# Patient Record
Sex: FEMALE | Race: WHITE
[De-identification: names, ages, dates, MRNs, and addresses within clinical notes are randomized per-mention and may not be internally consistent; named-entity substitution may affect disease eponyms.]

---

## 2018-12-29 ENCOUNTER — HOSPITAL ENCOUNTER (EMERGENCY)
Dept: HOSPITAL 10 - E/R | Age: 58
Discharge: HOME | End: 2018-12-29
Payer: COMMERCIAL

## 2018-12-29 ENCOUNTER — HOSPITAL ENCOUNTER (EMERGENCY)
Dept: HOSPITAL 91 - E/R | Age: 58
Discharge: HOME | End: 2018-12-29
Payer: COMMERCIAL

## 2018-12-29 VITALS — HEIGHT: 55 IN | BODY MASS INDEX: 32.35 KG/M2 | WEIGHT: 139.77 LBS

## 2018-12-29 VITALS — HEART RATE: 66 BPM | DIASTOLIC BLOOD PRESSURE: 90 MMHG | RESPIRATION RATE: 18 BRPM | SYSTOLIC BLOOD PRESSURE: 165 MMHG

## 2018-12-29 DIAGNOSIS — R19.03: Primary | ICD-10-CM

## 2018-12-29 DIAGNOSIS — I10: ICD-10-CM

## 2018-12-29 DIAGNOSIS — Z79.82: ICD-10-CM

## 2018-12-29 LAB
ADD MAN DIFF?: NO
ADD UMIC: NO
ALANINE AMINOTRANSFERASE: 28 IU/L (ref 13–69)
ALBUMIN/GLOBULIN RATIO: 1.23
ALBUMIN: 4.2 G/DL (ref 3.3–4.9)
ALKALINE PHOSPHATASE: 141 IU/L (ref 42–121)
ANION GAP: 12 (ref 5–13)
ASPARTATE AMINO TRANSFERASE: 25 IU/L (ref 15–46)
BASOPHIL #: 0.1 10^3/UL (ref 0–0.1)
BASOPHILS %: 1 % (ref 0–2)
BILIRUBIN,DIRECT: 0 MG/DL (ref 0–0.2)
BILIRUBIN,TOTAL: 0.2 MG/DL (ref 0.2–1.3)
BLOOD UREA NITROGEN: 16 MG/DL (ref 7–20)
CALCIUM: 9 MG/DL (ref 8.4–10.2)
CARBON DIOXIDE: 32 MMOL/L (ref 21–31)
CHLORIDE: 103 MMOL/L (ref 97–110)
CREATININE: 0.81 MG/DL (ref 0.44–1)
EOSINOPHILS #: 0.2 10^3/UL (ref 0–0.5)
EOSINOPHILS %: 1.9 % (ref 0–7)
GLOBULIN: 3.4 G/DL (ref 1.3–3.2)
GLUCOSE: 101 MG/DL (ref 70–220)
HEMATOCRIT: 44.9 % (ref 37–47)
HEMOGLOBIN: 13.8 G/DL (ref 12–16)
IMMATURE GRANS #M: 0.04 10^3/UL (ref 0–0.03)
IMMATURE GRANS % (M): 0.5 % (ref 0–0.43)
LYMPHOCYTES #: 1.2 10^3/UL (ref 0.8–2.9)
LYMPHOCYTES %: 14.6 % (ref 15–51)
MEAN CORPUSCULAR HEMOGLOBIN: 25.7 PG (ref 29–33)
MEAN CORPUSCULAR HGB CONC: 30.7 G/DL (ref 32–37)
MEAN CORPUSCULAR VOLUME: 83.5 FL (ref 82–101)
MEAN PLATELET VOLUME: 12.2 FL (ref 7.4–10.4)
MONOCYTE #: 0.8 10^3/UL (ref 0.3–0.9)
MONOCYTES %: 9.6 % (ref 0–11)
NEUTROPHIL #: 6.1 10^3/UL (ref 1.6–7.5)
NEUTROPHILS %: 72.4 % (ref 39–77)
NUCLEATED RED BLOOD CELLS #: 0 10^3/UL (ref 0–0)
NUCLEATED RED BLOOD CELLS%: 0 /100WBC (ref 0–0)
PLATELET COUNT: 232 10^3/UL (ref 140–415)
POTASSIUM: 3.9 MMOL/L (ref 3.5–5.1)
RED BLOOD COUNT: 5.38 10^6/UL (ref 4.2–5.4)
RED CELL DISTRIBUTION WIDTH: 14.1 % (ref 11.5–14.5)
SODIUM: 147 MMOL/L (ref 135–144)
TOTAL PROTEIN: 7.6 G/DL (ref 6.1–8.1)
UR ASCORBIC ACID: NEGATIVE MG/DL
UR BILIRUBIN (DIP): NEGATIVE MG/DL
UR BLOOD (DIP): NEGATIVE MG/DL
UR CLARITY: CLEAR
UR COLOR: (no result)
UR GLUCOSE (DIP): (no result) MG/DL
UR KETONES (DIP): NEGATIVE MG/DL
UR LEUKOCYTE ESTERASE (DIP): NEGATIVE LEU/UL
UR NITRITE (DIP): NEGATIVE MG/DL
UR PH (DIP): 6 (ref 5–9)
UR SPECIFIC GRAVITY (DIP): 1.01 (ref 1–1.03)
UR TOTAL PROTEIN (DIP): NEGATIVE MG/DL
UR UROBILINOGEN (DIP): NEGATIVE MG/DL
WHITE BLOOD COUNT: 8.4 10^3/UL (ref 4.8–10.8)

## 2018-12-29 PROCEDURE — 36415 COLL VENOUS BLD VENIPUNCTURE: CPT

## 2018-12-29 PROCEDURE — 74177 CT ABD & PELVIS W/CONTRAST: CPT

## 2018-12-29 PROCEDURE — 80053 COMPREHEN METABOLIC PANEL: CPT

## 2018-12-29 PROCEDURE — 96374 THER/PROPH/DIAG INJ IV PUSH: CPT

## 2018-12-29 PROCEDURE — 81003 URINALYSIS AUTO W/O SCOPE: CPT

## 2018-12-29 PROCEDURE — 96375 TX/PRO/DX INJ NEW DRUG ADDON: CPT

## 2018-12-29 PROCEDURE — 99285 EMERGENCY DEPT VISIT HI MDM: CPT

## 2018-12-29 PROCEDURE — 85025 COMPLETE CBC W/AUTO DIFF WBC: CPT

## 2018-12-29 RX ADMIN — THIAMINE HYDROCHLORIDE 1 MLS/HR: 100 INJECTION, SOLUTION INTRAMUSCULAR; INTRAVENOUS at 18:39

## 2018-12-29 RX ADMIN — HYDROMORPHONE HYDROCHLORIDE 1 MG: 1 INJECTION, SOLUTION INTRAMUSCULAR; INTRAVENOUS; SUBCUTANEOUS at 18:39

## 2018-12-29 RX ADMIN — IOHEXOL 1 ML: 300 INJECTION, SOLUTION INTRAVENOUS at 19:48

## 2018-12-29 RX ADMIN — VASOPRESSIN 1 ML/2.5 SEC: 20 INJECTION, SOLUTION INTRAMUSCULAR; SUBCUTANEOUS at 19:48

## 2018-12-29 RX ADMIN — ONDANSETRON HYDROCHLORIDE 1 MG: 2 INJECTION, SOLUTION INTRAMUSCULAR; INTRAVENOUS at 18:39

## 2018-12-29 NOTE — ERD
ER Documentation


Chief Complaint


Chief Complaint





LOWER RIGHT SIDED ABD X 4 DAYS HX OF OVARIAN





HPI


This is a 58-year-old female complains of 1 day of right lower quadrant pain 


described as constant without radiation.  The pain is sharp in nature.  No 


nausea vomiting diarrhea.  No fever.  No hematuria or dysuria.  Pain is somewhat


worse with walking.  The patient was seen in urgent care clinic just prior to 


arrival and was sent here for evaluation to rule out appendicitis





ROS


All systems reviewed and are negative except as per history of present illness.





Medications


Home Meds


Active Scripts


Hydrocodone/Acetaminophen (Norco 5-325 Tablet) 1 Each Tablet, 1 TAB PO Q6H PRN 


for PAIN, #20 TAB


   Prov:LEKKOS,APOSTOLOS A. DO         12/29/18


Dicyclomine HCl (Dicyclomine HCl) 10 Mg Capsule, 20 MG PO TID PRN for ABDOMINAL 


CRAMPING, #20 CAP


   Prov:LEKKOS,APOSTOLOS A. DO         12/29/18


Reported Medications


Empagliflozin (Jardiance) 10 Mg Tablet, 10 MG PO DAILY, TAB


   12/29/18


Omeprazole* (Omeprazole*) 40 Mg Capsule.dr, 40 MG PO DAILY, #30 CAP


   12/29/18


Carvedilol* (Carvedilol*) 3.125 Mg Tablet, 3.125 MG PO BID, #60 TAB


   12/29/18


Aspirin* (Aspirin* EC) 81 Mg Tablet.dr, 81 MG PO DAILY, TAB


   12/29/18


Benazepril Hcl* (Benazepril Hcl*) 40 Mg Tablet, 40 MG PO DAILY, #30 TAB


   12/29/18


Discontinued Reported Medications


Metoprolol Tartrate* (Lopressor*) 25 Mg Tab, 12.5 MG PO DAILY, #60 TAB


   8/27/16


Omeprazole* (Omeprazole*) 40 Mg Capsule.dr, 40 MG PO DAILY, #30 CAP


   8/27/16


Benazepril Hcl* (Benazepril Hcl*) 20 Mg Tablet, 20 MG PO DAILY, #30 TAB


   8/27/16





Allergies


Allergies:  


Coded Allergies:  


     No Known Allergy (Unverified , 12/29/18)





PMhx/Soc


History of Surgery:  No


Anesthesia Reaction:  No


Hx Neurological Disorder:  No


Hx Respiratory Disorders:  No


Hx Cardiac Disorders:  Yes (HTN )


Hx Psychiatric Problems:  No


Hx Miscellaneous Medical Probl:  No


Hx Alcohol Use:  No


Hx Substance Use:  No


Hx Tobacco Use:  No


Smoking Status:  Never smoker





FmHx


Family History:  No coronary disease





Physical Exam


Vitals





Vital Signs


  Date      Temp  Pulse  Resp  B/P (MAP)   Pulse Ox  O2          O2 Flow    FiO2


Time                                                 Delivery    Rate


  12/29/18  98.1     65    18      199/85        99


     17:18                          (123)





Physical Exam


 Const:      Well-developed, well-nourished


 Head:        Atraumatic, normocephalic


 Eyes:       Normal Conjunctiva, PERRLA, EOMI, normal sclera, no nystagmus


 ENT:         Normal External Ears, Nose and Mouth, moist mucus membranes.


 Neck:        Full range of motion.  No meningismus, no lymphadenopathy.


 Resp:         Clear to auscultation bilaterally, no wheezing, rhonchi, rales


 Cardio:       Regular rate and rhythm, no murmurs, S1 S2 present


 Abd:         Soft, mild to moderate right lower quadrant tenderness, non 


distended. Normal bowel sounds, no guarding or rebound, no pulsitile abdominal 


masses or bruits


 Skin:         No petechiae or rashes, no ecchymosis , no maculopapular rash


 Back:        No midline or flank tenderness


 Ext:          No cyanosis, or edema, FROM x 4, normal inspection, 


neurovascularly intact x 4


 Neur:        Awake and alert, STR 5/5 x 4, sensation intact x 4, no focal 


findings, cerebellum intact


 Psych:        Normal Mood and Affect


Result Diagram:  


12/29/18 1830 12/29/18 1830





Results 24 hrs





Laboratory Tests


              Test
                                 12/29/18
18:30


              White Blood Count                       8.4 10^3/ul


              Red Blood Count                        5.38 10^6/ul


              Hemoglobin                                13.8 g/dl


              Hematocrit                                   44.9 %


              Mean Corpuscular Volume                     83.5 fl


              Mean Corpuscular Hemoglobin                 25.7 pg


              Mean Corpuscular Hemoglobin
Concent      30.7 g/dl 



              Red Cell Distribution Width                  14.1 %


              Platelet Count                          232 10^3/UL


              Mean Platelet Volume                        12.2 fl


              Immature Granulocytes %                     0.500 %


              Neutrophils %                                72.4 %


              Lymphocytes %                                14.6 %


              Monocytes %                                   9.6 %


              Eosinophils %                                 1.9 %


              Basophils %                                   1.0 %


              Nucleated Red Blood Cells %             0.0 /100WBC


              Immature Granulocytes #               0.040 10^3/ul


              Neutrophils #                           6.1 10^3/ul


              Lymphocytes #                           1.2 10^3/ul


              Monocytes #                             0.8 10^3/ul


              Eosinophils #                           0.2 10^3/ul


              Basophils #                             0.1 10^3/ul


              Nucleated Red Blood Cells #             0.0 10^3/ul


              Urine Color                          STRAW


              Urine Clarity                        CLEAR


              Urine pH                                        6.0


              Urine Specific Gravity                        1.006


              Urine Ketones                        NEGATIVE mg/dL


              Urine Nitrite                        NEGATIVE mg/dL


              Urine Bilirubin                      NEGATIVE mg/dL


              Urine Urobilinogen                   NEGATIVE mg/dL


              Urine Leukocyte Esterase
            NEGATIVE
Cherie/ul


              Urine Hemoglobin                     NEGATIVE mg/dL


              Urine Glucose                              3+ mg/dL


              Urine Total Protein                  NEGATIVE mg/dl


              Sodium Level                             147 mmol/L


              Potassium Level                          3.9 mmol/L


              Chloride Level                           103 mmol/L


              Carbon Dioxide Level                      32 mmol/L


              Anion Gap                                        12


              Blood Urea Nitrogen                        16 mg/dl


              Creatinine                               0.81 mg/dl


              Est Glomerular Filtrat Rate
mL/min   > 60 mL/min 



              Glucose Level                             101 mg/dl


              Calcium Level                             9.0 mg/dl


              Total Bilirubin                           0.2 mg/dl


              Direct Bilirubin                         0.00 mg/dl


              Indirect Bilirubin                        0.2 mg/dl


              Aspartate Amino Transf
(AST/SGOT)          25 IU/L 



              Alanine Aminotransferase
(ALT/SGPT)        28 IU/L 



              Alkaline Phosphatase                       141 IU/L


              Total Protein                              7.6 g/dl


              Albumin                                    4.2 g/dl


              Globulin                                  3.40 g/dl


              Albumin/Globulin Ratio                         1.23





Current Medications


 Medications
   Dose
          Sig/Gracia
       Start Time
   Status  Last


 (Trade)       Ordered        Route
 PRN     Stop Time              Admin
Dose


                              Reason                                Admin


 Sodium         1,000 ml @ 
   Q1H STAT
      12/29/18      DC          12/29/18


Chloride       1,000 mls/hr   IV
            18:25
                       18:39



                                             12/29/18


                                             19:24


                1 mg           ONCE  STAT
    12/29/18      DC          12/29/18


Hydromorphone                 IV
            18:25
                       18:39



HCl
                                         12/29/18


(Dilaudid)                                   18:26


 Ondansetron    4 mg           ONCE  STAT
    12/29/18      DC          12/29/18


HCl
  (Zofran                 IV
            18:25
                       18:39



Inj)                                         12/29/18


                                             18:26


 Iohexol
       150 ml         STK-MED        12/29/18      DC          12/29/18


(Omnipaque                    ONCE
 .ROUTE
  19:28
                       19:48



300mg/
 ml)                                  12/29/18


                                             19:29


 Sodium         100 ml @ ud    STK-MED        12/29/18      DC          12/29/18


Chloride                      ONCE
 .ROUTE
  19:28
                       19:48



                                             12/29/18


                                             19:29








Procedures/MDM


                                        


PROCEDURE:   CT Abdomen and Pelvis with contrast. 


 


CLINICAL INDICATION:   Right lower quadrant pain


 


TECHNIQUE:   CT scan of the abdomen and pelvis with contrast was performed on a 


multi-detector high-resolution CT scanner.  The patient was scanned following th


e uncomplicated intravenous administration of 100 cc of Omnipaque 300 IV 


contrast.  Coronal and sagittal reformatted images were obtained from the axial 


source images. DICOM images are available. CTDI equals 12.25  mGy, and DLP 


equals 670.88 mGy-cm.


 


One or more of the following dose reduction techniques were used:


- Automated exposure control.


- Adjustment of the mA and/or kV according to patient size.


- Use of iterative reconstruction technique.


 


COMPARISON:   None. 


 


FINDINGS:


 


Images of the abdominal organs demonstrate the liver is unremarkable in 


appearance. No gallstones are seen within the gallbladder. There is no intra or 


extrahepatic ductal dilatation.


 


The pancreas and spleen and left adrenal gland are normal. There is a diffusely 


enhancing right adrenal mass which measures 2.3 x 2.2 cm.


 


Slightly delayed excretion of the right kidney is seen with moderate right-sided


hydronephrosis and hydroureter. There are least to right-sided renal stones, 


larger measuring 3 mm. The left kidney is normal in appearance. 


 


Images of the bowel loops demonstrate there are unremarkable without evidence of


wall thickening or fatty stranding.


 


The vessels are normal in appearance. Clips are seen in the region of the aortic


bifurcations. No lymphadenopathy is visualized.


 


Images the pelvis demonstrate mild diverticulosis of the sigmoid colon.  The 


appendix is visualized and is unremarkable. The bladder is unremarkable as 


compressed secondary to a cystic lesion with a mural nodule in the pelvis which 


measures 9.8 x 8.2 cm. The uterus is not visualized. No lymphadenopathy is seen.


 


The osseous structures are unremarkable. The lung bases are clear.


 


IMPRESSION:


 


CT of the abdomen pelvis demonstrates a cystic mass within the pelvis with a 


solid right-sided neural nodule. The patient is status post hysterectomy. Would 


recommend correlation for oophorectomy as this could represent an ovarian 


neoplasm. There is likely compression on the distal ureter resulting in moderate


right-sided hydronephrosis and hydroureter.


 


To right-sided renal stones measuring 3 mm.


 


There is a heterogeneously enhancing right adrenal nodule which measures 2.3 cm 


and a metastatic lesion is not excluded would recommend further evaluation.


 


Mild diverticulosis of the sigmoid colon.


 


RPTAT:HAGL


_____________________________________________ 


Physician Darlene           Date    Time 


Electronically viewed and signed by Anu Pittman Physician on 12/29/2018 20:01 


 


D:  12/29/2018 20:01  T:  12/29/2018 20:01


RL/





CC: ENA JORDAN DO





[phone]




















The patient has a history of ovarian cancer with complete hysterectomy.  Some 


type of cystic mass which may be some type of cancer.  There is also a possible 


mass on the adrenal gland.  The ureter is getting slightly pinch causing some 


mild hydro-on the right.  I discussed this with the patient and her family given


a copy of the CAT scan report and that they need to follow-up with her doctor 


Monday.  The needed MRI of the pelvis to evaluate with this is a could be a 


return of some cancer.





She is making urine still has no flank pain I do not think she needs to come in 


for the hospital for a stent yet.  I do given strong warning signs to return.








Patient feels much better at this time, and vital signs are normal, symptoms 


have improved. I did give strict instructions to return to the ED if symptoms 


continue or worsen, patient will otherwise follow-up with primary care 


physician. Patient understood instructions and agreed to plan.





Disclaimer: Inadvertent spelling and grammatical errors are likely due to 


EHR/dictation software use and do not reflect on the overall quality of patient 


care. Also, please note that the electronic time recorded on this note does not 


necessarily reflect the actual time of the patient encounter.





Departure


Diagnosis:  


   Primary Impression:  


   Pelvic mass in female


   Additional Impression:  


   Abdominal pain


   Abdominal location:  right lower quadrant  Qualified Codes:  R10.31 - Right 


   lower quadrant pain


Condition:  Stable











ENA JORDAN DO         Dec 29, 2018 18:46

## 2019-02-21 ENCOUNTER — HOSPITAL ENCOUNTER (EMERGENCY)
Dept: HOSPITAL 91 - E/R | Age: 59
Discharge: HOME | End: 2019-02-21
Payer: COMMERCIAL

## 2019-02-21 ENCOUNTER — HOSPITAL ENCOUNTER (EMERGENCY)
Dept: HOSPITAL 10 - E/R | Age: 59
Discharge: HOME | End: 2019-02-21
Payer: COMMERCIAL

## 2019-02-21 VITALS — HEART RATE: 63 BPM | SYSTOLIC BLOOD PRESSURE: 127 MMHG | RESPIRATION RATE: 16 BRPM | DIASTOLIC BLOOD PRESSURE: 63 MMHG

## 2019-02-21 VITALS
WEIGHT: 141.1 LBS | BODY MASS INDEX: 25.96 KG/M2 | BODY MASS INDEX: 25.96 KG/M2 | HEIGHT: 62 IN | WEIGHT: 141.1 LBS | HEIGHT: 62 IN

## 2019-02-21 DIAGNOSIS — Z85.43: ICD-10-CM

## 2019-02-21 DIAGNOSIS — I10: ICD-10-CM

## 2019-02-21 DIAGNOSIS — Z85.42: ICD-10-CM

## 2019-02-21 DIAGNOSIS — Z79.82: ICD-10-CM

## 2019-02-21 DIAGNOSIS — R33.9: ICD-10-CM

## 2019-02-21 DIAGNOSIS — E11.9: ICD-10-CM

## 2019-02-21 DIAGNOSIS — R19.00: Primary | ICD-10-CM

## 2019-02-21 LAB
ADD MAN DIFF?: NO
ADD UMIC: YES
ALANINE AMINOTRANSFERASE: 50 IU/L (ref 13–69)
ALBUMIN/GLOBULIN RATIO: 1.05
ALBUMIN: 3.9 G/DL (ref 3.3–4.9)
ALKALINE PHOSPHATASE: 135 IU/L (ref 42–121)
ANION GAP: 9 (ref 5–13)
ASPARTATE AMINO TRANSFERASE: 34 IU/L (ref 15–46)
BASOPHIL #: 0.1 10^3/UL (ref 0–0.1)
BASOPHILS %: 0.6 % (ref 0–2)
BILIRUBIN,DIRECT: 0 MG/DL (ref 0–0.2)
BILIRUBIN,TOTAL: 0.3 MG/DL (ref 0.2–1.3)
BLOOD UREA NITROGEN: 24 MG/DL (ref 7–20)
CALCIUM: 8.8 MG/DL (ref 8.4–10.2)
CARBON DIOXIDE: 26 MMOL/L (ref 21–31)
CHLORIDE: 107 MMOL/L (ref 97–110)
CREATININE: 1.19 MG/DL (ref 0.44–1)
EOSINOPHILS #: 0.1 10^3/UL (ref 0–0.5)
EOSINOPHILS %: 1.1 % (ref 0–7)
GLOBULIN: 3.7 G/DL (ref 1.3–3.2)
GLUCOSE: 94 MG/DL (ref 70–220)
HEMATOCRIT: 39.6 % (ref 37–47)
HEMOGLOBIN: 12.4 G/DL (ref 12–16)
IMMATURE GRANS #M: 0.05 10^3/UL (ref 0–0.03)
IMMATURE GRANS % (M): 0.6 % (ref 0–0.43)
LIPASE: 121 U/L (ref 23–300)
LYMPHOCYTES #: 1 10^3/UL (ref 0.8–2.9)
LYMPHOCYTES %: 11 % (ref 15–51)
MEAN CORPUSCULAR HEMOGLOBIN: 25.6 PG (ref 29–33)
MEAN CORPUSCULAR HGB CONC: 31.3 G/DL (ref 32–37)
MEAN CORPUSCULAR VOLUME: 81.6 FL (ref 82–101)
MEAN PLATELET VOLUME: 11.8 FL (ref 7.4–10.4)
MONOCYTE #: 0.9 10^3/UL (ref 0.3–0.9)
MONOCYTES %: 10.6 % (ref 0–11)
NEUTROPHIL #: 6.7 10^3/UL (ref 1.6–7.5)
NEUTROPHILS %: 76.1 % (ref 39–77)
NUCLEATED RED BLOOD CELLS #: 0 10^3/UL (ref 0–0)
NUCLEATED RED BLOOD CELLS%: 0 /100WBC (ref 0–0)
PLATELET COUNT: 231 10^3/UL (ref 140–415)
POTASSIUM: 3.5 MMOL/L (ref 3.5–5.1)
RED BLOOD COUNT: 4.85 10^6/UL (ref 4.2–5.4)
RED CELL DISTRIBUTION WIDTH: 15.1 % (ref 11.5–14.5)
SODIUM: 142 MMOL/L (ref 135–144)
TOTAL PROTEIN: 7.6 G/DL (ref 6.1–8.1)
UR ASCORBIC ACID: NEGATIVE MG/DL
UR BACTERIA: (no result) /HPF
UR BILIRUBIN (DIP): NEGATIVE MG/DL
UR BLOOD (DIP): (no result) MG/DL
UR CLARITY: CLEAR
UR COLOR: (no result)
UR GLUCOSE (DIP): (no result) MG/DL
UR KETONES (DIP): NEGATIVE MG/DL
UR LEUKOCYTE ESTERASE (DIP): NEGATIVE LEU/UL
UR NITRITE (DIP): NEGATIVE MG/DL
UR PH (DIP): 6 (ref 5–9)
UR RBC: 6 /HPF (ref 0–5)
UR SPECIFIC GRAVITY (DIP): 1.01 (ref 1–1.03)
UR SQUAMOUS EPITHELIAL CELL: (no result) /HPF
UR TOTAL PROTEIN (DIP): NEGATIVE MG/DL
UR UROBILINOGEN (DIP): NEGATIVE MG/DL
UR WBC: 2 /HPF (ref 0–5)
WHITE BLOOD COUNT: 8.8 10^3/UL (ref 4.8–10.8)

## 2019-02-21 PROCEDURE — 80053 COMPREHEN METABOLIC PANEL: CPT

## 2019-02-21 PROCEDURE — 96376 TX/PRO/DX INJ SAME DRUG ADON: CPT

## 2019-02-21 PROCEDURE — 81001 URINALYSIS AUTO W/SCOPE: CPT

## 2019-02-21 PROCEDURE — 85025 COMPLETE CBC W/AUTO DIFF WBC: CPT

## 2019-02-21 PROCEDURE — 99285 EMERGENCY DEPT VISIT HI MDM: CPT

## 2019-02-21 PROCEDURE — 96375 TX/PRO/DX INJ NEW DRUG ADDON: CPT

## 2019-02-21 PROCEDURE — 96374 THER/PROPH/DIAG INJ IV PUSH: CPT

## 2019-02-21 PROCEDURE — 74177 CT ABD & PELVIS W/CONTRAST: CPT

## 2019-02-21 PROCEDURE — 36415 COLL VENOUS BLD VENIPUNCTURE: CPT

## 2019-02-21 PROCEDURE — 83690 ASSAY OF LIPASE: CPT

## 2019-02-21 PROCEDURE — 51702 INSERT TEMP BLADDER CATH: CPT

## 2019-02-21 RX ADMIN — ONDANSETRON HYDROCHLORIDE 1 MG: 2 INJECTION, SOLUTION INTRAMUSCULAR; INTRAVENOUS at 12:41

## 2019-02-21 RX ADMIN — SODIUM CHLORIDE 1 ML: 9 INJECTION, SOLUTION INTRAMUSCULAR; INTRAVENOUS; SUBCUTANEOUS at 13:20

## 2019-02-21 RX ADMIN — IODIXANOL 1 ML: 320 INJECTION, SOLUTION INTRAVASCULAR at 13:21

## 2019-02-21 RX ADMIN — FAMOTIDINE 1 MG: 10 INJECTION, SOLUTION INTRAVENOUS at 12:41

## 2019-02-21 RX ADMIN — THIAMINE HYDROCHLORIDE 1 MLS/HR: 100 INJECTION, SOLUTION INTRAMUSCULAR; INTRAVENOUS at 12:41

## 2019-02-21 RX ADMIN — VASOPRESSIN 1 ML/10 SEC: 20 INJECTION, SOLUTION INTRAMUSCULAR; SUBCUTANEOUS at 13:21

## 2019-02-21 NOTE — ERD
ER Documentation


Chief Complaint


Chief Complaint





C/O ABD. PAIN FOR 2 DAYS, PAIN WITH URINATION.





HPI


This is a 58-year-old female with a history of ovarian cancer and cervical 


cancer with status post hysterectomy done in 2011 who presents to the emergency 


room for evaluation of pelvic pain.  The patient states that she has had pain 


for the past 2 days and according to the daughter this patient was diagnosed 


with "a pelvic mass" in late 2018 and was discharged.  She has not had any 


follow-up with surgeon and came to the ER today for worsening symptoms of 


abdominal pain and difficulty with urination.  The patient denies any fevers 


chills nausea or vomiting associated with this and came to the ER for evaluation





ROS


All systems reviewed and are negative except as per history of present illness.





Medications


Home Meds


Reported Medications


Empagliflozin (Jardiance) 10 Mg Tablet, 10 MG PO DAILY, TAB


   12/29/18


Omeprazole* (Omeprazole*) 40 Mg Capsule.dr, 40 MG PO DAILY, #30 CAP


   12/29/18


Carvedilol* (Carvedilol*) 3.125 Mg Tablet, 3.125 MG PO BID, #60 TAB


   12/29/18


Benazepril Hcl* (Benazepril Hcl*) 40 Mg Tablet, 40 MG PO DAILY, #30 TAB


   12/29/18


Discontinued Reported Medications


Aspirin* (Aspirin* EC) 81 Mg Tablet.dr, 81 MG PO DAILY, TAB


   12/29/18


Discontinued Scripts


Dicyclomine HCl (Dicyclomine HCl) 10 Mg Capsule, 10 MG PO TID PRN for ABDOMINAL 


CRAMPING, #20 CAP


   Prov:BRY MELENDREZ MD         12/29/18


Hydrocodone/Acetaminophen (Norco 5-325 Tablet) 1 Each Tablet, 1 TAB PO Q6H PRN 


for PAIN, #20 TAB


   Prov:ENA JORDAN A. DO         12/29/18


Dicyclomine HCl (Dicyclomine HCl) 10 Mg Capsule, 20 MG PO TID PRN for ABDOMINAL 


CRAMPING, #20 CAP


   Prov:RAZIA JORDANSTKIRTS A. DO         12/29/18





Allergies


Allergies:  


Coded Allergies:  


     No Known Allergy (Unverified , 12/29/18)





PMhx/Soc


History of Surgery:  No


Anesthesia Reaction:  No


Hx Neurological Disorder:  No


Hx Respiratory Disorders:  No


Hx Cardiac Disorders:  Yes (HTN )


Hx Psychiatric Problems:  No


Hx Miscellaneous Medical Probl:  Yes (DM)


Hx Alcohol Use:  No


Hx Substance Use:  No


Hx Tobacco Use:  No


Smoking Status:  Never smoker





Physical Exam


Vitals





Vital Signs


  Date      Temp  Pulse  Resp  B/P (MAP)   Pulse Ox  O2          O2 Flow    FiO2


Time                                                 Delivery    Rate


   2/21/19           68    20      159/62        98  Room Air


     13:43                           (94)


   2/21/19  97.8     83    18      185/95       100


     10:11                          (125)





Physical Exam


INITIAL VITAL SIGNS: Reviewed by me


GENERAL:  The patient is well developed and appropriate for usual state of 


health in no apparent distress


HEENT: Pupils equal, round, and reactive to light.  EOMI. There is no scleral 


icterus.


NECK:  C-spine is soft and supple, there is no meningismus.  There is no 


cervical lymphadenopathy.


LUNGS:  Clear to auscultation bilaterally. There are no rales, wheezes or 


rhonchi.


HEART:  Regular rate and rhythm, no murmurs, clicks, rubs or gallops.


ABDOMEN: Prepubic tenderness to palpation, tenderness to palpation over the 


right adnexal region, otherwise soft, non-tender, non-distended.  There are 


bowel sounds in all four quadrants. No rebound or guarding.


EXTREMITIES:  There is no peripheral cyanosis or edema.  No focal swelling or 


erythema.


NEUROLOGICAL:  The patient moves all four extremities with 5/5 strength.  


Cranial nerves II - XII are intact. Normal gait. Alert and oriented


SKIN:  There is no apparent rash or petechiae.


HEME/LYMPHATIC:  There is no evidence of excessive bruising or lymphedema.


PSYCHIATRIC:  The patient does not appear anxious or depressed.


Result Diagram:  


2/21/19 1230                                                                    


           2/21/19 1230





Results 24 hrs





Laboratory Tests


              Test
                                  2/21/19
12:30


              White Blood Count                       8.8 10^3/ul


              Red Blood Count                        4.85 10^6/ul


              Hemoglobin                                12.4 g/dl


              Hematocrit                                   39.6 %


              Mean Corpuscular Volume                     81.6 fl


              Mean Corpuscular Hemoglobin                 25.6 pg


              Mean Corpuscular Hemoglobin
Concent      31.3 g/dl 



              Red Cell Distribution Width                  15.1 %


              Platelet Count                          231 10^3/UL


              Mean Platelet Volume                        11.8 fl


              Immature Granulocytes %                     0.600 %


              Neutrophils %                                76.1 %


              Lymphocytes %                                11.0 %


              Monocytes %                                  10.6 %


              Eosinophils %                                 1.1 %


              Basophils %                                   0.6 %


              Nucleated Red Blood Cells %             0.0 /100WBC


              Immature Granulocytes #               0.050 10^3/ul


              Neutrophils #                           6.7 10^3/ul


              Lymphocytes #                           1.0 10^3/ul


              Monocytes #                             0.9 10^3/ul


              Eosinophils #                           0.1 10^3/ul


              Basophils #                             0.1 10^3/ul


              Nucleated Red Blood Cells #             0.0 10^3/ul


              Urine Color                          STRAW


              Urine Clarity                        CLEAR


              Urine pH                                        6.0


              Urine Specific Gravity                        1.008


              Urine Ketones                        NEGATIVE mg/dL


              Urine Nitrite                        NEGATIVE mg/dL


              Urine Bilirubin                      NEGATIVE mg/dL


              Urine Urobilinogen                   NEGATIVE mg/dL


              Urine Leukocyte Esterase
            NEGATIVE
Cherie/ul


              Urine Microscopic RBC                        6 /HPF


              Urine Microscopic WBC                        2 /HPF


              Urine Squamous Epithelial
Cells      FEW /HPF 



              Urine Bacteria                       FEW /HPF


              Urine Hemoglobin                           2+ mg/dL


              Urine Glucose                              3+ mg/dL


              Urine Total Protein                  NEGATIVE mg/dl


              Sodium Level                             142 mmol/L


              Potassium Level                          3.5 mmol/L


              Chloride Level                           107 mmol/L


              Carbon Dioxide Level                      26 mmol/L


              Anion Gap                                         9


              Blood Urea Nitrogen                        24 mg/dl


              Creatinine                               1.19 mg/dl


              Est Glomerular Filtrat Rate
mL/min       47 mL/min 



              Glucose Level                              94 mg/dl


              Calcium Level                             8.8 mg/dl


              Total Bilirubin                           0.3 mg/dl


              Direct Bilirubin                         0.00 mg/dl


              Indirect Bilirubin                        0.3 mg/dl


              Aspartate Amino Transf
(AST/SGOT)          34 IU/L 



              Alanine Aminotransferase
(ALT/SGPT)        50 IU/L 



              Alkaline Phosphatase                       135 IU/L


              Total Protein                              7.6 g/dl


              Albumin                                    3.9 g/dl


              Globulin                                  3.70 g/dl


              Albumin/Globulin Ratio                         1.05


              Lipase                                      121 U/L





Current Medications


 Medications
   Dose
          Sig/Gracia
       Start Time
   Status  Last


 (Trade)       Ordered        Route
 PRN     Stop Time              Admin
Dose


                              Reason                                Admin


 Sodium         1,000 ml @ 
   Q1H STAT
      2/21/19       DC           2/21/19


Chloride       1,000 mls/hr   IV
            11:58
                       12:41



                                             2/21/19 12:57


 Morphine       4 mg           ONCE  STAT
    2/21/19       DC           2/21/19


Sulfate
                      IV
            11:58
                       12:42



(morphine)                                   2/21/19 11:59


 Ondansetron    4 mg           ONCE  STAT
    2/21/19       DC           2/21/19


HCl
  (Zofran                 IV
            11:58
                       12:41



Inj)                                         2/21/19 11:59


 Famotidine
    20 mg          ONCE  STAT
    2/21/19       DC           2/21/19


(Pepcid Iv)                   IV
            11:58
                       12:41



                                             2/21/19 11:59


 IV Flush
      10 ml          STK-MED        2/21/19       DC           2/21/19


(NS 10 ml)                    ONCE
 .ROUTE
  13:14
                       13:20



                                             2/21/19 13:15


 Sodium         100 ml @ ud    STK-MED        2/21/19       DC           2/21/19


Chloride                      ONCE
 .ROUTE
  13:14
                       13:21



                                             2/21/19 13:15


 Iodixanol
     100 ml         STK-MED        2/21/19       DC           2/21/19


(Visipaque                    ONCE
 .ROUTE
  13:14
                       13:21



Locm)                                        2/21/19 13:15


 Morphine       4 mg           ONCE  STAT
    2/21/19       DC           2/21/19


Sulfate
                      IV
            13:29
                       13:43



(morphine)                                   2/21/19 13:31








Procedures/MDM


CT abdomen pelvis without: 1. STATUS POST HYSTERECTOMY AGAIN IDENTIFIED IS A 


LARGE CYSTIC MASS MEASURING 11.5 X 9.7 CM WITH RIGHT SIDED MURAL NODULE. SUSPECT


OVARIAN ORIGIN, WORRISOME FOR OVARIAN NEOPLASM. THIS HAS INCREASED IN SIZE SINCE


PRIOR EXAMINATION.


 


2.  THERE IS MASS EFFECT ON THE BILATERAL DISTAL URETERS, WITH ASSOCIATED 


BILATERAL HYDROURETERONEPHROSIS, WHICH HAS WORSENED SINCE PRIOR EXAMINATION. 


THERE IS ALSO MASS EFFECT ON THE POSTERIOR WALL THE BLADDER WHICH IS DISTENDED. 


RECOMMEND DECOMPRESSION.


 


3.  2.7 cm right adrenal gland nodule, cannot exclude metastatic disease.


 


4. No evidence of bowel obstruction. Stool filled loops of large bowel 


suggestive of constipation.


 





This is a 58-year-old female who presents to the emergency room for evaluation 


of abdominal pain and pelvic pain.  The patient does have a history of ovarian 


cancer and hysterectomy.  She was diagnosed with a pelvic mass in late 2018 and 


on my exam she appeared to be slightly uncomfortable.  Lab work was obtained and


a CT of the abdomen pelvis was obtained which does show a large cystic mass with


possible ovarian origin concerning for possible ovarian neoplasm.  This mass is 


increased in size over the past few months and is now compressing the bladder 


and causing hydronephrosis which is also worsened.  The patient had a Tidwell 


catheter placed and given her worsening mass with mass-effect on the bladder 


this patient will benefit from inpatient hospitalization for OB/GYN consult.  I 


have paged gynecologist on call Dr. Mayes and have not heard back from her.  I


have spoken to Dr. Azul with [facility] and he states that he can arrange for 


outpatient gynecological/onc follow-up.  The family is agreeable to this.  She 


will be discharged at this time with a leg bag Tidwell and prescription for Tyleno


l with codeine.  Patient was advised to return to the emergency room at any time


for reevaluation she verbalized understanding.





Departure


Diagnosis:  


   Primary Impression:  


   Pelvic mass


   Additional Impression:  


   Urinary retention


Condition:  Stable











KATI TORRES DO              Feb 21, 2019 14:13

## 2019-02-26 ENCOUNTER — HOSPITAL ENCOUNTER (INPATIENT)
Dept: HOSPITAL 91 - E/R | Age: 59
LOS: 13 days | Discharge: HOME | DRG: 749 | End: 2019-03-11
Payer: COMMERCIAL

## 2019-02-26 ENCOUNTER — HOSPITAL ENCOUNTER (INPATIENT)
Dept: HOSPITAL 10 - E/R | Age: 59
LOS: 13 days | Discharge: HOME | DRG: 749 | End: 2019-03-11
Attending: INTERNAL MEDICINE | Admitting: INTERNAL MEDICINE
Payer: COMMERCIAL

## 2019-02-26 VITALS — DIASTOLIC BLOOD PRESSURE: 71 MMHG | HEART RATE: 67 BPM | SYSTOLIC BLOOD PRESSURE: 180 MMHG | RESPIRATION RATE: 18 BRPM

## 2019-02-26 VITALS — HEART RATE: 67 BPM | SYSTOLIC BLOOD PRESSURE: 175 MMHG | RESPIRATION RATE: 20 BRPM | DIASTOLIC BLOOD PRESSURE: 71 MMHG

## 2019-02-26 VITALS
WEIGHT: 137.17 LBS | BODY MASS INDEX: 27.65 KG/M2 | WEIGHT: 137.17 LBS | HEIGHT: 59 IN | BODY MASS INDEX: 27.65 KG/M2 | HEIGHT: 59 IN

## 2019-02-26 VITALS — DIASTOLIC BLOOD PRESSURE: 69 MMHG | SYSTOLIC BLOOD PRESSURE: 129 MMHG | RESPIRATION RATE: 17 BRPM

## 2019-02-26 DIAGNOSIS — I10: ICD-10-CM

## 2019-02-26 DIAGNOSIS — K66.8: ICD-10-CM

## 2019-02-26 DIAGNOSIS — E11.9: ICD-10-CM

## 2019-02-26 DIAGNOSIS — C56.9: Primary | ICD-10-CM

## 2019-02-26 DIAGNOSIS — C78.6: ICD-10-CM

## 2019-02-26 DIAGNOSIS — K66.0: ICD-10-CM

## 2019-02-26 DIAGNOSIS — N17.9: ICD-10-CM

## 2019-02-26 DIAGNOSIS — R33.8: ICD-10-CM

## 2019-02-26 DIAGNOSIS — Z85.43: ICD-10-CM

## 2019-02-26 DIAGNOSIS — Z85.42: ICD-10-CM

## 2019-02-26 DIAGNOSIS — K56.7: ICD-10-CM

## 2019-02-26 DIAGNOSIS — N13.1: ICD-10-CM

## 2019-02-26 LAB
ADD MAN DIFF?: NO
ADD UMIC: YES
ALANINE AMINOTRANSFERASE: 48 IU/L (ref 13–69)
ALBUMIN/GLOBULIN RATIO: 1
ALBUMIN: 3.9 G/DL (ref 3.3–4.9)
ALKALINE PHOSPHATASE: 121 IU/L (ref 42–121)
ANION GAP: 9 (ref 5–13)
ASPARTATE AMINO TRANSFERASE: 30 IU/L (ref 15–46)
BASOPHIL #: 0 10^3/UL (ref 0–0.1)
BASOPHILS %: 0.3 % (ref 0–2)
BILIRUBIN,DIRECT: 0 MG/DL (ref 0–0.2)
BILIRUBIN,TOTAL: 0.3 MG/DL (ref 0.2–1.3)
BLOOD UREA NITROGEN: 22 MG/DL (ref 7–20)
CALCIUM: 8.8 MG/DL (ref 8.4–10.2)
CANCER ANTIGEN 125: 28.7 U/ML (ref 0–35)
CARBON DIOXIDE: 27 MMOL/L (ref 21–31)
CHLORIDE: 108 MMOL/L (ref 97–110)
CREATININE,URINE RANDOM: 35.09 MG/DL (ref 20–320)
CREATININE: 2.06 MG/DL (ref 0.44–1)
EOSINOPHILS #: 0.2 10^3/UL (ref 0–0.5)
EOSINOPHILS %: 3.4 % (ref 0–7)
GLOBULIN: 3.9 G/DL (ref 1.3–3.2)
GLUCOSE: 108 MG/DL (ref 70–220)
HEMATOCRIT: 39.4 % (ref 37–47)
HEMOGLOBIN: 12 G/DL (ref 12–16)
IMMATURE GRANS #M: 0.03 10^3/UL (ref 0–0.03)
IMMATURE GRANS % (M): 0.4 % (ref 0–0.43)
LIPASE: 61 U/L (ref 23–300)
LYMPHOCYTES #: 0.6 10^3/UL (ref 0.8–2.9)
LYMPHOCYTES %: 8.5 % (ref 15–51)
MEAN CORPUSCULAR HEMOGLOBIN: 25.4 PG (ref 29–33)
MEAN CORPUSCULAR HGB CONC: 30.5 G/DL (ref 32–37)
MEAN CORPUSCULAR VOLUME: 83.5 FL (ref 82–101)
MEAN PLATELET VOLUME: 11.9 FL (ref 7.4–10.4)
MONOCYTE #: 0.9 10^3/UL (ref 0.3–0.9)
MONOCYTES %: 12.1 % (ref 0–11)
NEUTROPHIL #: 5.3 10^3/UL (ref 1.6–7.5)
NEUTROPHILS %: 75.3 % (ref 39–77)
NUCLEATED RED BLOOD CELLS #: 0 10^3/UL (ref 0–0)
NUCLEATED RED BLOOD CELLS%: 0 /100WBC (ref 0–0)
PLATELET COUNT: 218 10^3/UL (ref 140–415)
POTASSIUM: 4.3 MMOL/L (ref 3.5–5.1)
PROTEIN URINE: 40 MG/DL (ref 0–11.9)
PROTEIN/CREAT RATIO: 1.13 RATIO
RED BLOOD COUNT: 4.72 10^6/UL (ref 4.2–5.4)
RED CELL DISTRIBUTION WIDTH: 15.2 % (ref 11.5–14.5)
SODIUM,URINE RANDOM: 94 MMOL/L (ref 30–90)
SODIUM: 144 MMOL/L (ref 135–144)
TOTAL PROTEIN: 7.8 G/DL (ref 6.1–8.1)
UR ASCORBIC ACID: NEGATIVE MG/DL
UR BACTERIA: (no result) /HPF
UR BILIRUBIN (DIP): NEGATIVE MG/DL
UR BLOOD (DIP): (no result) MG/DL
UR CLARITY: CLEAR
UR COLOR: (no result)
UR GLUCOSE (DIP): (no result) MG/DL
UR KETONES (DIP): NEGATIVE MG/DL
UR LEUKOCYTE ESTERASE (DIP): (no result) LEU/UL
UR NITRITE (DIP): NEGATIVE MG/DL
UR PH (DIP): 6 (ref 5–9)
UR RBC: 83 /HPF (ref 0–5)
UR SPECIFIC GRAVITY (DIP): 1 (ref 1–1.03)
UR TOTAL PROTEIN (DIP): NEGATIVE MG/DL
UR UROBILINOGEN (DIP): NEGATIVE MG/DL
UR WBC: 14 /HPF (ref 0–5)
URINE EOSINOPHILS: 9 % (ref 0–1.9)
WHITE BLOOD COUNT: 7 10^3/UL (ref 4.8–10.8)

## 2019-02-26 PROCEDURE — 88304 TISSUE EXAM BY PATHOLOGIST: CPT

## 2019-02-26 PROCEDURE — 85025 COMPLETE CBC W/AUTO DIFF WBC: CPT

## 2019-02-26 PROCEDURE — 96374 THER/PROPH/DIAG INJ IV PUSH: CPT

## 2019-02-26 PROCEDURE — 83036 HEMOGLOBIN GLYCOSYLATED A1C: CPT

## 2019-02-26 PROCEDURE — 81001 URINALYSIS AUTO W/SCOPE: CPT

## 2019-02-26 PROCEDURE — 81003 URINALYSIS AUTO W/O SCOPE: CPT

## 2019-02-26 PROCEDURE — 88341 IMHCHEM/IMCYTCHM EA ADD ANTB: CPT

## 2019-02-26 PROCEDURE — 36415 COLL VENOUS BLD VENIPUNCTURE: CPT

## 2019-02-26 PROCEDURE — 89190 NASAL SMEAR FOR EOSINOPHILS: CPT

## 2019-02-26 PROCEDURE — 86850 RBC ANTIBODY SCREEN: CPT

## 2019-02-26 PROCEDURE — 82570 ASSAY OF URINE CREATININE: CPT

## 2019-02-26 PROCEDURE — 97162 PT EVAL MOD COMPLEX 30 MIN: CPT

## 2019-02-26 PROCEDURE — 83690 ASSAY OF LIPASE: CPT

## 2019-02-26 PROCEDURE — 88307 TISSUE EXAM BY PATHOLOGIST: CPT

## 2019-02-26 PROCEDURE — 97110 THERAPEUTIC EXERCISES: CPT

## 2019-02-26 PROCEDURE — 88342 IMHCHEM/IMCYTCHM 1ST ANTB: CPT

## 2019-02-26 PROCEDURE — 82962 GLUCOSE BLOOD TEST: CPT

## 2019-02-26 PROCEDURE — 74430 CONTRAST X-RAY BLADDER: CPT

## 2019-02-26 PROCEDURE — 96375 TX/PRO/DX INJ NEW DRUG ADDON: CPT

## 2019-02-26 PROCEDURE — 80053 COMPREHEN METABOLIC PANEL: CPT

## 2019-02-26 PROCEDURE — 84300 ASSAY OF URINE SODIUM: CPT

## 2019-02-26 PROCEDURE — 88331 PATH CONSLTJ SURG 1 BLK 1SPC: CPT

## 2019-02-26 PROCEDURE — 85610 PROTHROMBIN TIME: CPT

## 2019-02-26 PROCEDURE — 76775 US EXAM ABDO BACK WALL LIM: CPT

## 2019-02-26 PROCEDURE — 83735 ASSAY OF MAGNESIUM: CPT

## 2019-02-26 PROCEDURE — 82550 ASSAY OF CK (CPK): CPT

## 2019-02-26 PROCEDURE — 86901 BLOOD TYPING SEROLOGIC RH(D): CPT

## 2019-02-26 PROCEDURE — 86304 IMMUNOASSAY TUMOR CA 125: CPT

## 2019-02-26 PROCEDURE — 85730 THROMBOPLASTIN TIME PARTIAL: CPT

## 2019-02-26 PROCEDURE — 80048 BASIC METABOLIC PNL TOTAL CA: CPT

## 2019-02-26 PROCEDURE — 86900 BLOOD TYPING SEROLOGIC ABO: CPT

## 2019-02-26 PROCEDURE — 97116 GAIT TRAINING THERAPY: CPT

## 2019-02-26 PROCEDURE — C2617 STENT, NON-COR, TEM W/O DEL: HCPCS

## 2019-02-26 PROCEDURE — 84560 ASSAY OF URINE/URIC ACID: CPT

## 2019-02-26 PROCEDURE — 99285 EMERGENCY DEPT VISIT HI MDM: CPT

## 2019-02-26 PROCEDURE — 87086 URINE CULTURE/COLONY COUNT: CPT

## 2019-02-26 RX ADMIN — INSULIN ASPART 1 UNIT: 100 INJECTION, SOLUTION INTRAVENOUS; SUBCUTANEOUS at 17:23

## 2019-02-26 RX ADMIN — MORPHINE SULFATE PRN MG: 2 INJECTION, SOLUTION INTRAMUSCULAR; INTRAVENOUS at 15:56

## 2019-02-26 RX ADMIN — ONDANSETRON HYDROCHLORIDE 1 MG: 2 INJECTION, SOLUTION INTRAMUSCULAR; INTRAVENOUS at 02:46

## 2019-02-26 RX ADMIN — FOLIC ACID SCH MLS/HR: 5 INJECTION, SOLUTION INTRAMUSCULAR; INTRAVENOUS; SUBCUTANEOUS at 12:14

## 2019-02-26 RX ADMIN — LEVOFLOXACIN 1 MLS/HR: 500 INJECTION, SOLUTION INTRAVENOUS at 04:36

## 2019-02-26 RX ADMIN — LIDOCAINE HYDROCHLORIDE 1 MLS/HR: 10 INJECTION, SOLUTION EPIDURAL; INFILTRATION; INTRACAUDAL; PERINEURAL at 02:46

## 2019-02-26 RX ADMIN — FAMOTIDINE SCH MG: 20 TABLET ORAL at 12:13

## 2019-02-26 RX ADMIN — MORPHINE SULFATE 1 MG: 2 INJECTION, SOLUTION INTRAMUSCULAR; INTRAVENOUS at 15:56

## 2019-02-26 RX ADMIN — INSULIN ASPART 1 UNIT: 100 INJECTION, SOLUTION INTRAVENOUS; SUBCUTANEOUS at 12:00

## 2019-02-26 RX ADMIN — FAMOTIDINE 1 MG: 20 TABLET ORAL at 12:13

## 2019-02-26 RX ADMIN — INSULIN ASPART 1 UNIT: 100 INJECTION, SOLUTION INTRAVENOUS; SUBCUTANEOUS at 20:34

## 2019-02-26 RX ADMIN — THIAMINE HYDROCHLORIDE 1 MLS/HR: 100 INJECTION, SOLUTION INTRAMUSCULAR; INTRAVENOUS at 12:14

## 2019-02-26 NOTE — HP
DATE OF ADMISSION: 02/26/2019

 

CHIEF COMPLAINT:  Abdominal pain.

 

HISTORY OF PRESENT ILLNESS:  A 58-year-old female with a history of uterine cancer status post total 
abdominal hysterectomy in 10/2011, returns to emergency room with complaints of lower abdominal pain 
radiating to her back.  This has been associated with mild nausea, but no vomiting.  The patient was 
seen in the emergency room on 02/21/2019.  At that time, CAT scan of the abdomen and pelvis showed th
at patient is status post hysterectomy.  A large cystic mass measuring 11.5 x 9.7 cm was identified. 
 There was right-sided mural nodule.  Ovarian origin was suspected.  There was also evidence of bilat
eral distal mass effect in the ureters.  There was associated hydronephrosis.  Creatinine at that reuben
e was 1.19.  Repeat creatinine now is 2.

 

REVIEW OF SYSTEMS:  The patient denies any weight loss.

 

PAST MEDICAL HISTORY:

1.  History of uterine cancer.

2.  Hypertension.

3.  Type 2 diabetes mellitus.

 

MEDICATIONS PRIOR TO ADMISSION:

1.  Keflex.

2.  Benazepril.

3.  Coreg.

4.  Tramadol.

5.  Omeprazole.

6.  Jardiance.

 

SOCIAL HISTORY:  The patient lives at home.  Her daughter was at the bedside and acted as a translato
r.  The patient denies tobacco or alcohol use.

 

PHYSICAL EXAMINATION:

GENERAL:  Well-developed, well-nourished female who is in no apparent distress.

VITAL SIGNS:  Stable.  She is afebrile.

HEENT:  Extraocular muscles are intact.  Pupils are equal and reactive to light bilaterally.  Sclerae
 are anicteric.  Oropharynx is clear and moist.

NECK:  Supple.  No JVD, no carotid bruits.

LUNGS:  Clear to auscultation bilaterally.

CARDIAC:  Regular rate and rhythm.  No murmurs, rubs or gallops.

ABDOMEN:  Soft, lower abdominal tenderness to palpation.  No rebound or guarding.  Normoactive bowel 
sounds.

EXTREMITIES:  No clubbing, cyanosis or edema.

NEUROLOGICAL:  Grossly nonfocal.

 

LABORATORY DATA:  Hemoglobin of 12, white blood cell count of 7, platelet count of 218,000.  Sodium 1
44, potassium 4.3, chloride 108, bicarbonate 27, BUN 22, creatinine 2.06.

 

DIAGNOSTIC DATA:  Repeat ultrasound shows normal sized kidneys with normal renal parenchymal echogeni
city.  Mild bilateral hydronephrosis was noted without any renal calculi.  Multiple small left renal 
cysts and largest measuring 8.9 cm were noted.

 

ASSESSMENT:

1.  A 58-year-old female presenting with persistent lower abdominal pain.  The patient has been found
 to have a pelvic mass.  This is most likely recurrence of her underlying cancer.

2.  History of uterine cancer.

3.  Status post total abdominal hysterectomy on 10/27/2011.

4.  Acute kidney injury, most likely due to the mass pressing on the ureters.

5.  Mild bilateral hydronephrosis.

6.  Hypertension.

7.  Type 2 diabetes mellitus.

 

PLAN:

1.  Admit to med/surg.  IV fluid hydration.  Check CA-125 marker.

2.  Resume selective home medications.

3.  Case was discussed with Dr. Brown and nephrology consultation will be requested.

 

 

Dictated By: JOY PARISI/CHONG

DD:    02/26/2019 11:07:59

DT:    02/26/2019 12:34:26

Conf#: [id number]

DID#:  [id number]

## 2019-02-26 NOTE — QN
Documentation


Comment


Observation Note:


   Time:      4 hours


   Family Hx:   Negative for diabetes


   Evaluation:   Multiple exams showed improving symptoms and no evidence of 


clinical decompensation.  Patient has regal insurance and will be admitted to 


Dr. Azul to a medical surgical inpatient bed for acute renal failure.











MIHAI MARRERO MD                Feb 26, 2019 10:49

## 2019-02-26 NOTE — ERD
ER Documentation


Chief Complaint


Chief Complaint





ABD PAIN TODAY, +NAUSEA, HAS LEG BAG. HX OF ABD MASS.





HPI


This is a very pleasant 58-year female, with abdominal pain and nausea today.  


Patient was diagnosed with urinary tract infection had a Tidwell catheter placed 


secondary to pelvic mass.  Today she says she has had more pain.  No fevers no 


chills.  No other current complaints.





ROS


All systems reviewed and are negative except as per history of present illness.





Medications


Home Meds


Reported Medications


Tramadol Hcl* (Ultram*) 50 Mg Tablet, 50 MG PO Q6H PRN for PAIN, #15 TAB


   19


Cephalexin* (Cephalexin*) 500 Mg Capsule, 500 MG PO Q6 for 7 Days, #28 CAP


   19


Empagliflozin (Jardiance) 10 Mg Tablet, 10 MG PO DAILY, TAB


   18


Omeprazole* (Omeprazole*) 40 Mg Capsule.dr, 40 MG PO DAILY, #30 CAP


   18


Carvedilol* (Carvedilol*) 3.125 Mg Tablet, 3.125 MG PO BID, #60 TAB


   18


Benazepril Hcl* (Benazepril Hcl*) 40 Mg Tablet, 40 MG PO DAILY, #30 TAB


   18


Discontinued Reported Medications


Aspirin* (Aspirin* EC) 81 Mg Tablet.dr, 81 MG PO DAILY, TAB


   18


Discontinued Scripts


Acetaminophen with Codeine (Acetaminophen-Cod #3 Tablet) 1 Each Tablet, 1 TAB PO


Q6H PRN for PAIN, #10 TAB


   Prov:KATI TORRES DO         19


Dicyclomine HCl (Dicyclomine HCl) 10 Mg Capsule, 10 MG PO TID PRN for ABDOMINAL 


CRAMPING, #20 CAP


   Prov:BRY MELENDREZ MD         18


Hydrocodone/Acetaminophen (Norco 5-325 Tablet) 1 Each Tablet, 1 TAB PO Q6H PRN 


for PAIN, #20 TAB


   Prov:ENA JORDAN DO         18


Dicyclomine HCl (Dicyclomine HCl) 10 Mg Capsule, 20 MG PO TID PRN for ABDOMINAL 


CRAMPING, #20 CAP


   Prov:ENA JORDAN DO         18





Allergies


Allergies:  


Coded Allergies:  


     No Known Allergy (Unverified , 19)





PMhx/Soc


History of Surgery:  Yes (hysterectomy)


Anesthesia Reaction:  No


Hx Neurological Disorder:  No


Hx Respiratory Disorders:  No


Hx Cardiac Disorders:  Yes (HTN )


Hx Psychiatric Problems:  No


Hx Miscellaneous Medical Probl:  Yes (DM, ovarian cancer)


Hx Alcohol Use:  No


Hx Substance Use:  No


Hx Tobacco Use:  No


Smoking Status:  Never smoker





Physical Exam


Vitals





Vital Signs


  Date      Temp  Pulse  Resp  B/P (MAP)   Pulse Ox  O2          O2 Flow    FiO2


Time                                                 Delivery    Rate


   19           61    20      146/78        96  Room Air


     04:55                          (100)


   19  97.2     69    16      191/86        99


     01:53                          (121)





Physical Exam


Const:   No acute distress


Head:   Atraumatic 


Eyes:    Normal Conjunctiva


ENT:    Normal External Ears, Nose and Mouth.


Neck:               Full range of motion. No meningismus.


Resp:   Clear to auscultation bilaterally


Cardio:   Regular rate and rhythm, no murmurs


Abd:    Soft, non tender, non distended. Normal bowel sounds


Skin:   No petechiae or rashes


Back:   No midline or flank tenderness


Ext:    No cyanosis, or edema


Neur:   Awake and alert


Psych:    Normal Mood and Affect


Result Diagram:  


19 0212                                                                    


           19 0212





Results 24 hrs





Laboratory Tests


              Test
                                  19
02:12


              White Blood Count                       7.0 10^3/ul


              Red Blood Count                        4.72 10^6/ul


              Hemoglobin                                12.0 g/dl


              Hematocrit                                   39.4 %


              Mean Corpuscular Volume                     83.5 fl


              Mean Corpuscular Hemoglobin                 25.4 pg


              Mean Corpuscular Hemoglobin
Concent      30.5 g/dl 



              Red Cell Distribution Width                  15.2 %


              Platelet Count                          218 10^3/UL


              Mean Platelet Volume                        11.9 fl


              Immature Granulocytes %                     0.400 %


              Neutrophils %                                75.3 %


              Lymphocytes %                                 8.5 %


              Monocytes %                                  12.1 %


              Eosinophils %                                 3.4 %


              Basophils %                                   0.3 %


              Nucleated Red Blood Cells %             0.0 /100WBC


              Immature Granulocytes #               0.030 10^3/ul


              Neutrophils #                           5.3 10^3/ul


              Lymphocytes #                           0.6 10^3/ul


              Monocytes #                             0.9 10^3/ul


              Eosinophils #                           0.2 10^3/ul


              Basophils #                             0.0 10^3/ul


              Nucleated Red Blood Cells #             0.0 10^3/ul


              Urine Color                          STRAW


              Urine Clarity                        CLEAR


              Urine pH                                        6.0


              Urine Specific Gravity                        1.005


              Urine Ketones                        NEGATIVE mg/dL


              Urine Nitrite                        NEGATIVE mg/dL


              Urine Bilirubin                      NEGATIVE mg/dL


              Urine Urobilinogen                   NEGATIVE mg/dL


              Urine Leukocyte Esterase                  2+ Cherie/ul


              Urine Microscopic RBC                       83 /HPF


              Urine Microscopic WBC                       14 /HPF


              Urine Bacteria                       FEW /HPF


              Urine Hemoglobin                           3+ mg/dL


              Urine Glucose                              3+ mg/dL


              Urine Total Protein                  NEGATIVE mg/dl


              Sodium Level                             144 mmol/L


              Potassium Level                          4.3 mmol/L


              Chloride Level                           108 mmol/L


              Carbon Dioxide Level                      27 mmol/L


              Anion Gap                                         9


              Blood Urea Nitrogen                        22 mg/dl


              Creatinine                               2.06 mg/dl


              Est Glomerular Filtrat Rate
mL/min       25 mL/min 



              Glucose Level                             108 mg/dl


              Calcium Level                             8.8 mg/dl


              Total Bilirubin                           0.3 mg/dl


              Direct Bilirubin                         0.00 mg/dl


              Indirect Bilirubin                        0.3 mg/dl


              Aspartate Amino Transf
(AST/SGOT)          30 IU/L 



              Alanine Aminotransferase
(ALT/SGPT)        48 IU/L 



              Alkaline Phosphatase                       121 IU/L


              Total Protein                              7.8 g/dl


              Albumin                                    3.9 g/dl


              Globulin                                  3.90 g/dl


              Albumin/Globulin Ratio                         1.00


              Lipase                                       61 U/L





Current Medications


 Medications
   Dose
          Sig/Gracia
       Start Time
   Status  Last


 (Trade)       Ordered        Route
 PRN     Stop Time              Admin
Dose


                              Reason                                Admin


 Sodium         500 ml @ 
     Q1H STAT
      19       DC           19


Chloride       500 mls/hr     IV
            02:07
                       02:46



                                             19 03:06


 Morphine       4 mg           ONCE  STAT
    19       DC           19


Sulfate
                      IV
            02:07
                       02:46



(morphine)                                   19 02:09


 Ondansetron    4 mg           ONCE  STAT
    19       DC           19


HCl
  (Zofran                 IV
            02:07
                       02:46



Inj)                                         19 02:09


                100 ml @ 
     ONCE  ONCE
    19       DC           19


Levofloxacin/  100 mls/hr     IVPB
          03:00
                       04:36




 Dextrose                                   19 03:59








Procedures/MDM


Medical decision m[age]-year-old female comes in with abdominal pain nausea.


 She has history of pelvic mass, patient has indwelling Tidwell catheter however 


her creatinine is raised from 0.8-2.1.  I feel she needs to be admitted at this 


point.  She will be transferred to [facility] she 


has insurance capitated to [facility].





Departure


Diagnosis:  


   Primary Impression:  


   Abdominal pain


   Abdominal location:  unspecified location  Qualified Codes:  R10.9 - 


   Unspecified abdominal pain


Condition:  SUNITHA [place]             2019 05:47

## 2019-02-26 NOTE — CONS
Assessment/Plan


Assessment/Plan


Assessment/Plan (Daily)


1. Acute kidney injury due to possible prerenal azotemia + possibely obstruction


from mass


2.  H/o Uterine CA s/p hysterectomy, now concerned about recurrence of tumour 


3. pelvis mass, pssible recurrence 


4. Hydronephrosis on previous CT scan possibly due to obstruction 


5. H/O HTN


6. H/o DM II





Plan:


seen in ED, will follow up on Floor


renal US to assess for Kidney size, to assess for hydronephrosis


IVF NS at 100 cc/hr


Urine Na, urine protein/cr ration, urine Eosinophils, CK total, Uric acid


Thanks for consultation, I will continue to follow up





Consultation Date/Type/Reason


Admit Date/Time


Feb 26, 2019 at 10:49


Date of Consultation:  Feb 26, 2019


Type of Consult


NEPHROLOGY


Reason for Consultation


acute renal failure


Requesting Provider:  JOY RAMIREZ MD


Date/Time of Note


DATE: 2/26/19 


TIME: 11:52





Hx of Present Illness


 58-year-old female with a history of uterine cancer status post total abdominal


hysterectomy in 10/2011, returns to emergency room with complaints of lower 


abdominal pain radiating to her back associated with nausea, no vomiting.    The


patient was seen in the emergency room on 02/21/2019.  At that time, CAT scan of


the abdomen and pelvis showed that patient is status post hysterectomy.  A large


cystic mass measuring 11.5 x 9.7 cm was identified.  There was right-sided mural


nodule.  Ovarian origin was suspected. Cr on last visit was 1.19- today Cr is 


2.0 Renal US showed no hydronephrosis, Renal has been consulted for possibel ZOEY


vs ZOEY on CKD


Constitutional:  no complaints


Eyes:  no complaints


ENT:  no complaints


Respiratory:  no complaints


Cardiovascular:  no complaints


Gastrointestinal:  pain, nausea


Genitourinary:  no complaints


Musculoskeletal:  no complaints


Skin:  no complaints


Neurologic:  no complaints


Endocrine:  no complaints


Lymphatic:  no complaints


Psychological:  no complaints


Immunologic:  no complaints





Past Medical History


Medical History:  other (H/o Uterine Cancer s/p Hysterectomy )


Home Meds


Reported Medications


Tramadol Hcl* (Ultram*) 50 Mg Tablet, 50 MG PO Q6H PRN for PAIN, #15 TAB


   2/26/19


Cephalexin* (Cephalexin*) 500 Mg Capsule, 500 MG PO Q6 for 7 Days, #28 CAP


   2/26/19


Empagliflozin (Jardiance) 10 Mg Tablet, 10 MG PO DAILY, TAB


   12/29/18


Omeprazole* (Omeprazole*) 40 Mg Capsule.dr, 40 MG PO DAILY, #30 CAP


   12/29/18


Carvedilol* (Carvedilol*) 3.125 Mg Tablet, 3.125 MG PO BID, #60 TAB


   12/29/18


Benazepril Hcl* (Benazepril Hcl*) 40 Mg Tablet, 40 MG PO DAILY, #30 TAB


   12/29/18


Discontinued Reported Medications


Aspirin* (Aspirin* EC) 81 Mg Tablet.dr, 81 MG PO DAILY, TAB


   12/29/18


Discontinued Scripts


Acetaminophen with Codeine (Acetaminophen-Cod #3 Tablet) 1 Each Tablet, 1 TAB PO


Q6H PRN for PAIN, #10 TAB


   Prov:KATI TORRES DO         2/21/19


Dicyclomine HCl (Dicyclomine HCl) 10 Mg Capsule, 10 MG PO TID PRN for ABDOMINAL 


CRAMPING, #20 CAP


   Prov:BRY MELENDREZ MD         12/29/18


Hydrocodone/Acetaminophen (Norco 5-325 Tablet) 1 Each Tablet, 1 TAB PO Q6H PRN 


for PAIN, #20 TAB


   Prov:ENA JORDAN DO         12/29/18


Dicyclomine HCl (Dicyclomine HCl) 10 Mg Capsule, 20 MG PO TID PRN for ABDOMINAL 


CRAMPING, #20 CAP


   Prov:ENA JORDAN DO         12/29/18


Medications





Current Medications


Ondansetron HCl (Zofran Inj) 4 mg BRIDGE ORDER PRN IV NAUSEA/VOMITING;  Start 


2/26/19 at 11:00;  Stop 2/27/19 at 10:59


Acetaminophen (Tylenol Tab) 650 mg ER BRIDGE PRN PO .MILD PAIN 1-3 OR TEMP;  


Start 2/26/19 at 11:00;  Stop 2/27/19 at 10:59


Sodium Chloride 1,000 ml @  100 mls/hr Q10H IV ;  Start 2/26/19 at 10:55


IV Flush (NS 3 ml) 3 ml PER PROTOCOL IV ;  Start 2/26/19 at 11:00


IV Flush (NS 3 ml) 3 ml PER PROTOCOL IV ;  Start 2/26/19 at 11:00


Acetaminophen (Tylenol Tab) 650 mg Q6H  PRN PO .PAIN 1-3 OR TEMP;  Start 2/26/19


at 11:00


Morphine Sulfate (morphine) 2 mg Q4H  PRN IV .SEVERE PAIN 7-10;  Start 2/26/19 


at 11:00


Docusate Sodium (Colace) 100 mg Q12H  PRN PO .CONSTIPATION;  Start 2/26/19 at 


11:00


Zolpidem Tartrate (Ambien) 5 mg QHS  PRN PO .INSOMNIA;  Start 2/26/19 at 11:00


Famotidine (Pepcid) 20 mg DAILY PO ;  Start 2/26/19 at 11:30


Carvedilol (Coreg) 3.125 mg BID PO ;  Start 2/26/19 at 11:30


Diagnostic Test (Pha) (Accu-Chek) 1 ea 02 XX ;  Start 2/27/19 at 02:00


Insulin Aspart (Novolog Insulin Pen) NOVOLOG *MILD* ALGORITHM WITH MEALS  


BEDTIME SC ;  Start 2/26/19 at 12:00


Allergies:  


Coded Allergies:  


     No Known Allergy (Unverified , 2/26/19)





Past Surgical History


Past Surgical Hx:  other (H/o Hysterectomy )





Family History


Significant Family History:  no pertinent family hx





Social History


Alcohol Use:  none


Smoking Status:  Never smoker


Drug Use:  none





Exam/Review of Systems


Exam


Vitals





Vital Signs


  Date      Temp  Pulse  Resp  B/P (MAP)   Pulse Ox  O2          O2 Flow    FiO2


Time                                                 Delivery    Rate


   2/26/19  98.1     78    19      159/97        99  Room Air


     10:35                          (117)





Constitutional:  alert


Psych:  no complaints


Head:  normocephalic


Eyes:  nl conjunctiva, EOMI


ENMT:  nl external ears & nose


Neck:  supple, non-tender


Respiratory:  clear to auscultation, normal air movement


Cardiovascular:  regular rate and rhythm, nl pulses


Gastrointestinal:  soft, non-tender


Musculoskeletal:  nl extremities to inspection


Extremities:  normal pulses


Neurological:  CNS II-XII intact, nl mental status, nl speech, nl strength


Skin:  nl turgor


Lymph:  nl lymph nodes





Results


Result Diagram:  


2/26/19 0212 2/26/19 0212





Results 24hrs





Laboratory Tests


               Test
                                2/26/19
02:12


               White Blood Count                           7.0  #


               Red Blood Count                             4.72


               Hemoglobin                                  12.0


               Hematocrit                                  39.4


               Mean Corpuscular Volume                     83.5


               Mean Corpuscular Hemoglobin                25.4  L


               Mean Corpuscular Hemoglobin
Concent       30.5  L



               Red Cell Distribution Width                15.2  H


               Platelet Count                               218


               Mean Platelet Volume                       11.9  H


               Immature Granulocytes %                    0.400


               Neutrophils %                               75.3


               Lymphocytes %                               8.5  L


               Monocytes %                                12.1  H


               Eosinophils %                                3.4


               Basophils %                                  0.3


               Nucleated Red Blood Cells %                  0.0


               Immature Granulocytes #                    0.030


               Neutrophils #                                5.3


               Lymphocytes #                               0.6  L


               Monocytes #                                  0.9


               Eosinophils #                                0.2


               Basophils #                                  0.0


               Nucleated Red Blood Cells #                  0.0


               Urine Color                          STRAW


               Urine Clarity                        CLEAR


               Urine pH                                     6.0


               Urine Specific Gravity                     1.005


               Urine Ketones                        NEGATIVE


               Urine Nitrite                        NEGATIVE


               Urine Bilirubin                      NEGATIVE


               Urine Urobilinogen                   NEGATIVE


               Urine Leukocyte Esterase                     2+  H


               Urine Microscopic RBC                        83  H


               Urine Microscopic WBC                        14  H


               Urine Bacteria                       FEW  A


               Urine Hemoglobin                             3+  H


               Urine Glucose                                3+  H


               Urine Total Protein                  NEGATIVE


               Sodium Level                                 144


               Potassium Level                              4.3


               Chloride Level                               108


               Carbon Dioxide Level                          27


               Anion Gap                                      9


               Blood Urea Nitrogen                          22  H


               Creatinine                                 2.06  H


               Est Glomerular Filtrat Rate
mL/min          25  L



               Glucose Level                                108


               Calcium Level                                8.8


               Total Bilirubin                              0.3


               Direct Bilirubin                            0.00


               Indirect Bilirubin                           0.3


               Aspartate Amino Transf
(AST/SGOT)            30  



               Alanine Aminotransferase
(ALT/SGPT)          48  



               Alkaline Phosphatase                         121


               Total Protein                                7.8


               Albumin                                      3.9


               Globulin                                   3.90  H


               Albumin/Globulin Ratio                      1.00


               Lipase                                        61








Medications


Medication





Current Medications


Ondansetron HCl (Zofran Inj) 4 mg BRIDGE ORDER PRN IV NAUSEA/VOMITING;  Start 


2/26/19 at 11:00;  Stop 2/27/19 at 10:59


Acetaminophen (Tylenol Tab) 650 mg ER BRIDGE PRN PO .MILD PAIN 1-3 OR TEMP;  


Start 2/26/19 at 11:00;  Stop 2/27/19 at 10:59


Sodium Chloride 1,000 ml @  100 mls/hr Q10H IV ;  Start 2/26/19 at 10:55


IV Flush (NS 3 ml) 3 ml PER PROTOCOL IV ;  Start 2/26/19 at 11:00


IV Flush (NS 3 ml) 3 ml PER PROTOCOL IV ;  Start 2/26/19 at 11:00


Acetaminophen (Tylenol Tab) 650 mg Q6H  PRN PO .PAIN 1-3 OR TEMP;  Start 2/26/19


at 11:00


Morphine Sulfate (morphine) 2 mg Q4H  PRN IV .SEVERE PAIN 7-10;  Start 2/26/19 


at 11:00


Docusate Sodium (Colace) 100 mg Q12H  PRN PO .CONSTIPATION;  Start 2/26/19 at 


11:00


Zolpidem Tartrate (Ambien) 5 mg QHS  PRN PO .INSOMNIA;  Start 2/26/19 at 11:00


Famotidine (Pepcid) 20 mg DAILY PO ;  Start 2/26/19 at 11:30


Carvedilol (Coreg) 3.125 mg BID PO ;  Start 2/26/19 at 11:30


Diagnostic Test (Pha) (Accu-Chek) 1 ea 02 XX ;  Start 2/27/19 at 02:00


Insulin Aspart (Novolog Insulin Pen) NOVOLOG *MILD* ALGORITHM WITH MEALS  


BEDTIME SC ;  Start 2/26/19 at 12:00











MARGIE GODINEZ MD           Feb 26, 2019 11:52

## 2019-02-27 VITALS — SYSTOLIC BLOOD PRESSURE: 168 MMHG | HEART RATE: 66 BPM | RESPIRATION RATE: 18 BRPM | DIASTOLIC BLOOD PRESSURE: 74 MMHG

## 2019-02-27 VITALS — HEART RATE: 66 BPM | RESPIRATION RATE: 20 BRPM | SYSTOLIC BLOOD PRESSURE: 127 MMHG | DIASTOLIC BLOOD PRESSURE: 61 MMHG

## 2019-02-27 VITALS — SYSTOLIC BLOOD PRESSURE: 184 MMHG | RESPIRATION RATE: 17 BRPM | HEART RATE: 82 BPM | DIASTOLIC BLOOD PRESSURE: 84 MMHG

## 2019-02-27 VITALS — DIASTOLIC BLOOD PRESSURE: 73 MMHG | HEART RATE: 60 BPM | SYSTOLIC BLOOD PRESSURE: 161 MMHG | RESPIRATION RATE: 17 BRPM

## 2019-02-27 LAB
ANION GAP: 7 (ref 5–13)
BLOOD UREA NITROGEN: 18 MG/DL (ref 7–20)
CALCIUM: 8.5 MG/DL (ref 8.4–10.2)
CARBON DIOXIDE: 25 MMOL/L (ref 21–31)
CHLORIDE: 113 MMOL/L (ref 97–110)
CREATINE KINASE: 23 IU/L (ref 23–200)
CREATININE: 1.82 MG/DL (ref 0.44–1)
GLUCOSE: 92 MG/DL (ref 70–220)
HEMOGLOBIN A1C: 5.8 % (ref 0–5.9)
POTASSIUM: 4.6 MMOL/L (ref 3.5–5.1)
SODIUM: 145 MMOL/L (ref 135–144)
URIC ACID: 6.3 MG/DL (ref 3.1–7.9)

## 2019-02-27 RX ADMIN — INSULIN ASPART 1 UNIT: 100 INJECTION, SOLUTION INTRAVENOUS; SUBCUTANEOUS at 08:00

## 2019-02-27 RX ADMIN — THIAMINE HYDROCHLORIDE 1 MLS/HR: 100 INJECTION, SOLUTION INTRAMUSCULAR; INTRAVENOUS at 13:42

## 2019-02-27 RX ADMIN — MORPHINE SULFATE 1 MG: 2 INJECTION, SOLUTION INTRAMUSCULAR; INTRAVENOUS at 23:07

## 2019-02-27 RX ADMIN — POLYETHYLENE GLYCOL 3350 1 GM: 17 POWDER, FOR SOLUTION ORAL at 09:08

## 2019-02-27 RX ADMIN — POLYETHYLENE GLYCOL 3350 1 GM: 17 POWDER, FOR SOLUTION ORAL at 20:48

## 2019-02-27 RX ADMIN — FAMOTIDINE 1 MG: 20 TABLET ORAL at 08:48

## 2019-02-27 RX ADMIN — MORPHINE SULFATE PRN MG: 2 INJECTION, SOLUTION INTRAMUSCULAR; INTRAVENOUS at 23:07

## 2019-02-27 RX ADMIN — MORPHINE SULFATE 1 MG: 2 INJECTION, SOLUTION INTRAMUSCULAR; INTRAVENOUS at 11:56

## 2019-02-27 RX ADMIN — DOCUSATE SODIUM 1 MG: 100 CAPSULE, LIQUID FILLED ORAL at 18:16

## 2019-02-27 RX ADMIN — POLYETHYLENE GLYCOL 3350 SCH GM: 17 POWDER, FOR SOLUTION ORAL at 09:08

## 2019-02-27 RX ADMIN — FOLIC ACID SCH MLS/HR: 5 INJECTION, SOLUTION INTRAMUSCULAR; INTRAVENOUS; SUBCUTANEOUS at 00:11

## 2019-02-27 RX ADMIN — POLYETHYLENE GLYCOL 3350 SCH GM: 17 POWDER, FOR SOLUTION ORAL at 20:48

## 2019-02-27 RX ADMIN — DOCUSATE SODIUM PRN MG: 100 CAPSULE, LIQUID FILLED ORAL at 18:16

## 2019-02-27 RX ADMIN — INSULIN ASPART 1 UNIT: 100 INJECTION, SOLUTION INTRAVENOUS; SUBCUTANEOUS at 20:48

## 2019-02-27 RX ADMIN — INSULIN ASPART 1 UNIT: 100 INJECTION, SOLUTION INTRAVENOUS; SUBCUTANEOUS at 12:00

## 2019-02-27 RX ADMIN — FOLIC ACID SCH MLS/HR: 5 INJECTION, SOLUTION INTRAMUSCULAR; INTRAVENOUS; SUBCUTANEOUS at 13:42

## 2019-02-27 RX ADMIN — THIAMINE HYDROCHLORIDE 1 MLS/HR: 100 INJECTION, SOLUTION INTRAMUSCULAR; INTRAVENOUS at 00:11

## 2019-02-27 RX ADMIN — FOLIC ACID SCH MLS/HR: 5 INJECTION, SOLUTION INTRAMUSCULAR; INTRAVENOUS; SUBCUTANEOUS at 06:55

## 2019-02-27 RX ADMIN — MORPHINE SULFATE PRN MG: 2 INJECTION, SOLUTION INTRAMUSCULAR; INTRAVENOUS at 11:56

## 2019-02-27 RX ADMIN — INSULIN ASPART 1 UNIT: 100 INJECTION, SOLUTION INTRAVENOUS; SUBCUTANEOUS at 17:32

## 2019-02-27 RX ADMIN — FAMOTIDINE SCH MG: 20 TABLET ORAL at 08:48

## 2019-02-27 RX ADMIN — THIAMINE HYDROCHLORIDE 1 MLS/HR: 100 INJECTION, SOLUTION INTRAMUSCULAR; INTRAVENOUS at 06:55

## 2019-02-27 NOTE — CONS
DATE OF ADMISSION: 02/26/2019

DATE OF CONSULTATION:  02/27/2019

 

 

 

HISTORY OF PRESENT ILLNESS:  This is a 58-year-old  female para 2 who was admitted here at [facility] two days ago.  She apparently has history of synchronous endometrial and o
varian cancer back in 2011.  She underwent surgical treatment at [facility].  There apparent
 early stages, therefore no adjuvant chemotherapy or radiation was required.  The patient has been se
en at [facility] last in 2015 and she now has been seeing Dr. Bernabe Peng gynecologically and sh
e was referred to me for followup in the office, but she was never seen.  She was admitted here on mu
ltiple occasions, the first time was recently 12/29/2018 and subsequently 02/21/2019, and now readmit
kiesha.  She apparently was readmitted with inability to urinate and her creatinine had risen to 2.0.  C
T scan on 02/21/2019 showed a large complex cystic mass pushing on the bladder.  The mass is about 12
 x 10 cm with some mural nodules.  At that time, there is a moderate hydronephrosis.  Retroperitoneal
ly, there is no adenopathy, no ascites and no carcinomatosis.  The CA-125 was drawn and it was normal
 at 28.7.  Since admission, her Tidwell catheter was placed in and her creatinine is now down to 1.82.

 

PAST MEDICAL HISTORY:  Significant for hypertension and diabetes.

 

PAST SURGICAL HISTORY:  Significant for TAHBSO and surgical staging in 2011.

 

ALLERGIES:  THE PATIENT HAS NO KNOWN DRUG ALLERGIES.

 

MEDICATIONS:  Current medications consist of:

1.  Benazepril.

2.  Coreg.

3.  Tramadol.

4.  Jardiance.

5.  Omeprazole.

 

SOCIAL HISTORY:  The patient lives at home.  She has a son and a daughter.

 

PHYSICAL EXAMINATION:

VITAL SIGNS:  She is afebrile, pulse in the 60s and blood pressure within normal limits.

HEENT:  Within normal limit.

GENERAL APPEARANCE:  No apparent distress.

LUNGS:  Clear to auscultation.

CARDIAC:  Regular.

ABDOMEN:  Soft, the mass palpable in the umbilicus, nontender.

EXTREMITIES:  No edema in extremities.

 

LABORATORY STUDIES:  White count is 7.0, hemoglobin 12.0, platelet at 218.  Again, chemistry wise, he
r creatinine today is 1.82 and her glucose is normal at 92.

 

IMPRESSION:

1.  Large complex cystic mass in the pelvis.

2.  History of synchronous endometrial ovarian carcinoma.

3.  Urinary retention from the pelvic mass.

4.  Rising creatinine.

 

RECOMMENDATION:

1.  This patient clearly needs surgical removal of the pelvic mass which compressing her  system.

2.  The patient does not appear to be toxic.

3.  I will look for OR time, sooner rather than later.

4.  I will let the patient and Dr. Ramirez know the timing of the surgery whether during this hospital
ization or outpatient.

 

I explained my plan with the patient and the family.

 

 

Dictated By: LILY YING/NTS

DD:    02/27/2019 18:59:46

DT:    02/27/2019 22:53:53

Conf#: [id number]

DID#:  [id number]

CC: JOY RAMIREZ MD;*EndCC*

## 2019-02-27 NOTE — CONS
Assessment/Plan


Assessment/Plan


Assessment/Plan (Daily)


1. Acute kidney injury due to possible prerenal azotemia + possibely obstruction


from mass


2.  H/o Uterine CA s/p hysterectomy, now concerned about recurrence of tumour 


3. pelvis mass, pssible recurrence 


4. Hydronephrosis on previous CT scan possibly due to obstruction 


5. H/O HTN


6. H/o DM II





Plan:


BUN/Cr improved to 18/1.82, Bp stable, US renal showed mild bilateral hydr


onephrosis, GYN oncology has been consulted on the case 


IVF NS at 75 cc/hr


Uric acid 5.8


will follow up





Consultation Date/Type/Reason


Admit Date/Time


Feb 26, 2019 at 10:49


Initial Consult Date


2/26/19


Type of Consult


NEPHROLOGY


Requesting Provider:  JOY RAMIREZ MD


Date/Time of Note


DATE: 2/27/19 


TIME: 09:04





24 HR Interval Summary


Free Text/Dictation


BUN/Cr improved to 18/1.82, Bp stable





Exam/Review of Systems


Exam


Vitals





Vital Signs


  Date      Temp  Pulse  Resp  B/P (MAP)   Pulse Ox  O2          O2 Flow    FiO2


Time                                                 Delivery    Rate


   2/27/19  98.5     82    17      184/84        98  Room Air


     08:00                          (117)








Intake and Output





2/26/19 2/26/19 2/27/19





1515:00


23:00


07:00





IntakeIntake Total


380 ml


380 ml


1600 ml





OutputOutput Total


500 ml





BalanceBalance


-120 ml


380 ml


1600 ml











Exam


Constitutional:  alert


Respiratory:  clear to auscultation, normal air movement


Cardiovascular:  regular rate and rhythm, nl pulses


Gastrointestinal:  soft, non-tender


Musculoskeletal:  nl extremities to inspection


Extremities:  normal pulses


Neurological:  CNS II-XII intact, nl mental status, nl speech, nl strength





Results


Result Diagram:  


2/26/19 0212                                                                    


           2/27/19 0607





Results 24hrs





Laboratory Tests


Test
                 2/26/19
11:18  2/26/19
12:00  2/26/19
12:47  2/26/19
13:17


 Antigen               28.7


Urine Eosinophils %                         9.0  H


Urine Random                               35.09


Creatinine


Urine Random Sodium                          94  H


Urine                                       1.13


Protein/Creatinine


Ratio


Urine Total Protein                        40.0  H


Bedside Glucose                                             63  L          65  L


Test
                 2/26/19
14:01  2/26/19
14:37  2/26/19
17:12  2/26/19
20:32


Bedside Glucose               112            121             91             91


Test
                 2/27/19
06:07  2/27/19
08:47  
              



Sodium Level                 145  H


Potassium Level               4.6


Chloride Level               113  H


Carbon Dioxide Level           25


Anion Gap                       7


Blood Urea Nitrogen            18


Creatinine                  1.82  H


Est Glomerular               29  L
  
              
              



Filtrat Rate
mL/min


Glucose Level                  92


Hemoglobin A1c                5.8


Uric Acid                     6.3


Calcium Level                 8.5


Creatine Kinase                23


Bedside Glucose                               83








Medications


Medication





Current Medications


Sodium Chloride 1,000 ml @  100 mls/hr Q10H IV  Last administered on 2/27/19at 


00:11; Admin Dose 100 MLS/HR;  Start 2/26/19 at 10:55


IV Flush (NS 3 ml) 3 ml PER PROTOCOL IV ;  Start 2/26/19 at 11:00


IV Flush (NS 3 ml) 3 ml PER PROTOCOL IV ;  Start 2/26/19 at 11:00


Acetaminophen (Tylenol Tab) 650 mg Q6H  PRN PO .PAIN 1-3 OR TEMP;  Start 2/26/19


at 11:00


Morphine Sulfate (morphine) 2 mg Q4H  PRN IV .SEVERE PAIN 7-10 Last administered


on 2/26/19at 15:56; Admin Dose 2 MG;  Start 2/26/19 at 11:00


Docusate Sodium (Colace) 100 mg Q12H  PRN PO .CONSTIPATION;  Start 2/26/19 at 11


:00


Zolpidem Tartrate (Ambien) 5 mg QHS  PRN PO .INSOMNIA;  Start 2/26/19 at 11:00


Famotidine (Pepcid) 20 mg DAILY PO  Last administered on 2/27/19at 08:48; Admin 


Dose 20 MG;  Start 2/26/19 at 11:30


Carvedilol (Coreg) 3.125 mg BID PO  Last administered on 2/27/19at 08:48; Admin 


Dose 3.125 MG;  Start 2/26/19 at 11:30


Diagnostic Test (Pha) (Accu-Chek) 1 ea 02 XX ;  Start 2/27/19 at 02:00


Insulin Aspart (Novolog Insulin Pen) NOVOLOG *MILD* ALGORITHM WITH MEALS  


BEDTIME SC ;  Start 2/26/19 at 12:00


Miscellaneous Information 1 ea NOTE XX ;  Start 2/26/19 at 12:30


Glucose (Glutose) 15 gm Q15M  PRN PO DECREASED GLUCOSE;  Start 2/26/19 at 12:30


Glucose (Glutose) 22.5 gm Q15M  PRN PO DECREASED GLUCOSE;  Start 2/26/19 at 


12:30


Dextrose (D50w Syringe) 25 ml Q15M  PRN IV DECREASED GLUCOSE;  Start 2/26/19 at 


12:30


Dextrose (D50w Syringe) 50 ml Q15M  PRN IV DECREASED GLUCOSE;  Start 2/26/19 at 


12:30


Glucagon (Glucagen) 1 mg Q15M  PRN IM DECREASED GLUCOSE;  Start 2/26/19 at 12:30


Glucose (Glutose) 15 gm Q15M  PRN BUCCAL DECREASED GLUCOSE;  Start 2/26/19 at 


12:30


Polyethylene Glycol (Miralax) 17 gm BID PO ;  Start 2/27/19 at 09:00











MARGIE GODINEZ MD           Feb 27, 2019 09:04

## 2019-02-28 VITALS — SYSTOLIC BLOOD PRESSURE: 172 MMHG | RESPIRATION RATE: 20 BRPM | DIASTOLIC BLOOD PRESSURE: 85 MMHG

## 2019-02-28 VITALS — DIASTOLIC BLOOD PRESSURE: 60 MMHG | RESPIRATION RATE: 19 BRPM | SYSTOLIC BLOOD PRESSURE: 140 MMHG | HEART RATE: 62 BPM

## 2019-02-28 VITALS — HEART RATE: 65 BPM | SYSTOLIC BLOOD PRESSURE: 159 MMHG | DIASTOLIC BLOOD PRESSURE: 70 MMHG

## 2019-02-28 VITALS — SYSTOLIC BLOOD PRESSURE: 171 MMHG | RESPIRATION RATE: 20 BRPM | HEART RATE: 63 BPM | DIASTOLIC BLOOD PRESSURE: 79 MMHG

## 2019-02-28 LAB
ANION GAP: 9 (ref 5–13)
BLOOD UREA NITROGEN: 16 MG/DL (ref 7–20)
CALCIUM: 8.4 MG/DL (ref 8.4–10.2)
CARBON DIOXIDE: 24 MMOL/L (ref 21–31)
CHLORIDE: 112 MMOL/L (ref 97–110)
CREATININE: 1.94 MG/DL (ref 0.44–1)
GLUCOSE: 94 MG/DL (ref 70–220)
POTASSIUM: 4.3 MMOL/L (ref 3.5–5.1)
SODIUM: 145 MMOL/L (ref 135–144)

## 2019-02-28 RX ADMIN — POLYETHYLENE GLYCOL 3350 SCH GM: 17 POWDER, FOR SOLUTION ORAL at 20:40

## 2019-02-28 RX ADMIN — POLYETHYLENE GLYCOL 3350 SCH GM: 17 POWDER, FOR SOLUTION ORAL at 08:43

## 2019-02-28 RX ADMIN — INSULIN ASPART 1 UNIT: 100 INJECTION, SOLUTION INTRAVENOUS; SUBCUTANEOUS at 12:00

## 2019-02-28 RX ADMIN — MORPHINE SULFATE PRN MG: 2 INJECTION, SOLUTION INTRAMUSCULAR; INTRAVENOUS at 10:11

## 2019-02-28 RX ADMIN — INSULIN ASPART 1 UNIT: 100 INJECTION, SOLUTION INTRAVENOUS; SUBCUTANEOUS at 08:00

## 2019-02-28 RX ADMIN — FAMOTIDINE SCH MG: 20 TABLET ORAL at 08:43

## 2019-02-28 RX ADMIN — MORPHINE SULFATE 1 MG: 2 INJECTION, SOLUTION INTRAMUSCULAR; INTRAVENOUS at 21:13

## 2019-02-28 RX ADMIN — POLYETHYLENE GLYCOL 3350 1 GM: 17 POWDER, FOR SOLUTION ORAL at 08:43

## 2019-02-28 RX ADMIN — POLYETHYLENE GLYCOL 3350 1 GM: 17 POWDER, FOR SOLUTION ORAL at 20:40

## 2019-02-28 RX ADMIN — MORPHINE SULFATE PRN MG: 2 INJECTION, SOLUTION INTRAMUSCULAR; INTRAVENOUS at 14:29

## 2019-02-28 RX ADMIN — INSULIN ASPART 1 UNIT: 100 INJECTION, SOLUTION INTRAVENOUS; SUBCUTANEOUS at 20:41

## 2019-02-28 RX ADMIN — THIAMINE HYDROCHLORIDE 1 MLS/HR: 100 INJECTION, SOLUTION INTRAMUSCULAR; INTRAVENOUS at 03:27

## 2019-02-28 RX ADMIN — FOLIC ACID SCH MLS/HR: 5 INJECTION, SOLUTION INTRAMUSCULAR; INTRAVENOUS; SUBCUTANEOUS at 16:19

## 2019-02-28 RX ADMIN — MORPHINE SULFATE 1 MG: 2 INJECTION, SOLUTION INTRAMUSCULAR; INTRAVENOUS at 10:11

## 2019-02-28 RX ADMIN — INSULIN ASPART 1 UNIT: 100 INJECTION, SOLUTION INTRAVENOUS; SUBCUTANEOUS at 17:27

## 2019-02-28 RX ADMIN — THIAMINE HYDROCHLORIDE 1 MLS/HR: 100 INJECTION, SOLUTION INTRAMUSCULAR; INTRAVENOUS at 16:19

## 2019-02-28 RX ADMIN — FOLIC ACID SCH MLS/HR: 5 INJECTION, SOLUTION INTRAMUSCULAR; INTRAVENOUS; SUBCUTANEOUS at 03:27

## 2019-02-28 RX ADMIN — MORPHINE SULFATE PRN MG: 2 INJECTION, SOLUTION INTRAMUSCULAR; INTRAVENOUS at 21:13

## 2019-02-28 RX ADMIN — FAMOTIDINE 1 MG: 20 TABLET ORAL at 08:43

## 2019-02-28 RX ADMIN — MORPHINE SULFATE 1 MG: 2 INJECTION, SOLUTION INTRAMUSCULAR; INTRAVENOUS at 14:29

## 2019-02-28 NOTE — PN
Date/Time of Note


Date/Time of Note


DATE: 2/28/19 


TIME: 09:45





Subjective


Doing well.  Pain is well controlled.  No new complaints.





Objective


Vitals





Vital Signs


  Date      Temp  Pulse  Resp  B/P (MAP)   Pulse Ox  O2          O2 Flow    FiO2


Time                                                 Delivery    Rate


   2/28/19  98.6           20      172/85        96  Room Air


     08:00                          (114)


   2/28/19           62


     02:31








Intake and Output





2/27/19 2/27/19 2/28/19





1414:59


22:59


06:59





IntakeIntake Total


780 ml


880 ml


650 ml





OutputOutput Total


800 ml





BalanceBalance


-20 ml


880 ml


650 ml











Lungs are clear to auscultation bilaterally


Cardiac regular rate and rhythm


Abdomen soft, lower abdominal tenderness to palpation.  No rebound or guarding


Extremities no clubbing cyanosis or edema


Neurological nonfocal





Results


Result Diagram:  


2/26/19 0212                                                                    


           2/28/19 0803








Medications


Medications





Current Medications


Sodium Chloride 1,000 ml @  75 mls/hr [id number] IV  Last administered on 2/28/19at


03:27; Admin Dose 75 MLS/HR;  Start 2/26/19 at 10:55


IV Flush (NS 3 ml) 3 ml PER PROTOCOL IV ;  Start 2/26/19 at 11:00


IV Flush (NS 3 ml) 3 ml PER PROTOCOL IV ;  Start 2/26/19 at 11:00


Acetaminophen (Tylenol Tab) 650 mg Q6H  PRN PO .PAIN 1-3 OR TEMP;  Start 2/26/19


at 11:00


Morphine Sulfate (morphine) 2 mg Q4H  PRN IV .SEVERE PAIN 7-10 Last administered


on 2/27/19at 23:07; Admin Dose 2 MG;  Start 2/26/19 at 11:00


Docusate Sodium (Colace) 100 mg Q12H  PRN PO .CONSTIPATION Last administered on 


2/27/19at 18:16; Admin Dose 100 MG;  Start 2/26/19 at 11:00


Zolpidem Tartrate (Ambien) 5 mg QHS  PRN PO .INSOMNIA;  Start 2/26/19 at 11:00


Famotidine (Pepcid) 20 mg DAILY PO  Last administered on 2/28/19at 08:43; Admin 


Dose 20 MG;  Start 2/26/19 at 11:30


Carvedilol (Coreg) 3.125 mg BID PO  Last administered on 2/28/19at 08:44; Admin 


Dose 3.125 MG;  Start 2/26/19 at 11:30


Diagnostic Test (Pha) (Accu-Chek) 1 ea 02 XX ;  Start 2/27/19 at 02:00


Insulin Aspart (Novolog Insulin Pen) NOVOLOG *MILD* ALGORITHM WITH MEALS  


BEDTIME SC ;  Start 2/26/19 at 12:00


Miscellaneous Information 1 ea NOTE XX ;  Start 2/26/19 at 12:30


Glucose (Glutose) 15 gm Q15M  PRN PO DECREASED GLUCOSE;  Start 2/26/19 at 12:30


Glucose (Glutose) 22.5 gm Q15M  PRN PO DECREASED GLUCOSE;  Start 2/26/19 at 


12:30


Dextrose (D50w Syringe) 25 ml Q15M  PRN IV DECREASED GLUCOSE;  Start 2/26/19 at 


12:30


Dextrose (D50w Syringe) 50 ml Q15M  PRN IV DECREASED GLUCOSE;  Start 2/26/19 at 


12:30


Glucagon (Glucagen) 1 mg Q15M  PRN IM DECREASED GLUCOSE;  Start 2/26/19 at 12:30


Glucose (Glutose) 15 gm Q15M  PRN BUCCAL DECREASED GLUCOSE;  Start 2/26/19 at 


12:30


Polyethylene Glycol (Miralax) 17 gm BID PO  Last administered on 2/28/19at 


08:43; Admin Dose 17 GM;  Start 2/27/19 at 09:00





VTE Prophylaxis


Risk score (from Nsg)>0 risk:  3


SCD applied (from Nsg):  Yes





Lines/Catheters


IV Catheter Type:  Saline Lock


Carbajal in Place:  Yes


Cont'd carbajal catheter reason:  urinary retention





Assessment/Plan


Assessment/Plan


58-year-old with large pelvic mass


History of synchronous uterine and ovarian cancer


Status post total abdominal hysterectomy in 2011


Urinary retention


Acute kidney injury





Continue current therapy


Nephrology and gynecology oncology follow-up


Patient needs surgical resection of the mass











JOY RAMIREZ MD                  Feb 28, 2019 09:50

## 2019-02-28 NOTE — CONS
Assessment/Plan


Assessment/Plan


Hospital Course (Demo Recall)


58-year-old female presented to the emergency room was pelvic pain.  She has 


been here before and had a CT scan which showed a pelvic mass and bilateral 


hydronephrosis and possible urinary retention.  The pelvic mass is pressing on 


her ureters causing bilateral hydronephrosis the patient was seen by Dr. Brown and


he is planning to operate on her.  Because of the hydronephrosis a urological c


onsultation was requested.


On the pelvic exam she does have a large pelvic mass pressing on the bladder.  I


did discuss the findings with her her daughter and her .  Explained to 


them doing cystoscopy and insertion of bilateral JJ stents.  And the need for 


the stents to be removed later on.  And the purpose of the stents which is to 


drain the kidneys and also make the ureters more identifiable during surgery and


therefore avoiding injuries to the ureters.  Discussed the risks of infection 


and bleeding.  Also the need to remove the stents later on once she is stable 


and recovering from the planned surgery  to remove the pelvic mass.





Consultation Date/Type/Reason


Admit Date/Time


2019 at 10:49


Date of Consultation:  2019


Type of Consult


Urology


Reason for Consultation


Bilateral hydronephrosis and urinary retention


Requesting Provider:  JOY RAMIREZ MD


Date/Time of Note


DATE: 19 


TIME: 19:10





Hx of Present Illness


58-year-old female presented to the emergency room was pelvic pain.  She has 


been here before and had a CT scan which showed a pelvic mass and bilateral 


hydronephrosis and possible urinary retention.  The pelvic mass is pressing on 


her ureters causing bilateral hydronephrosis the patient was seen by Dr. Brown and


he is planning to operate on her.  Because of the hydronephrosis a urological 


consultation was requested.


Constitutional:  no complaints


Eyes:  no complaints


ENT:  no complaints


Respiratory:  no complaints; 


   No wheezing


Cardiovascular:  no complaints; 


   No chest pain


Gastrointestinal:  pain (Lower abdomen), constipation (Pain with bowel move


ments), nausea; 


   No vomiting


Genitourinary:  dysuria, other (Difficulty urinating and urinary retention)


Musculoskeletal:  no complaints


Skin:  no complaints


Neurologic:  no complaints


Endocrine:  no complaints


Lymphatic:  no complaints


Psychological:  no complaints


Immunologic:  no complaints





Past Medical History


Medical History:  other (H/o Uterine Cancer s/p Hysterectomy )


Home Meds


Reported Medications


Tramadol Hcl* (Ultram*) 50 Mg Tablet, 50 MG PO Q6H PRN for PAIN, #15 TAB


   19


Cephalexin* (Cephalexin*) 500 Mg Capsule, 500 MG PO Q6 for 7 Days, #28 CAP


   19


Empagliflozin (Jardiance) 10 Mg Tablet, 10 MG PO DAILY, TAB


   18


Omeprazole* (Omeprazole*) 40 Mg Capsule.dr, 40 MG PO DAILY, #30 CAP


   18


Carvedilol* (Carvedilol*) 3.125 Mg Tablet, 3.125 MG PO BID, #60 TAB


   18


Benazepril Hcl* (Benazepril Hcl*) 40 Mg Tablet, 40 MG PO DAILY, #30 TAB


   18


Discontinued Reported Medications


Aspirin* (Aspirin* EC) 81 Mg Tablet.dr, 81 MG PO DAILY, TAB


   18


Discontinued Scripts


Acetaminophen with Codeine (Acetaminophen-Cod #3 Tablet) 1 Each Tablet, 1 TAB PO


Q6H PRN for PAIN, #10 TAB


   Prov:KATI TORRES DO         19


Dicyclomine HCl (Dicyclomine HCl) 10 Mg Capsule, 10 MG PO TID PRN for ABDOMINAL 


CRAMPING, #20 CAP


   Prov:BRY MELENDREZ MD         18


Hydrocodone/Acetaminophen (Norco 5-325 Tablet) 1 Each Tablet, 1 TAB PO Q6H PRN 


for PAIN, #20 TAB


   Prov:ENA JORDAN DO         18


Dicyclomine HCl (Dicyclomine HCl) 10 Mg Capsule, 20 MG PO TID PRN for ABDOMINAL 


CRAMPING, #20 CAP


   Prov:RAZIA JORDANSTBERYL DOMINGUEZ DO         18


Medications





Current Medications


Sodium Chloride 1,000 ml @  75 mls/hr [id number] IV  Last administered on 19at


16:19; Admin Dose 75 MLS/HR;  Start 19 at 10:55


IV Flush (NS 3 ml) 3 ml PER PROTOCOL IV ;  Start 19 at 11:00


IV Flush (NS 3 ml) 3 ml PER PROTOCOL IV ;  Start 19 at 11:00


Acetaminophen (Tylenol Tab) 650 mg Q6H  PRN PO .PAIN 1-3 OR TEMP;  Start 19


at 11:00


Morphine Sulfate (morphine) 2 mg Q4H  PRN IV .SEVERE PAIN 7-10 Last administered


on 19at 14:29; Admin Dose 2 MG;  Start 19 at 11:00


Docusate Sodium (Colace) 100 mg Q12H  PRN PO .CONSTIPATION Last administered on 


19at 18:16; Admin Dose 100 MG;  Start 19 at 11:00


Zolpidem Tartrate (Ambien) 5 mg QHS  PRN PO .INSOMNIA;  Start 19 at 11:00


Famotidine (Pepcid) 20 mg DAILY PO  Last administered on 19at 08:43; Admin 


Dose 20 MG;  Start 19 at 11:30


Carvedilol (Coreg) 3.125 mg BID PO  Last administered on 19at 08:44; Admin 


Dose 3.125 MG;  Start 19 at 11:30


Diagnostic Test (Pha) (Accu-Chek) 1 ea 02 XX ;  Start 19 at 02:00


Insulin Aspart (Novolog Insulin Pen) NOVOLOG *MILD* ALGORITHM WITH MEALS  


BEDTIME SC ;  Start 19 at 12:00


Miscellaneous Information 1 ea NOTE XX ;  Start 19 at 12:30


Glucose (Glutose) 15 gm Q15M  PRN PO DECREASED GLUCOSE;  Start 19 at 12:30


Glucose (Glutose) 22.5 gm Q15M  PRN PO DECREASED GLUCOSE;  Start 19 at 


12:30


Dextrose (D50w Syringe) 25 ml Q15M  PRN IV DECREASED GLUCOSE;  Start 19 at 


12:30


Dextrose (D50w Syringe) 50 ml Q15M  PRN IV DECREASED GLUCOSE;  Start 19 at 


12:30


Glucagon (Glucagen) 1 mg Q15M  PRN IM DECREASED GLUCOSE;  Start 19 at 12:30


Glucose (Glutose) 15 gm Q15M  PRN BUCCAL DECREASED GLUCOSE;  Start 19 at 


12:30


Polyethylene Glycol (Miralax) 17 gm BID PO  Last administered on 19at 


08:43; Admin Dose 17 GM;  Start 19 at 09:00


Clonidine (Catapres) 0.1 mg Q4H  PRN PO ELEVATED SYSTOLIC BP;  Start 19 at 


15:30


Allergies:  


Coded Allergies:  


     No Known Allergy (Unverified , 19)





Past Surgical History


Past Surgical Hx:  other (H/o Hysterectomy )





Social History


Alcohol Use:  none


Smoking Status:  Never smoker


Drug Use:  none


Other Social History


 3 para 2, 2 normal deliveries and 1 





Exam/Review of Systems


Exam


Vitals





Vital Signs


  Date      Temp  Pulse  Resp  B/P (MAP)   Pulse Ox  O2          O2 Flow    FiO2


Time                                                 Delivery    Rate


   19           65            159/70


     16:30                           (99)


   19  99.1           20                    97  Room Air


     14:30








Intake and Output





19





1515:00


23:00


07:00





IntakeIntake Total


780 ml


880 ml


650 ml





OutputOutput Total


800 ml





BalanceBalance


-20 ml


880 ml


650 ml











Constitutional:  alert, oriented


Psych:  no complaints


Head:  normocephalic


Eyes:  nl conjunctiva


ENMT:  nl external ears & nose


Neck:  supple, non-tender


Respiratory:  normal air movement; 


   No wheezing


Cardiovascular:  No jugular venous distention (JVD)


Gastrointestinal:  soft, other (Lower abdominal pain)


Genitourinary - Female:  other (Pelvic exam large mass pressing on the bladder)





Results


Result Diagram:  


19 0212                                                                    


           19 0803





Results 24hrs





Laboratory Tests


Test
                 19
20:43  19
08:03  19
08:11  19
12:31


Bedside Glucose               156                            91             92


Sodium Level                                145  H


Potassium Level                              4.3


Chloride Level                              112  H


Carbon Dioxide Level                          24


Anion Gap                                      9


Blood Urea Nitrogen                           16


Creatinine                                 1.94  H


Est Glomerular        
                     27  L
  
              



Filtrat Rate
mL/min


Glucose Level                                 94


Calcium Level                                8.4


Test
                 19
17:23  
              
              



Bedside Glucose                97








Imaging


Imaging


CT scan of the abdomen and pelvis:


1. STATUS POST HYSTERECTOMY AGAIN IDENTIFIED IS A LARGE CYSTIC MASS MEASURING 


11.5 X 9.7 CM WITH RIGHT SIDED MURAL NODULE. SUSPECT OVARIAN ORIGIN, WORRISOME 


FOR OVARIAN NEOPLASM. THIS HAS INCREASED IN SIZE SINCE PRIOR EXAMINATION.


 


2.  THERE IS MASS EFFECT ON THE BILATERAL DISTAL URETERS, WITH ASSOCIATED 


BILATERAL HYDROURETERONEPHROSIS, WHICH HAS WORSENED SINCE PRIOR EXAMINATION. 


THERE IS ALSO MASS EFFECT ON THE POSTERIOR WALL THE BLADDER WHICH IS DISTENDED. 


RECOMMEND DECOMPRESSION.


 


3.  2.7 cm right adrenal gland nodule, cannot exclude metastatic disease.


 


4. No evidence of bowel obstruction. Stool filled loops of large bowel pino


ggestive of constipation.





Renal ultrasound:





 1.  Normal sized kidneys with normal renal parenchymal echogenicity.


2.  Mild bilateral hydronephrosis without renal calculi demonstrated.


3.  Multiple small left renal cyst the largest measuring 0.9 cm.


4.  Unremarkable urinary bladder.





Medications


Medication





Current Medications


Sodium Chloride 1,000 ml @  75 mls/hr [id number] IV  Last administered on 19at


16:19; Admin Dose 75 MLS/HR;  Start 19 at 10:55


IV Flush (NS 3 ml) 3 ml PER PROTOCOL IV ;  Start 19 at 11:00


IV Flush (NS 3 ml) 3 ml PER PROTOCOL IV ;  Start 19 at 11:00


Acetaminophen (Tylenol Tab) 650 mg Q6H  PRN PO .PAIN 1-3 OR TEMP;  Start 19


at 11:00


Morphine Sulfate (morphine) 2 mg Q4H  PRN IV .SEVERE PAIN 7-10 Last administered


on 19at 14:29; Admin Dose 2 MG;  Start 19 at 11:00


Docusate Sodium (Colace) 100 mg Q12H  PRN PO .CONSTIPATION Last administered on 


19at 18:16; Admin Dose 100 MG;  Start 19 at 11:00


Zolpidem Tartrate (Ambien) 5 mg QHS  PRN PO .INSOMNIA;  Start 19 at 11:00


Famotidine (Pepcid) 20 mg DAILY PO  Last administered on 19at 08:43; Admin 


Dose 20 MG;  Start 19 at 11:30


Carvedilol (Coreg) 3.125 mg BID PO  Last administered on 19at 08:44; Admin 


Dose 3.125 MG;  Start 19 at 11:30


Diagnostic Test (Pha) (Accu-Chek) 1 ea 02 XX ;  Start 19 at 02:00


Insulin Aspart (Novolog Insulin Pen) NOVOLOG *MILD* ALGORITHM WITH MEALS  


BEDTIME SC ;  Start 19 at 12:00


Miscellaneous Information 1 ea NOTE XX ;  Start 19 at 12:30


Glucose (Glutose) 15 gm Q15M  PRN PO DECREASED GLUCOSE;  Start 19 at 12:30


Glucose (Glutose) 22.5 gm Q15M  PRN PO DECREASED GLUCOSE;  Start 19 at 


12:30


Dextrose (D50w Syringe) 25 ml Q15M  PRN IV DECREASED GLUCOSE;  Start 19 at 


12:30


Dextrose (D50w Syringe) 50 ml Q15M  PRN IV DECREASED GLUCOSE;  Start 19 at 


12:30


Glucagon (Glucagen) 1 mg Q15M  PRN IM DECREASED GLUCOSE;  Start 19 at 12:30


Glucose (Glutose) 15 gm Q15M  PRN BUCCAL DECREASED GLUCOSE;  Start 19 at 


12:30


Polyethylene Glycol (Miralax) 17 gm BID PO  Last administered on 19at 


08:43; Admin Dose 17 GM;  Start 19 at 09:00


Clonidine (Catapres) 0.1 mg Q4H  PRN PO ELEVATED SYSTOLIC BP;  Start 19 at 


15:30











TONIE ALICEA MD            2019 19:20

## 2019-02-28 NOTE — CONS
Assessment/Plan


Assessment/Plan


Assessment/Plan (Daily)


1. Acute kidney injury due to possible prerenal azotemia + possibely obstruction


from mass


2.  H/o Uterine CA s/p hysterectomy, now concerned about recurrence of tumour 


3. pelvis mass, pssible recurrence 


4. Hydronephrosis on previous CT scan possibly due to obstruction 


5. H/O HTN


6. H/o DM II





Plan:


BUN/Cr improved to 16/1.94, Na 145 Bp stable, US renal showed mild bilateral 


hydronephrosis, Urology has been consulted for Cystoscopy and stent placement 


s/p GYN oncology consult - pt will need surgical resection of pelvis mass 


IVF NS at 75 cc/hr


Uric acid 5.8


will follow up





Consultation Date/Type/Reason


Admit Date/Time


Feb 26, 2019 at 10:49


Initial Consult Date


2/26/19


Type of Consult


NEPHROLOGY


Requesting Provider:  JOY RAMIREZ MD


Date/Time of Note


DATE: 2/28/19 


TIME: 10:25





24 HR Interval Summary


Free Text/Dictation


Urology consulted for cystoscopy and possibel stent placement


pt will need OR for pelvic mass, GYN oncology has been following on patient





Exam/Review of Systems


Exam


Vitals





Vital Signs


  Date      Temp  Pulse  Resp  B/P (MAP)   Pulse Ox  O2          O2 Flow    FiO2


Time                                                 Delivery    Rate


   2/28/19  98.6           20      172/85        96  Room Air


     08:00                          (114)


   2/28/19           62


     02:31








Intake and Output





2/27/19 2/27/19 2/28/19





1414:59


22:59


06:59





IntakeIntake Total


780 ml


880 ml


650 ml





OutputOutput Total


800 ml





BalanceBalance


-20 ml


880 ml


650 ml











Exam


Constitutional:  alert


Respiratory:  clear to auscultation, normal air movement


Cardiovascular:  regular rate and rhythm, nl pulses


Gastrointestinal:  soft, non-tender


Musculoskeletal:  nl extremities to inspection


Extremities:  normal pulses


Neurological:  CNS II-XII intact, nl mental status, nl speech, nl strength





Results


Result Diagram:  


2/26/19 0212                                                                    


           2/28/19 0803





Results 24hrs





Laboratory Tests


Test
                 2/27/19
12:46  2/27/19
17:29  2/27/19
20:43  2/28/19
08:03


Bedside Glucose                93             91            156


Sodium Level                                                              145  H


Potassium Level                                                            4.3


Chloride Level                                                            112  H


Carbon Dioxide Level                                                        24


Anion Gap                                                                    9


Blood Urea Nitrogen                                                         16


Creatinine                                                               1.94  H


Est Glomerular        
              
              
                     27  L



Filtrat Rate
mL/min


Glucose Level                                                               94


Calcium Level                                                              8.4


Test
                 2/28/19
08:11  
              
              



Bedside Glucose                91








Medications


Medication





Current Medications


Sodium Chloride 1,000 ml @  75 mls/hr [id number] IV  Last administered on 2/28/19at


03:27; Admin Dose 75 MLS/HR;  Start 2/26/19 at 10:55


IV Flush (NS 3 ml) 3 ml PER PROTOCOL IV ;  Start 2/26/19 at 11:00


IV Flush (NS 3 ml) 3 ml PER PROTOCOL IV ;  Start 2/26/19 at 11:00


Acetaminophen (Tylenol Tab) 650 mg Q6H  PRN PO .PAIN 1-3 OR TEMP;  Start 2/26/19


at 11:00


Morphine Sulfate (morphine) 2 mg Q4H  PRN IV .SEVERE PAIN 7-10 Last administered


on 2/28/19at 10:11; Admin Dose 2 MG;  Start 2/26/19 at 11:00


Docusate Sodium (Colace) 100 mg Q12H  PRN PO .CONSTIPATION Last administered on 


2/27/19at 18:16; Admin Dose 100 MG;  Start 2/26/19 at 11:00


Zolpidem Tartrate (Ambien) 5 mg QHS  PRN PO .INSOMNIA;  Start 2/26/19 at 11:00


Famotidine (Pepcid) 20 mg DAILY PO  Last administered on 2/28/19at 08:43; Admin 


Dose 20 MG;  Start 2/26/19 at 11:30


Carvedilol (Coreg) 3.125 mg BID PO  Last administered on 2/28/19at 08:44; Admin 


Dose 3.125 MG;  Start 2/26/19 at 11:30


Diagnostic Test (Pha) (Accu-Chek) 1 ea 02 XX ;  Start 2/27/19 at 02:00


Insulin Aspart (Novolog Insulin Pen) NOVOLOG *MILD* ALGORITHM WITH MEALS  


BEDTIME SC ;  Start 2/26/19 at 12:00


Miscellaneous Information 1 ea NOTE XX ;  Start 2/26/19 at 12:30


Glucose (Glutose) 15 gm Q15M  PRN PO DECREASED GLUCOSE;  Start 2/26/19 at 12:30


Glucose (Glutose) 22.5 gm Q15M  PRN PO DECREASED GLUCOSE;  Start 2/26/19 at 


12:30


Dextrose (D50w Syringe) 25 ml Q15M  PRN IV DECREASED GLUCOSE;  Start 2/26/19 at 


12:30


Dextrose (D50w Syringe) 50 ml Q15M  PRN IV DECREASED GLUCOSE;  Start 2/26/19 at 


12:30


Glucagon (Glucagen) 1 mg Q15M  PRN IM DECREASED GLUCOSE;  Start 2/26/19 at 12:30


Glucose (Glutose) 15 gm Q15M  PRN BUCCAL DECREASED GLUCOSE;  Start 2/26/19 at 


12:30


Polyethylene Glycol (Miralax) 17 gm BID PO  Last administered on 2/28/19at 


08:43; Admin Dose 17 GM;  Start 2/27/19 at 09:00











MARGIE GODINEZ MD           Feb 28, 2019 10:25

## 2019-03-01 VITALS — RESPIRATION RATE: 22 BRPM | SYSTOLIC BLOOD PRESSURE: 190 MMHG | HEART RATE: 70 BPM | DIASTOLIC BLOOD PRESSURE: 82 MMHG

## 2019-03-01 VITALS — HEART RATE: 88 BPM | DIASTOLIC BLOOD PRESSURE: 83 MMHG | RESPIRATION RATE: 14 BRPM | SYSTOLIC BLOOD PRESSURE: 168 MMHG

## 2019-03-01 VITALS — HEART RATE: 78 BPM | SYSTOLIC BLOOD PRESSURE: 168 MMHG | RESPIRATION RATE: 21 BRPM | DIASTOLIC BLOOD PRESSURE: 78 MMHG

## 2019-03-01 VITALS — HEART RATE: 62 BPM | RESPIRATION RATE: 18 BRPM | SYSTOLIC BLOOD PRESSURE: 175 MMHG | DIASTOLIC BLOOD PRESSURE: 81 MMHG

## 2019-03-01 VITALS — RESPIRATION RATE: 18 BRPM | SYSTOLIC BLOOD PRESSURE: 166 MMHG | DIASTOLIC BLOOD PRESSURE: 77 MMHG | HEART RATE: 57 BPM

## 2019-03-01 VITALS — HEART RATE: 68 BPM | SYSTOLIC BLOOD PRESSURE: 200 MMHG | RESPIRATION RATE: 22 BRPM | DIASTOLIC BLOOD PRESSURE: 94 MMHG

## 2019-03-01 VITALS — HEART RATE: 80 BPM | RESPIRATION RATE: 18 BRPM | SYSTOLIC BLOOD PRESSURE: 149 MMHG | DIASTOLIC BLOOD PRESSURE: 69 MMHG

## 2019-03-01 VITALS — SYSTOLIC BLOOD PRESSURE: 178 MMHG | HEART RATE: 72 BPM | DIASTOLIC BLOOD PRESSURE: 79 MMHG | RESPIRATION RATE: 24 BRPM

## 2019-03-01 VITALS — DIASTOLIC BLOOD PRESSURE: 64 MMHG | HEART RATE: 64 BPM | RESPIRATION RATE: 18 BRPM | SYSTOLIC BLOOD PRESSURE: 158 MMHG

## 2019-03-01 VITALS — HEART RATE: 84 BPM | SYSTOLIC BLOOD PRESSURE: 160 MMHG | DIASTOLIC BLOOD PRESSURE: 76 MMHG | RESPIRATION RATE: 18 BRPM

## 2019-03-01 VITALS — RESPIRATION RATE: 16 BRPM | HEART RATE: 86 BPM | SYSTOLIC BLOOD PRESSURE: 156 MMHG | DIASTOLIC BLOOD PRESSURE: 72 MMHG

## 2019-03-01 VITALS — RESPIRATION RATE: 17 BRPM | HEART RATE: 56 BPM | DIASTOLIC BLOOD PRESSURE: 91 MMHG | SYSTOLIC BLOOD PRESSURE: 202 MMHG

## 2019-03-01 VITALS — RESPIRATION RATE: 16 BRPM | DIASTOLIC BLOOD PRESSURE: 81 MMHG | HEART RATE: 82 BPM | SYSTOLIC BLOOD PRESSURE: 170 MMHG

## 2019-03-01 VITALS — RESPIRATION RATE: 17 BRPM | HEART RATE: 78 BPM | DIASTOLIC BLOOD PRESSURE: 78 MMHG | SYSTOLIC BLOOD PRESSURE: 166 MMHG

## 2019-03-01 VITALS — DIASTOLIC BLOOD PRESSURE: 81 MMHG | SYSTOLIC BLOOD PRESSURE: 169 MMHG | HEART RATE: 82 BPM | RESPIRATION RATE: 23 BRPM

## 2019-03-01 VITALS — DIASTOLIC BLOOD PRESSURE: 81 MMHG | HEART RATE: 72 BPM | SYSTOLIC BLOOD PRESSURE: 174 MMHG | RESPIRATION RATE: 21 BRPM

## 2019-03-01 VITALS — SYSTOLIC BLOOD PRESSURE: 181 MMHG | HEART RATE: 61 BPM | RESPIRATION RATE: 19 BRPM | DIASTOLIC BLOOD PRESSURE: 79 MMHG

## 2019-03-01 VITALS — SYSTOLIC BLOOD PRESSURE: 198 MMHG | HEART RATE: 66 BPM | DIASTOLIC BLOOD PRESSURE: 80 MMHG | RESPIRATION RATE: 22 BRPM

## 2019-03-01 VITALS — HEART RATE: 62 BPM | RESPIRATION RATE: 23 BRPM | DIASTOLIC BLOOD PRESSURE: 85 MMHG | SYSTOLIC BLOOD PRESSURE: 191 MMHG

## 2019-03-01 VITALS — SYSTOLIC BLOOD PRESSURE: 177 MMHG | HEART RATE: 80 BPM | RESPIRATION RATE: 28 BRPM | DIASTOLIC BLOOD PRESSURE: 85 MMHG

## 2019-03-01 VITALS — RESPIRATION RATE: 19 BRPM | HEART RATE: 78 BPM | DIASTOLIC BLOOD PRESSURE: 77 MMHG | SYSTOLIC BLOOD PRESSURE: 158 MMHG

## 2019-03-01 VITALS — HEART RATE: 42 BPM | DIASTOLIC BLOOD PRESSURE: 91 MMHG | RESPIRATION RATE: 19 BRPM | SYSTOLIC BLOOD PRESSURE: 202 MMHG

## 2019-03-01 VITALS — DIASTOLIC BLOOD PRESSURE: 74 MMHG | RESPIRATION RATE: 23 BRPM | HEART RATE: 68 BPM | SYSTOLIC BLOOD PRESSURE: 199 MMHG

## 2019-03-01 LAB
ANION GAP: 9 (ref 5–13)
BLOOD UREA NITROGEN: 17 MG/DL (ref 7–20)
CALCIUM: 8.4 MG/DL (ref 8.4–10.2)
CARBON DIOXIDE: 24 MMOL/L (ref 21–31)
CHLORIDE: 111 MMOL/L (ref 97–110)
CREATININE: 1.94 MG/DL (ref 0.44–1)
GLUCOSE: 108 MG/DL (ref 70–220)
POTASSIUM: 4 MMOL/L (ref 3.5–5.1)
SODIUM: 144 MMOL/L (ref 135–144)

## 2019-03-01 PROCEDURE — 0TNB0ZZ RELEASE BLADDER, OPEN APPROACH: ICD-10-PCS

## 2019-03-01 PROCEDURE — 0DB80ZZ EXCISION OF SMALL INTESTINE, OPEN APPROACH: ICD-10-PCS

## 2019-03-01 PROCEDURE — 0DNU0ZZ RELEASE OMENTUM, OPEN APPROACH: ICD-10-PCS

## 2019-03-01 PROCEDURE — 0T788DZ DILATION OF BILATERAL URETERS WITH INTRALUMINAL DEVICE, VIA NATURAL OR ARTIFICIAL OPENING ENDOSCOPIC: ICD-10-PCS | Performed by: UROLOGY

## 2019-03-01 PROCEDURE — 0DNN0ZZ RELEASE SIGMOID COLON, OPEN APPROACH: ICD-10-PCS

## 2019-03-01 PROCEDURE — 0T788DZ DILATION OF BILATERAL URETERS WITH INTRALUMINAL DEVICE, VIA NATURAL OR ARTIFICIAL OPENING ENDOSCOPIC: ICD-10-PCS

## 2019-03-01 RX ADMIN — MORPHINE SULFATE 1 MG: 2 INJECTION, SOLUTION INTRAMUSCULAR; INTRAVENOUS at 21:17

## 2019-03-01 RX ADMIN — POLYETHYLENE GLYCOL 3350 1 GM: 17 POWDER, FOR SOLUTION ORAL at 09:00

## 2019-03-01 RX ADMIN — THIAMINE HYDROCHLORIDE 1 MLS/HR: 100 INJECTION, SOLUTION INTRAMUSCULAR; INTRAVENOUS at 05:28

## 2019-03-01 RX ADMIN — MORPHINE SULFATE PRN MG: 2 INJECTION, SOLUTION INTRAMUSCULAR; INTRAVENOUS at 16:08

## 2019-03-01 RX ADMIN — FAMOTIDINE SCH MG: 20 TABLET ORAL at 09:00

## 2019-03-01 RX ADMIN — IOHEXOL 1 ML: 300 INJECTION, SOLUTION INTRAVENOUS at 12:45

## 2019-03-01 RX ADMIN — HYDRALAZINE HYDROCHLORIDE 1 MG: 20 INJECTION INTRAMUSCULAR; INTRAVENOUS at 14:15

## 2019-03-01 RX ADMIN — POLYETHYLENE GLYCOL 3350 SCH GM: 17 POWDER, FOR SOLUTION ORAL at 09:00

## 2019-03-01 RX ADMIN — INSULIN ASPART 1 UNIT: 100 INJECTION, SOLUTION INTRAVENOUS; SUBCUTANEOUS at 17:32

## 2019-03-01 RX ADMIN — INSULIN ASPART 1 UNIT: 100 INJECTION, SOLUTION INTRAVENOUS; SUBCUTANEOUS at 11:52

## 2019-03-01 RX ADMIN — FENTANYL CITRATE 1 MCG: 50 INJECTION, SOLUTION INTRAMUSCULAR; INTRAVENOUS at 15:07

## 2019-03-01 RX ADMIN — FOLIC ACID SCH MLS/HR: 5 INJECTION, SOLUTION INTRAMUSCULAR; INTRAVENOUS; SUBCUTANEOUS at 05:28

## 2019-03-01 RX ADMIN — POLYETHYLENE GLYCOL 3350 SCH GM: 17 POWDER, FOR SOLUTION ORAL at 21:13

## 2019-03-01 RX ADMIN — FAMOTIDINE 1 MG: 20 TABLET ORAL at 09:00

## 2019-03-01 RX ADMIN — INSULIN ASPART 1 UNIT: 100 INJECTION, SOLUTION INTRAVENOUS; SUBCUTANEOUS at 21:00

## 2019-03-01 RX ADMIN — MORPHINE SULFATE PRN MG: 2 INJECTION, SOLUTION INTRAMUSCULAR; INTRAVENOUS at 11:13

## 2019-03-01 RX ADMIN — INSULIN ASPART 1 UNIT: 100 INJECTION, SOLUTION INTRAVENOUS; SUBCUTANEOUS at 08:00

## 2019-03-01 RX ADMIN — MORPHINE SULFATE 1 MG: 2 INJECTION, SOLUTION INTRAMUSCULAR; INTRAVENOUS at 11:13

## 2019-03-01 RX ADMIN — POLYETHYLENE GLYCOL 3350 1 GM: 17 POWDER, FOR SOLUTION ORAL at 21:13

## 2019-03-01 RX ADMIN — MORPHINE SULFATE PRN MG: 2 INJECTION, SOLUTION INTRAMUSCULAR; INTRAVENOUS at 21:17

## 2019-03-01 RX ADMIN — MORPHINE SULFATE 1 MG: 2 INJECTION, SOLUTION INTRAMUSCULAR; INTRAVENOUS at 16:08

## 2019-03-01 NOTE — PDOCDIS
Discharge Instructions


CONDITION


                 [id number]
Patient Condition:  [id number]
Good








HOME CARE INSTRUCTIONS:


                [id number]
Diet Instructions:  [id number]







FOLLOW UP/APPOINTMENTS


Follow-up Plan


return monday am for surgury


do not eat any thing after mid night sunday











JOY RAMIREZ MD                   Mar 1, 2019 10:22

## 2019-03-01 NOTE — OPR
Date/Time of Note


Date/Time of Note


DATE: 3/1/19 


TIME: 14:02





Operative Report


Procedure Date:  Mar 1, 2019


Preoperative Diagnosis


Bilateral hydronephrosis secondary to ureteral obstruction by pelvic tumor.


Postoperative Diagnosis


Same


Operation/Procedure Performed


Cystoscopy and insertion of bilateral ureteral JJ stents, 6 [place] by 26 cm long


on the right side, 6 [place] by 24 cm long on the left.


Surgeon


see signature line


Assistant


Brentonub abdon Clemente


Anesthesia Type:  general


Anesthesiologist:  MEGAN COPELAND MD


Estimated Blood Loss:  minimal


Transfusion


   none


Specimen


None


Grafts/Implants


none


Complications


none


Pt Condition Post Procedure:  stable


Disposition:  PACU


Indications


Bilateral hydronephrosis and acute kidney injury secondary to pelvic tumor.  The


tumor is pushing on the bladder anteriorly and causing obstruction and urinary 


retention


Procedure Description


The patient was brought to the operating room and general anesthesia was 


induced.  The patient was positioned in the lithotomy position.  The genital 


area was then prepped and draped in the usual sterile manner.  Timeout was done 


and the patient was identified by her name, birthdate, the procedure.  She was 


given 2 g of Ancef IV at the start of the procedure.  #21 Gib[place]arian cystoscope 


sheath was then introduced into the bladder.  The bladder was empty as the 


patient did have a Tidwell catheter prior to the procedure.  The trigone is pushed


anteriorly by a large mass in the pelvis .  The anatomy and the trigone was 


completely distorted by the extrinsic pressure from the pelvic tumor.  After 


long and tedious looking I was able to locate the right ureteral orifice and 


then cannulated with a 5 [place] open ended ureteral catheter and passed a 0.035 


zip wire through it all the way to the kidney.  Then I did a retrograde 


pyelogram.  The right ureter was tortuous and mostly obstructed below the 


sacroiliac joint area.  It was very tight on the 5 [place]arian ureteral catheter.  I 


had then to exchange the zip wire to a Super Stiff wire so I will be able to 


advance the stent on the wire then I used a 6 [place] by 26 cm long JJ stent and 


advanced it until it proximal and coiled into the kidney.  The distal end coiled


into the bladder.  Then attention was on the left side and again after some time


I was able to locate the left ureteral orifice and cannulated with the 5 [place]arian 


open ended and pass the wire up to the kidney then did a retrograde pyelogram 


and again the left ureter was tortuous and dilated.  I inserted a 6 [place] by 24


cm long JJ stent.  The proximal end coiled into the kidney and the distal end 


coiled into the bladder.  I inserted a 16 Gib[place]arian Tidwell catheter after that to 


make sure that the bladder drains as the pelvic tumor is causing pressure and 


closing up the bladder neck.  The patient tolerated the procedure well and was 


transferred to the recovery room in a stable and satisfactory condition.











TONIE ALICEA MD             Mar 1, 2019 14:10

## 2019-03-01 NOTE — PAC
Date/Time of Note


Date/Time of Note


DATE: 3/1/19 


TIME: 14:08





Post-Anesthesia Notes


Post-Anesthesia Note


Last documented vital signs





Vital Signs


  Date      Temp  Pulse  Resp  B/P (MAP)   Pulse Ox  O2          O2 Flow    FiO2


Time                                                 Delivery    Rate


    3/1/19           61    19      181/79


     11:54                          (113)


    3/1/19  98.5                                 94


     07:44


   2/28/19                                           Room Air


     14:30





Activity:  WNL


Respiratory function:  WNL


Cardiovascular function:  WNL


Mental status:  Baseline


Pain reasonably controlled:  Yes


Hydration appropriate:  Yes


Nausea/Vomiting absent:  Yes











MEGAN COPELAND MD                Mar 1, 2019 14:09

## 2019-03-01 NOTE — HPN
Date/Time of Note


Date/Time of Note


DATE: 3/1/19 


TIME: 12:24





Interval H&P Admission Note


Pt. seen H&P reviewed:  No system changes











TONIE ALICEA MD             Mar 1, 2019 12:24

## 2019-03-01 NOTE — CONS
Assessment/Plan


Assessment/Plan


Assessment/Plan (Daily)


1. Acute kidney injury due to possible prerenal azotemia + possibely obstruction


from mass


2.  H/o Uterine CA s/p hysterectomy, now concerned about recurrence of tumour 


3. pelvis mass, pssible recurrence 


4. Hydronephrosis on previous CT scan possibly due to obstruction 


5. H/O HTN


6. H/o DM II





Plan:


BUN/Cr improved to 17/1.94, Na 144 Bp stable, US renal showed mild bilateral 


hydronephrosis, Urology has been consulted for Cystoscopy and stent placement- 


possible plan for today 


s/p GYN oncology consult - pt will need surgical resection of pelvis mass 


IVF NS at 75 cc/hr


Uric acid 5.8


will follow up





Consultation Date/Type/Reason


Admit Date/Time


Feb 26, 2019 at 10:49


Initial Consult Date


2/26/19


Type of Consult


NEPHROLOGY


Requesting Provider:  JOY RAMIREZ MD


Date/Time of Note


DATE: 3/1/19 


TIME: 11:43





Exam/Review of Systems


Exam


Vitals





Vital Signs


  Date      Temp  Pulse  Resp  B/P (MAP)   Pulse Ox  O2          O2 Flow    FiO2


Time                                                 Delivery    Rate


    3/1/19  98.5     62    18      175/81        94


     07:44                          (112)


   2/28/19                                           Room Air


     14:30








Intake and Output





2/28/19


2/28/19


3/1/19





1515:00


23:00


07:00





IntakeIntake Total


750 ml


500 ml





OutputOutput Total


2000 ml





BalanceBalance


-2000 ml


750 ml


500 ml











Exam


Constitutional:  alert


Respiratory:  clear to auscultation, normal air movement


Cardiovascular:  regular rate and rhythm, nl pulses


Gastrointestinal:  soft, non-tender


Musculoskeletal:  nl extremities to inspection


Extremities:  normal pulses


Neurological:  CNS II-XII intact, nl mental status, nl speech, nl strength





Results


Result Diagram:  


2/26/19 0212                                                                    


           3/1/19 0423





Results 24hrs





Laboratory Tests


Test
                2/28/19
12:31    2/28/19
17:23  2/28/19
20:33  3/1/19
04:23


Bedside Glucose               92               97             99


Sodium Level                                                               144


Potassium Level                                                            4.0


Chloride Level                                                            111  H


Carbon Dioxide                                                              24


Level


Anion Gap                                                                    9


Blood Urea Nitrogen                                                         17


Creatinine                                                               1.94  H


Est Glomerular       
              
                
                    27  L



Filtrat Rate
mL/min


Glucose Level                                                              108


Calcium Level                                                              8.4


Test
                 3/1/19
08:27     3/1/19
09:32  
              



Bedside Glucose               91


Lab Scanned Report                  REFERENCE LAB








Medications


Medication





Current Medications


Sodium Chloride 1,000 ml @  75 mls/hr [id number] IV  Last administered on 3/1/19at 


05:28; Admin Dose 75 MLS/HR;  Start 2/26/19 at 10:55


IV Flush (NS 3 ml) 3 ml PER PROTOCOL IV ;  Start 2/26/19 at 11:00


IV Flush (NS 3 ml) 3 ml PER PROTOCOL IV ;  Start 2/26/19 at 11:00


Acetaminophen (Tylenol Tab) 650 mg Q6H  PRN PO .PAIN 1-3 OR TEMP;  Start 2/26/19


at 11:00


Morphine Sulfate (morphine) 2 mg Q4H  PRN IV .SEVERE PAIN 7-10 Last administered


on 3/1/19at 11:13; Admin Dose 2 MG;  Start 2/26/19 at 11:00


Docusate Sodium (Colace) 100 mg Q12H  PRN PO .CONSTIPATION Last administered on 


2/27/19at 18:16; Admin Dose 100 MG;  Start 2/26/19 at 11:00


Zolpidem Tartrate (Ambien) 5 mg QHS  PRN PO .INSOMNIA;  Start 2/26/19 at 11:00


Famotidine (Pepcid) 20 mg DAILY PO  Last administered on 2/28/19at 08:43; Admin 


Dose 20 MG;  Start 2/26/19 at 11:30


Carvedilol (Coreg) 3.125 mg BID PO  Last administered on 3/1/19at 08:59; Admin 


Dose 3.125 MG;  Start 2/26/19 at 11:30


Diagnostic Test (Pha) (Accu-Chek) 1 ea 02 XX ;  Start 2/27/19 at 02:00


Insulin Aspart (Novolog Insulin Pen) NOVOLOG *MILD* ALGORITHM WITH MEALS  


BEDTIME SC ;  Start 2/26/19 at 12:00


Miscellaneous Information 1 ea NOTE XX ;  Start 2/26/19 at 12:30


Glucose (Glutose) 15 gm Q15M  PRN PO DECREASED GLUCOSE;  Start 2/26/19 at 12:30


Glucose (Glutose) 22.5 gm Q15M  PRN PO DECREASED GLUCOSE;  Start 2/26/19 at 


12:30


Dextrose (D50w Syringe) 25 ml Q15M  PRN IV DECREASED GLUCOSE;  Start 2/26/19 at 


12:30


Dextrose (D50w Syringe) 50 ml Q15M  PRN IV DECREASED GLUCOSE;  Start 2/26/19 at 


12:30


Glucagon (Glucagen) 1 mg Q15M  PRN IM DECREASED GLUCOSE;  Start 2/26/19 at 12:30


Glucose (Glutose) 15 gm Q15M  PRN BUCCAL DECREASED GLUCOSE;  Start 2/26/19 at 


12:30


Polyethylene Glycol (Miralax) 17 gm BID PO  Last administered on 2/28/19at 


20:40; Admin Dose 17 GM;  Start 2/27/19 at 09:00


Clonidine (Catapres) 0.1 mg Q4H  PRN PO ELEVATED SYSTOLIC BP Last administered 


on 2/28/19at 20:40; Admin Dose 0.1 MG;  Start 2/28/19 at 15:30











MARGIE GODINEZ MD            Mar 1, 2019 11:43

## 2019-03-01 NOTE — PREAC
Date/Time of Note


Date/Time of Note


DATE: 3/1/19 


TIME: 12:16





Anesthesia Eval and Record


Evaluation


Time Pre-Procedure Interview


DATE: 3/1/19 


TIME: 12:16


Age


58


Sex


female


NPO:  8 hrs


Preoperative diagnosis


ureteral obstruction


Planned procedure


cystoscopy, JJ stent placement





Past Medical History


Past Medical History:  Includes


Cardio:  HTN, Dyslipidemia


Endo:  Diabetes


Renal:  ZOEY, CKD, Other (pelvic mass with urologic obstruction)





Surgery & Anesthesia Issues


No known issue





Meds


Anticoagulation:  No


Beta Blocker within 24 hr:  Yes


Reported Medications


Tramadol Hcl* (Ultram*) 50 Mg Tablet, 50 MG PO Q6H PRN for PAIN, #15 TAB


   2/26/19


Cephalexin* (Cephalexin*) 500 Mg Capsule, 500 MG PO Q6 for 7 Days, #28 CAP


   2/26/19


Empagliflozin (Jardiance) 10 Mg Tablet, 10 MG PO DAILY, TAB


   12/29/18


Omeprazole* (Omeprazole*) 40 Mg Capsule.dr, 40 MG PO DAILY, #30 CAP


   12/29/18


Carvedilol* (Carvedilol*) 3.125 Mg Tablet, 3.125 MG PO BID, #60 TAB


   12/29/18


Benazepril Hcl* (Benazepril Hcl*) 40 Mg Tablet, 40 MG PO DAILY, #30 TAB


   12/29/18


Discontinued Scripts


Acetaminophen with Codeine (Acetaminophen-Cod #3 Tablet) 1 Each Tablet, 1 TAB PO


Q6H PRN for PAIN, #10 TAB


   Prov:YARIROBINKATI DO         2/21/19





Current Medications


Sodium Chloride 1,000 ml @  75 mls/hr [id number] IV  Last administered on 3/1/19at 


05:28; Admin Dose 75 MLS/HR;  Start 2/26/19 at 10:55


IV Flush (NS 3 ml) 3 ml PER PROTOCOL IV ;  Start 2/26/19 at 11:00


IV Flush (NS 3 ml) 3 ml PER PROTOCOL IV ;  Start 2/26/19 at 11:00


Acetaminophen (Tylenol Tab) 650 mg Q6H  PRN PO .PAIN 1-3 OR TEMP;  Start 2/26/19


at 11:00


Morphine Sulfate (morphine) 2 mg Q4H  PRN IV .SEVERE PAIN 7-10 Last administered


on 3/1/19at 11:13; Admin Dose 2 MG;  Start 2/26/19 at 11:00


Docusate Sodium (Colace) 100 mg Q12H  PRN PO .CONSTIPATION Last administered on 


2/27/19at 18:16; Admin Dose 100 MG;  Start 2/26/19 at 11:00


Zolpidem Tartrate (Ambien) 5 mg QHS  PRN PO .INSOMNIA;  Start 2/26/19 at 11:00


Famotidine (Pepcid) 20 mg DAILY PO  Last administered on 2/28/19at 08:43; Admin 


Dose 20 MG;  Start 2/26/19 at 11:30


Carvedilol (Coreg) 3.125 mg BID PO  Last administered on 3/1/19at 08:59; Admin 


Dose 3.125 MG;  Start 2/26/19 at 11:30


Diagnostic Test (Pha) (Accu-Chek) 1 ea 02 XX ;  Start 2/27/19 at 02:00


Insulin Aspart (Novolog Insulin Pen) NOVOLOG *MILD* ALGORITHM WITH MEALS  


BEDTIME SC ;  Start 2/26/19 at 12:00


Miscellaneous Information 1 ea NOTE XX ;  Start 2/26/19 at 12:30


Glucose (Glutose) 15 gm Q15M  PRN PO DECREASED GLUCOSE;  Start 2/26/19 at 12:30


Glucose (Glutose) 22.5 gm Q15M  PRN PO DECREASED GLUCOSE;  Start 2/26/19 at 12:3


0


Dextrose (D50w Syringe) 25 ml Q15M  PRN IV DECREASED GLUCOSE;  Start 2/26/19 at 


12:30


Dextrose (D50w Syringe) 50 ml Q15M  PRN IV DECREASED GLUCOSE;  Start 2/26/19 at 


12:30


Glucagon (Glucagen) 1 mg Q15M  PRN IM DECREASED GLUCOSE;  Start 2/26/19 at 12:30


Glucose (Glutose) 15 gm Q15M  PRN BUCCAL DECREASED GLUCOSE;  Start 2/26/19 at 


12:30


Polyethylene Glycol (Miralax) 17 gm BID PO  Last administered on 2/28/19at 


20:40; Admin Dose 17 GM;  Start 2/27/19 at 09:00


Clonidine (Catapres) 0.1 mg Q4H  PRN PO ELEVATED SYSTOLIC BP Last administered 


on 2/28/19at 20:40; Admin Dose 0.1 MG;  Start 2/28/19 at 15:30


Meds reviewed:  Yes





Allergies


Coded Allergies:  


     No Known Allergy (Unverified , 2/26/19)


Allergies Reviewed:  Yes





Labs/Studies


Labs Reviewed:  Reviewed by anesthesiologist


Result Diagram:  


2/26/19 0212                                                                    


           3/1/19 0423





Laboratory Tests


3/1/19 04:23








Pregnancy test:  Negative


Studies:  ECG, CXR





Pre-procedure Exam


Last vitals





Vital Signs


  Date      Temp  Pulse  Resp  B/P (MAP)   Pulse Ox  O2          O2 Flow    FiO2


Time                                                 Delivery    Rate


    3/1/19           61    19      181/79


     11:54                          (113)


    3/1/19  98.5                                 94


     07:44


   2/28/19                                           Room Air


     14:30





Airway:  Adequate mouth opening, Adequate thyromental dist


Mallampati:  Mallampati III


Teeth:  Normal


Lung:  Normal


Heart:  Normal





ASA Physical Status


ASA physical status:  3


Emergency:  None





Planned Anesthetic


General/MAC:  LMA





Planned Pain Management


Parenteral pain med





Pre-operative Attestations


Prior to commencing anesthesia and surgery, the patient was re-evaluated, there 


was verification of:


*The patient's identity


*The results of appropriate recent lab work and preoperative vital signs


*The above evaluation not changing prior to induction


*Anesthetic plan, risk benefits, alternative and complications discussed with p


atient/family; questions answered; patient/family understands, accepts and 


wishes to proceed.











MEGAN COPELAND MD                Mar 1, 2019 12:18

## 2019-03-02 VITALS — RESPIRATION RATE: 16 BRPM | SYSTOLIC BLOOD PRESSURE: 160 MMHG | DIASTOLIC BLOOD PRESSURE: 73 MMHG | HEART RATE: 74 BPM

## 2019-03-02 VITALS — DIASTOLIC BLOOD PRESSURE: 67 MMHG | HEART RATE: 65 BPM | SYSTOLIC BLOOD PRESSURE: 147 MMHG | RESPIRATION RATE: 18 BRPM

## 2019-03-02 VITALS — RESPIRATION RATE: 18 BRPM | HEART RATE: 71 BPM | SYSTOLIC BLOOD PRESSURE: 147 MMHG | DIASTOLIC BLOOD PRESSURE: 72 MMHG

## 2019-03-02 VITALS — RESPIRATION RATE: 18 BRPM | HEART RATE: 81 BPM | SYSTOLIC BLOOD PRESSURE: 122 MMHG | DIASTOLIC BLOOD PRESSURE: 58 MMHG

## 2019-03-02 LAB
ANION GAP: 10 (ref 5–13)
BLOOD UREA NITROGEN: 18 MG/DL (ref 7–20)
CALCIUM: 8.4 MG/DL (ref 8.4–10.2)
CARBON DIOXIDE: 24 MMOL/L (ref 21–31)
CHLORIDE: 109 MMOL/L (ref 97–110)
CREATININE: 1.77 MG/DL (ref 0.44–1)
GLUCOSE: 107 MG/DL (ref 70–220)
POTASSIUM: 4 MMOL/L (ref 3.5–5.1)
SODIUM: 143 MMOL/L (ref 135–144)

## 2019-03-02 RX ADMIN — POLYETHYLENE GLYCOL 3350 1 GM: 17 POWDER, FOR SOLUTION ORAL at 20:29

## 2019-03-02 RX ADMIN — MORPHINE SULFATE 1 MG: 2 INJECTION, SOLUTION INTRAMUSCULAR; INTRAVENOUS at 15:26

## 2019-03-02 RX ADMIN — THIAMINE HYDROCHLORIDE 1 MLS/HR: 100 INJECTION, SOLUTION INTRAMUSCULAR; INTRAVENOUS at 18:33

## 2019-03-02 RX ADMIN — POLYETHYLENE GLYCOL 3350 SCH GM: 17 POWDER, FOR SOLUTION ORAL at 20:29

## 2019-03-02 RX ADMIN — FAMOTIDINE SCH MG: 20 TABLET ORAL at 08:18

## 2019-03-02 RX ADMIN — FOLIC ACID SCH MLS/HR: 5 INJECTION, SOLUTION INTRAMUSCULAR; INTRAVENOUS; SUBCUTANEOUS at 18:33

## 2019-03-02 RX ADMIN — INSULIN ASPART 1 UNIT: 100 INJECTION, SOLUTION INTRAVENOUS; SUBCUTANEOUS at 12:00

## 2019-03-02 RX ADMIN — MORPHINE SULFATE PRN MG: 2 INJECTION, SOLUTION INTRAMUSCULAR; INTRAVENOUS at 05:54

## 2019-03-02 RX ADMIN — INSULIN ASPART 1 UNIT: 100 INJECTION, SOLUTION INTRAVENOUS; SUBCUTANEOUS at 17:15

## 2019-03-02 RX ADMIN — POLYETHYLENE GLYCOL 3350 SCH GM: 17 POWDER, FOR SOLUTION ORAL at 08:18

## 2019-03-02 RX ADMIN — THIAMINE HYDROCHLORIDE 1 MLS/HR: 100 INJECTION, SOLUTION INTRAMUSCULAR; INTRAVENOUS at 04:07

## 2019-03-02 RX ADMIN — FAMOTIDINE 1 MG: 20 TABLET ORAL at 08:18

## 2019-03-02 RX ADMIN — POLYETHYLENE GLYCOL 3350 1 GM: 17 POWDER, FOR SOLUTION ORAL at 08:18

## 2019-03-02 RX ADMIN — FOLIC ACID SCH MLS/HR: 5 INJECTION, SOLUTION INTRAMUSCULAR; INTRAVENOUS; SUBCUTANEOUS at 11:20

## 2019-03-02 RX ADMIN — FOLIC ACID SCH MLS/HR: 5 INJECTION, SOLUTION INTRAMUSCULAR; INTRAVENOUS; SUBCUTANEOUS at 04:07

## 2019-03-02 RX ADMIN — MORPHINE SULFATE PRN MG: 2 INJECTION, SOLUTION INTRAMUSCULAR; INTRAVENOUS at 15:26

## 2019-03-02 RX ADMIN — MORPHINE SULFATE 1 MG: 2 INJECTION, SOLUTION INTRAMUSCULAR; INTRAVENOUS at 05:54

## 2019-03-02 RX ADMIN — INSULIN ASPART 1 UNIT: 100 INJECTION, SOLUTION INTRAVENOUS; SUBCUTANEOUS at 20:29

## 2019-03-02 RX ADMIN — THIAMINE HYDROCHLORIDE 1 MLS/HR: 100 INJECTION, SOLUTION INTRAMUSCULAR; INTRAVENOUS at 11:20

## 2019-03-02 RX ADMIN — INSULIN ASPART 1 UNIT: 100 INJECTION, SOLUTION INTRAVENOUS; SUBCUTANEOUS at 08:00

## 2019-03-02 NOTE — CONS
Consult Date/Type/Reason


Admit Date/Time


Feb 26, 2019 at 10:49


Initial Consult Date


2/28/19


Type of Consultation:  Urology


Reason for Consultation


Bilateral hydronephrosis and urinary retention and acute kidney injury.


Requesting Provider:  JOY RAMIREZ MD


Date/Time of Note


DATE: 3/2/19 


TIME: 14:16





Subjective


Patient complaining of pain in her right flank area.





Objective


Vitals





Vital Signs


  Date      Temp  Pulse  Resp  B/P (MAP)   Pulse Ox  O2          O2 Flow    FiO2


Time                                                 Delivery    Rate


    3/2/19  98.2     65    18      147/67        96  Room Air


     07:34                           (93)


    3/1/19                                                             2.0


     18:24








Intake and Output





3/1/19


3/1/19


3/2/19





1515:00


23:00


07:00





IntakeIntake Total


1375 ml


480 ml


810 ml





OutputOutput Total


2005 ml


1800 ml


1250 ml





BalanceBalance


-630 ml


-1320 ml


-440 ml











Exam


Patient is afebrile and she has right flank tenderness.  The Tidwell catheter is 


draining blood-tinged urine





Results/Medications


Result Diagram:  


2/26/19 0212                                                                    


           3/2/19 0652





Results 24 hrs





Laboratory Tests


Test
                     3/1/19
17:31  3/1/19
21:11  3/2/19
06:52  3/2/19
08:17


Bedside Glucose                  113           112                          95


Sodium Level                                                 143


Potassium Level                                              4.0


Chloride Level                                               109


Carbon Dioxide Level                                          24


Anion Gap                                                     10


Blood Urea Nitrogen                                           18


Creatinine                                                 1.77  H


Est Glomerular Filtrat    
             
                   30  L
  



Rate
mL/min


Glucose Level                                                107


Calcium Level                                                8.4


Test
                     3/2/19
12:12  
             
             



Bedside Glucose                  127





Home Meds


Active Scripts


Hydrocodone/Acetaminophen (Norco 5-325 Tablet) 1 Each Tablet, 1 EACH PO Q4, #20 


TAB


   Prov:JOY RAMIREZ MD         3/1/19


Reported Medications


Tramadol Hcl* (Ultram*) 50 Mg Tablet, 50 MG PO Q6H PRN for PAIN, #15 TAB


   2/26/19


Cephalexin* (Cephalexin*) 500 Mg Capsule, 500 MG PO Q6 for 7 Days, #28 CAP


   2/26/19


Empagliflozin (Jardiance) 10 Mg Tablet, 10 MG PO DAILY, TAB


   12/29/18


Omeprazole* (Omeprazole*) 40 Mg Capsule.dr, 40 MG PO DAILY, #30 CAP


   12/29/18


Carvedilol* (Carvedilol*) 3.125 Mg Tablet, 3.125 MG PO BID, #60 TAB


   12/29/18


Benazepril Hcl* (Benazepril Hcl*) 40 Mg Tablet, 40 MG PO DAILY, #30 TAB


   12/29/18


Discontinued Scripts


Acetaminophen with Codeine (Acetaminophen-Cod #3 Tablet) 1 Each Tablet, 1 TAB PO


Q6H PRN for PAIN, #10 TAB


   Prov:KATI TORRES DO         2/21/19


Medications





Current Medications


Sodium Chloride 1,000 ml @  75 mls/hr [id number] IV  Last administered on 3/2/19at 


04:07; Admin Dose 75 MLS/HR;  Start 2/26/19 at 10:55


IV Flush (NS 3 ml) 3 ml PER PROTOCOL IV ;  Start 2/26/19 at 11:00


IV Flush (NS 3 ml) 3 ml PER PROTOCOL IV ;  Start 2/26/19 at 11:00


Acetaminophen (Tylenol Tab) 650 mg Q6H  PRN PO .PAIN 1-3 OR TEMP;  Start 2/26/19


at 11:00


Morphine Sulfate (morphine) 2 mg Q4H  PRN IV .SEVERE PAIN 7-10 Last administered


on 3/2/19at 05:54; Admin Dose 2 MG;  Start 2/26/19 at 11:00


Docusate Sodium (Colace) 100 mg Q12H  PRN PO .CONSTIPATION Last administered on 


2/27/19at 18:16; Admin Dose 100 MG;  Start 2/26/19 at 11:00


Zolpidem Tartrate (Ambien) 5 mg QHS  PRN PO .INSOMNIA;  Start 2/26/19 at 11:00


Famotidine (Pepcid) 20 mg DAILY PO  Last administered on 3/2/19at 08:18; Admin 


Dose 20 MG;  Start 2/26/19 at 11:30


Carvedilol (Coreg) 3.125 mg BID PO  Last administered on 3/2/19at 08:18; Admin 


Dose 3.125 MG;  Start 2/26/19 at 11:30


Diagnostic Test (Pha) (Accu-Chek) 1 ea 02 XX ;  Start 2/27/19 at 02:00


Insulin Aspart (Novolog Insulin Pen) NOVOLOG *MILD* ALGORITHM WITH MEALS  


BEDTIME SC ;  Start 2/26/19 at 12:00


Miscellaneous Information 1 ea NOTE XX ;  Start 2/26/19 at 12:30


Glucose (Glutose) 15 gm Q15M  PRN PO DECREASED GLUCOSE;  Start 2/26/19 at 12:30


Glucose (Glutose) 22.5 gm Q15M  PRN PO DECREASED GLUCOSE;  Start 2/26/19 at 


12:30


Dextrose (D50w Syringe) 25 ml Q15M  PRN IV DECREASED GLUCOSE;  Start 2/26/19 at 


12:30


Dextrose (D50w Syringe) 50 ml Q15M  PRN IV DECREASED GLUCOSE;  Start 2/26/19 at 


12:30


Glucagon (Glucagen) 1 mg Q15M  PRN IM DECREASED GLUCOSE;  Start 2/26/19 at 12:30


Glucose (Glutose) 15 gm Q15M  PRN BUCCAL DECREASED GLUCOSE;  Start 2/26/19 at 


12:30


Polyethylene Glycol (Miralax) 17 gm BID PO  Last administered on 3/2/19at 08:18;


Admin Dose 17 GM;  Start 2/27/19 at 09:00


Clonidine (Catapres) 0.1 mg Q4H  PRN PO ELEVATED SYSTOLIC BP Last administered 


on 2/28/19at 20:40; Admin Dose 0.1 MG;  Start 2/28/19 at 15:30


Levalbuterol (Xopenex Neb) 1.25 mg PACU ORDER PRN HHN .WHEEZING;  Start 3/1/19 


at 12:30


Acetaminophen/ Hydrocodone Bitart (Norco (5/325)) 1 tab Q4H  PRN PO MODERATE 


PAIN LEVEL 4-6;  Start 3/1/19 at 22:00





Assessment/Plan


Hospital Course (Demo Recall)


58-year-old female presented to the emergency room was pelvic pain.  She has 


been here before and had a CT scan which showed a pelvic mass and bilateral 


hydronephrosis and possible urinary retention.  The pelvic mass is pressing on 


her ureters causing bilateral hydronephrosis.  The patient was seen by Dr. Brown 


and he is planning to operate on her.  Because of the hydronephrosis a 


urological consultation was requested.


On the pelvic exam she does have a large pelvic mass pressing on the bladder.  I


did discuss the findings with her her daughter and her .  She underwent 


cystoscopy and insertion of bilateral ureteral JJ stent on 3/1/2019.  Also 


because of the mass pressing on the bladder and at the bladder neck causing her 


obstruction I inserted a Tidwell catheter and the catheter is draining 


blood-tinged urine.  She does have right flank tenderness.  The distal right 


ureter is very tight and it was very tough to pass the JJ stent up to the 


kidney.  Urologically we will keep the stents in place and would have to replace


them in 3-4 months.











TONIE ALICEA MD             Mar 2, 2019 14:20

## 2019-03-02 NOTE — PN
Date/Time of Note


Date/Time of Note


DATE: 3/2/19 


TIME: 10:37





Subjective


Continues to have lower abdominal pain requiring IV narcotics.





Objective


Vitals





Vital Signs


  Date      Temp  Pulse  Resp  B/P (MAP)   Pulse Ox  O2          O2 Flow    FiO2


Time                                                 Delivery    Rate


    3/2/19  98.2     65    18      147/67        96  Room Air


     07:34                           (93)


    3/1/19                                                             2.0


     18:24








Intake and Output





3/1/19


3/1/19


3/2/19





1515:00


23:00


07:00





IntakeIntake Total


1375 ml


480 ml


810 ml





OutputOutput Total


2005 ml


1800 ml


1250 ml





BalanceBalance


-630 ml


-1320 ml


-440 ml











Lungs are clear to auscultation bilaterally


Cardiac regular rate and rhythm


Abdomen with lower tenderness to palpation


Extremities no edema





Results


Result Diagram:  


2/26/19 0212                                                                    


           3/2/19 0652








Medications


Medications





Current Medications


Sodium Chloride 1,000 ml @  75 mls/hr [id number] IV  Last administered on 3/2/19at 


04:07; Admin Dose 75 MLS/HR;  Start 2/26/19 at 10:55


IV Flush (NS 3 ml) 3 ml PER PROTOCOL IV ;  Start 2/26/19 at 11:00


IV Flush (NS 3 ml) 3 ml PER PROTOCOL IV ;  Start 2/26/19 at 11:00


Acetaminophen (Tylenol Tab) 650 mg Q6H  PRN PO .PAIN 1-3 OR TEMP;  Start 2/26/19


at 11:00


Morphine Sulfate (morphine) 2 mg Q4H  PRN IV .SEVERE PAIN 7-10 Last administered


on 3/2/19at 05:54; Admin Dose 2 MG;  Start 2/26/19 at 11:00


Docusate Sodium (Colace) 100 mg Q12H  PRN PO .CONSTIPATION Last administered on 


2/27/19at 18:16; Admin Dose 100 MG;  Start 2/26/19 at 11:00


Zolpidem Tartrate (Ambien) 5 mg QHS  PRN PO .INSOMNIA;  Start 2/26/19 at 11:00


Famotidine (Pepcid) 20 mg DAILY PO  Last administered on 3/2/19at 08:18; Admin 


Dose 20 MG;  Start 2/26/19 at 11:30


Carvedilol (Coreg) 3.125 mg BID PO  Last administered on 3/2/19at 08:18; Admin 


Dose 3.125 MG;  Start 2/26/19 at 11:30


Diagnostic Test (Pha) (Accu-Chek) 1 ea 02 XX ;  Start 2/27/19 at 02:00


Insulin Aspart (Novolog Insulin Pen) NOVOLOG *MILD* ALGORITHM WITH MEALS  


BEDTIME SC ;  Start 2/26/19 at 12:00


Miscellaneous Information 1 ea NOTE XX ;  Start 2/26/19 at 12:30


Glucose (Glutose) 15 gm Q15M  PRN PO DECREASED GLUCOSE;  Start 2/26/19 at 12:30


Glucose (Glutose) 22.5 gm Q15M  PRN PO DECREASED GLUCOSE;  Start 2/26/19 at 


12:30


Dextrose (D50w Syringe) 25 ml Q15M  PRN IV DECREASED GLUCOSE;  Start 2/26/19 at 


12:30


Dextrose (D50w Syringe) 50 ml Q15M  PRN IV DECREASED GLUCOSE;  Start 2/26/19 at 


12:30


Glucagon (Glucagen) 1 mg Q15M  PRN IM DECREASED GLUCOSE;  Start 2/26/19 at 12:30


Glucose (Glutose) 15 gm Q15M  PRN BUCCAL DECREASED GLUCOSE;  Start 2/26/19 at 


12:30


Polyethylene Glycol (Miralax) 17 gm BID PO  Last administered on 3/2/19at 08:18;


Admin Dose 17 GM;  Start 2/27/19 at 09:00


Clonidine (Catapres) 0.1 mg Q4H  PRN PO ELEVATED SYSTOLIC BP Last administered 


on 2/28/19at 20:40; Admin Dose 0.1 MG;  Start 2/28/19 at 15:30


Levalbuterol (Xopenex Neb) 1.25 mg PACU ORDER PRN HHN .WHEEZING;  Start 3/1/19 


at 12:30


Acetaminophen/ Hydrocodone Bitart (Norco (5/325)) 1 tab Q4H  PRN PO MODERATE 


PAIN LEVEL 4-6;  Start 3/1/19 at 22:00





VTE Prophylaxis


Risk score (from Nsg)>0 risk:  3


SCD applied (from Nsg):  Yes





Lines/Catheters


IV Catheter Type:  Saline Lock


Carbajal in Place:  Yes


Cont'd carbajal catheter reason:  urinary retention





Assessment/Plan


Assessment/Plan


58-year-old female with pelvic mass


Acute kidney injury


Status post bilateral ureteral stent placement


Persistent abdominal pain


History of synchronous uterine and ovarian cancer.  Status post total abdominal 


hysterectomy in 2011





Continue pain control


On-call to the OR on Monday











JOY RAMIREZ MD                   Mar 2, 2019 10:40

## 2019-03-02 NOTE — CONS
Assessment/Plan


Assessment/Plan


Assessment/Plan (Daily)


1. Acute kidney injury due to possible prerenal azotemia + possibely obstruction


from mass


2.  H/o Uterine CA s/p hysterectomy, now concerned about recurrence of tumour 


3. pelvis mass, pssible recurrence 


4. Bilateral Hydronephrosis Cystoscopy and insertion of bilateral ureteral JJ 


stents on 3/1/19 by Dr. Han  


5. H/O HTN


6. H/o DM II





Plan:


Cystoscopy and insertion of bilateral ureteral JJ stents on 3/1/19 by Dr. Han, BUN/Cr improved to 18/1.77


s/p GYN oncology consult - pt will need surgical resection of pelvis mass 


IVF NS at 75 cc/hr


Uric acid 5.8


will follow up





Consultation Date/Type/Reason


Admit Date/Time


Feb 26, 2019 at 10:49


Initial Consult Date


2/26/19


Type of Consult


NEPHROLOGY


Requesting Provider:  JOY RAMIREZ MD


Date/Time of Note


DATE: 3/2/19 


TIME: 12:54





24 HR Interval Summary


Free Text/Dictation


Cystoscopy and insertion of bilateral ureteral JJ stents on 3/1/19 by Urology 


Dr. Han , BUN/Cr 18/1.77





Exam/Review of Systems


Exam


Vitals





Vital Signs


  Date      Temp  Pulse  Resp  B/P (MAP)   Pulse Ox  O2          O2 Flow    FiO2


Time                                                 Delivery    Rate


    3/2/19  98.2     65    18      147/67        96  Room Air


     07:34                           (93)


    3/1/19                                                             2.0


     18:24








Intake and Output





3/1/19


3/1/19


3/2/19





1515:00


23:00


07:00





IntakeIntake Total


1375 ml


480 ml


810 ml





OutputOutput Total


2005 ml


1800 ml


1250 ml





BalanceBalance


-630 ml


-1320 ml


-440 ml











Exam


Constitutional:  alert


Respiratory:  clear to auscultation, normal air movement


Cardiovascular:  regular rate and rhythm, nl pulses


Gastrointestinal:  soft, non-tender


Musculoskeletal:  nl extremities to inspection


Extremities:  normal pulses


Neurological:  CNS II-XII intact, nl mental status, nl speech, nl strength





Results


Result Diagram:  


2/26/19 0212                                                                    


           3/2/19 0652





Results 24hrs





Laboratory Tests


Test
                     3/1/19
17:31  3/1/19
21:11  3/2/19
06:52  3/2/19
08:17


Bedside Glucose                  113           112                          95


Sodium Level                                                 143


Potassium Level                                              4.0


Chloride Level                                               109


Carbon Dioxide Level                                          24


Anion Gap                                                     10


Blood Urea Nitrogen                                           18


Creatinine                                                 1.77  H


Est Glomerular Filtrat    
             
                   30  L
  



Rate
mL/min


Glucose Level                                                107


Calcium Level                                                8.4


Test
                     3/2/19
12:12  
             
             



Bedside Glucose                  127








Medications


Medication





Current Medications


Sodium Chloride 1,000 ml @  75 mls/hr [id number] IV  Last administered on 3/2/19at 


04:07; Admin Dose 75 MLS/HR;  Start 2/26/19 at 10:55


IV Flush (NS 3 ml) 3 ml PER PROTOCOL IV ;  Start 2/26/19 at 11:00


IV Flush (NS 3 ml) 3 ml PER PROTOCOL IV ;  Start 2/26/19 at 11:00


Acetaminophen (Tylenol Tab) 650 mg Q6H  PRN PO .PAIN 1-3 OR TEMP;  Start 2/26/19


at 11:00


Morphine Sulfate (morphine) 2 mg Q4H  PRN IV .SEVERE PAIN 7-10 Last administered


on 3/2/19at 05:54; Admin Dose 2 MG;  Start 2/26/19 at 11:00


Docusate Sodium (Colace) 100 mg Q12H  PRN PO .CONSTIPATION Last administered on 


2/27/19at 18:16; Admin Dose 100 MG;  Start 2/26/19 at 11:00


Zolpidem Tartrate (Ambien) 5 mg QHS  PRN PO .INSOMNIA;  Start 2/26/19 at 11:00


Famotidine (Pepcid) 20 mg DAILY PO  Last administered on 3/2/19at 08:18; Admin 


Dose 20 MG;  Start 2/26/19 at 11:30


Carvedilol (Coreg) 3.125 mg BID PO  Last administered on 3/2/19at 08:18; Admin 


Dose 3.125 MG;  Start 2/26/19 at 11:30


Diagnostic Test (Pha) (Accu-Chek) 1 ea 02 XX ;  Start 2/27/19 at 02:00


Insulin Aspart (Novolog Insulin Pen) NOVOLOG *MILD* ALGORITHM WITH MEALS  


BEDTIME SC ;  Start 2/26/19 at 12:00


Miscellaneous Information 1 ea NOTE XX ;  Start 2/26/19 at 12:30


Glucose (Glutose) 15 gm Q15M  PRN PO DECREASED GLUCOSE;  Start 2/26/19 at 12:30


Glucose (Glutose) 22.5 gm Q15M  PRN PO DECREASED GLUCOSE;  Start 2/26/19 at 


12:30


Dextrose (D50w Syringe) 25 ml Q15M  PRN IV DECREASED GLUCOSE;  Start 2/26/19 at 


12:30


Dextrose (D50w Syringe) 50 ml Q15M  PRN IV DECREASED GLUCOSE;  Start 2/26/19 at 


12:30


Glucagon (Glucagen) 1 mg Q15M  PRN IM DECREASED GLUCOSE;  Start 2/26/19 at 12:30


Glucose (Glutose) 15 gm Q15M  PRN BUCCAL DECREASED GLUCOSE;  Start 2/26/19 at 


12:30


Polyethylene Glycol (Miralax) 17 gm BID PO  Last administered on 3/2/19at 08:18;


Admin Dose 17 GM;  Start 2/27/19 at 09:00


Clonidine (Catapres) 0.1 mg Q4H  PRN PO ELEVATED SYSTOLIC BP Last administered 


on 2/28/19at 20:40; Admin Dose 0.1 MG;  Start 2/28/19 at 15:30


Levalbuterol (Xopenex Neb) 1.25 mg PACU ORDER PRN HHN .WHEEZING;  Start 3/1/19 


at 12:30


Acetaminophen/ Hydrocodone Bitart (Norco (5/325)) 1 tab Q4H  PRN PO MODERATE 


PAIN LEVEL 4-6;  Start 3/1/19 at 22:00











MARGIE GODINEZ MD            Mar 2, 2019 12:54

## 2019-03-03 VITALS — HEART RATE: 79 BPM | RESPIRATION RATE: 18 BRPM | SYSTOLIC BLOOD PRESSURE: 133 MMHG | DIASTOLIC BLOOD PRESSURE: 63 MMHG

## 2019-03-03 VITALS — SYSTOLIC BLOOD PRESSURE: 111 MMHG | RESPIRATION RATE: 18 BRPM | DIASTOLIC BLOOD PRESSURE: 53 MMHG | HEART RATE: 77 BPM

## 2019-03-03 VITALS — RESPIRATION RATE: 16 BRPM | SYSTOLIC BLOOD PRESSURE: 132 MMHG | HEART RATE: 77 BPM | DIASTOLIC BLOOD PRESSURE: 63 MMHG

## 2019-03-03 VITALS — DIASTOLIC BLOOD PRESSURE: 60 MMHG | SYSTOLIC BLOOD PRESSURE: 123 MMHG | HEART RATE: 73 BPM | RESPIRATION RATE: 16 BRPM

## 2019-03-03 RX ADMIN — MAGESIUM CITRATE 1 ML: 1.75 LIQUID ORAL at 13:25

## 2019-03-03 RX ADMIN — THIAMINE HYDROCHLORIDE 1 MLS/HR: 100 INJECTION, SOLUTION INTRAMUSCULAR; INTRAVENOUS at 22:30

## 2019-03-03 RX ADMIN — HYDROCODONE BITARTRATE AND ACETAMINOPHEN PRN TAB: 5; 325 TABLET ORAL at 12:24

## 2019-03-03 RX ADMIN — MORPHINE SULFATE PRN MG: 2 INJECTION, SOLUTION INTRAMUSCULAR; INTRAVENOUS at 17:02

## 2019-03-03 RX ADMIN — INSULIN ASPART 1 UNIT: 100 INJECTION, SOLUTION INTRAVENOUS; SUBCUTANEOUS at 17:30

## 2019-03-03 RX ADMIN — POLYETHYLENE GLYCOL 3350 1 GM: 17 POWDER, FOR SOLUTION ORAL at 20:44

## 2019-03-03 RX ADMIN — POLYETHYLENE GLYCOL 3350 SCH GM: 17 POWDER, FOR SOLUTION ORAL at 20:44

## 2019-03-03 RX ADMIN — MORPHINE SULFATE PRN MG: 2 INJECTION, SOLUTION INTRAMUSCULAR; INTRAVENOUS at 01:09

## 2019-03-03 RX ADMIN — INSULIN ASPART 1 UNIT: 100 INJECTION, SOLUTION INTRAVENOUS; SUBCUTANEOUS at 12:00

## 2019-03-03 RX ADMIN — THIAMINE HYDROCHLORIDE 1 MLS/HR: 100 INJECTION, SOLUTION INTRAMUSCULAR; INTRAVENOUS at 08:30

## 2019-03-03 RX ADMIN — HYDROCODONE BITARTRATE AND ACETAMINOPHEN 1 TAB: 5; 325 TABLET ORAL at 12:24

## 2019-03-03 RX ADMIN — MORPHINE SULFATE PRN MG: 2 INJECTION, SOLUTION INTRAMUSCULAR; INTRAVENOUS at 10:37

## 2019-03-03 RX ADMIN — FAMOTIDINE 1 MG: 20 TABLET ORAL at 08:27

## 2019-03-03 RX ADMIN — POLYETHYLENE GLYCOL 3350 SCH GM: 17 POWDER, FOR SOLUTION ORAL at 08:28

## 2019-03-03 RX ADMIN — INSULIN ASPART 1 UNIT: 100 INJECTION, SOLUTION INTRAVENOUS; SUBCUTANEOUS at 20:42

## 2019-03-03 RX ADMIN — MORPHINE SULFATE 1 MG: 2 INJECTION, SOLUTION INTRAMUSCULAR; INTRAVENOUS at 01:09

## 2019-03-03 RX ADMIN — POLYETHYLENE GLYCOL 3350 1 GM: 17 POWDER, FOR SOLUTION ORAL at 08:28

## 2019-03-03 RX ADMIN — INSULIN ASPART 1 UNIT: 100 INJECTION, SOLUTION INTRAVENOUS; SUBCUTANEOUS at 08:00

## 2019-03-03 RX ADMIN — ONDANSETRON HYDROCHLORIDE 1 MG: 2 INJECTION, SOLUTION INTRAMUSCULAR; INTRAVENOUS at 16:58

## 2019-03-03 RX ADMIN — FOLIC ACID SCH MLS/HR: 5 INJECTION, SOLUTION INTRAMUSCULAR; INTRAVENOUS; SUBCUTANEOUS at 22:30

## 2019-03-03 RX ADMIN — MORPHINE SULFATE 1 MG: 2 INJECTION, SOLUTION INTRAMUSCULAR; INTRAVENOUS at 10:37

## 2019-03-03 RX ADMIN — FOLIC ACID SCH MLS/HR: 5 INJECTION, SOLUTION INTRAMUSCULAR; INTRAVENOUS; SUBCUTANEOUS at 08:30

## 2019-03-03 RX ADMIN — FAMOTIDINE SCH MG: 20 TABLET ORAL at 08:27

## 2019-03-03 RX ADMIN — MORPHINE SULFATE 1 MG: 2 INJECTION, SOLUTION INTRAMUSCULAR; INTRAVENOUS at 17:02

## 2019-03-03 NOTE — PN
Date/Time of Note


Date/Time of Note


DATE: 3/3/19 


TIME: 12:21





Assessment/Plan


VTE Prophylaxis


Risk score (from Nsg)>0 risk:  3


SCD applied (from Nsg):  Yes


Pharmacological prophylaxis:  other





Lines/Catheters


IV Catheter Type (from Nrsg):  Saline Lock


Urinary Cath still in place:  Yes


Reason Cath still needed:  urinary retention





Assessment/Plan


Assessment/Plan


Mag citrate today.  Will plan on ex lap, resection of pelvic mass tomorrow end 


of the day.  NPO after breakfast tomorrow morning.


Result Diagram:  


3/2/19 0652





Results 24hrs





Laboratory Tests


           Test
            3/2/19
17:14  3/2/19
20:26  3/3/19
08:27


           Bedside Glucose          96           107            96








Subjective


24 Hr Interval Summary


Free Text/Dictation


C/O pain, s/p stents placement





Exam/Review of Systems


Exam


Vitals





Vital Signs


  Date      Temp  Pulse  Resp  B/P (MAP)   Pulse Ox  O2          O2 Flow    FiO2


Time                                                 Delivery    Rate


    3/3/19  98.3     73    16      123/60        92  Room Air


     07:49                           (81)


    3/1/19                                                             2.0


     18:24








Intake and Output





3/2/19


3/2/19


3/3/19





1515:00


23:00


07:00





IntakeIntake Total


1415 ml


900 ml





OutputOutput Total


900 ml


1750 ml





BalanceBalance


515 ml


-850 ml











Gastrointestinal:  soft





Results


Results 24hrs





Laboratory Tests


           Test
            3/2/19
17:14  3/2/19
20:26  3/3/19
08:27


           Bedside Glucose          96           107            96








Medications


Medication





Current Medications


Sodium Chloride 1,000 ml @  75 mls/hr [id number] IV  Last administered on 3/3/19at 


08:30; Admin Dose 75 MLS/HR;  Start 2/26/19 at 10:55


IV Flush (NS 3 ml) 3 ml PER PROTOCOL IV ;  Start 2/26/19 at 11:00


IV Flush (NS 3 ml) 3 ml PER PROTOCOL IV ;  Start 2/26/19 at 11:00


Acetaminophen (Tylenol Tab) 650 mg Q6H  PRN PO .PAIN 1-3 OR TEMP;  Start 2/26/19


at 11:00


Morphine Sulfate (morphine) 2 mg Q4H  PRN IV .SEVERE PAIN 7-10 Last administered


on 3/3/19at 10:37; Admin Dose 2 MG;  Start 2/26/19 at 11:00


Docusate Sodium (Colace) 100 mg Q12H  PRN PO .CONSTIPATION Last administered on 


2/27/19at 18:16; Admin Dose 100 MG;  Start 2/26/19 at 11:00


Zolpidem Tartrate (Ambien) 5 mg QHS  PRN PO .INSOMNIA;  Start 2/26/19 at 11:00


Famotidine (Pepcid) 20 mg DAILY PO  Last administered on 3/3/19at 08:27; Admin 


Dose 20 MG;  Start 2/26/19 at 11:30


Carvedilol (Coreg) 3.125 mg BID PO  Last administered on 3/3/19at 08:28; Admin 


Dose 3.125 MG;  Start 2/26/19 at 11:30


Diagnostic Test (Pha) (Accu-Chek) 1 ea 02 XX ;  Start 2/27/19 at 02:00


Insulin Aspart (Novolog Insulin Pen) NOVOLOG *MILD* ALGORITHM WITH MEALS  


BEDTIME SC ;  Start 2/26/19 at 12:00


Miscellaneous Information 1 ea NOTE XX ;  Start 2/26/19 at 12:30


Glucose (Glutose) 15 gm Q15M  PRN PO DECREASED GLUCOSE;  Start 2/26/19 at 12:30


Glucose (Glutose) 22.5 gm Q15M  PRN PO DECREASED GLUCOSE;  Start 2/26/19 at 


12:30


Dextrose (D50w Syringe) 25 ml Q15M  PRN IV DECREASED GLUCOSE;  Start 2/26/19 at 


12:30


Dextrose (D50w Syringe) 50 ml Q15M  PRN IV DECREASED GLUCOSE;  Start 2/26/19 at 


12:30


Glucagon (Glucagen) 1 mg Q15M  PRN IM DECREASED GLUCOSE;  Start 2/26/19 at 12:30


Glucose (Glutose) 15 gm Q15M  PRN BUCCAL DECREASED GLUCOSE;  Start 2/26/19 at 


12:30


Polyethylene Glycol (Miralax) 17 gm BID PO  Last administered on 3/3/19at 08:28;


Admin Dose 17 GM;  Start 2/27/19 at 09:00


Clonidine (Catapres) 0.1 mg Q4H  PRN PO ELEVATED SYSTOLIC BP Last administered 


on 2/28/19at 20:40; Admin Dose 0.1 MG;  Start 2/28/19 at 15:30


Levalbuterol (Xopenex Neb) 1.25 mg PACU ORDER PRN HHN .WHEEZING;  Start 3/1/19 


at 12:30


Acetaminophen/ Hydrocodone Bitart (Norco (5/325)) 1 tab Q4H  PRN PO MODERATE 


PAIN LEVEL 4-6;  Start 3/1/19 at 22:00











KRISH GUAJARDO MD                Mar 3, 2019 12:22

## 2019-03-03 NOTE — PN
Date/Time of Note


Date/Time of Note


DATE: 3/3/19 


TIME: 10:37





Subjective


Doing well.  Reports less abdominal pain.  No nausea or vomiting.





Objective


Vitals





Vital Signs


  Date      Temp  Pulse  Resp  B/P (MAP)   Pulse Ox  O2          O2 Flow    FiO2


Time                                                 Delivery    Rate


    3/3/19  98.3     73    16      123/60        92  Room Air


     07:49                           (81)


    3/1/19                                                             2.0


     18:24








Intake and Output





3/2/19


3/2/19


3/3/19





1414:59


22:59


06:59





IntakeIntake Total


1415 ml


900 ml





OutputOutput Total


900 ml


1750 ml





BalanceBalance


515 ml


-850 ml











Lungs clear to auscultation bilaterally


Cardiac regular rate and rhythm


Abdomen with lower tenderness to palpation.  No rebound or guarding.


No clubbing cyanosis or edema


Neurological nonfocal





Results


Result Diagram:  


3/2/19 0652








Medications


Medications





Current Medications


Sodium Chloride 1,000 ml @  75 mls/hr [id number] IV  Last administered on 3/3/19at 


08:30; Admin Dose 75 MLS/HR;  Start 2/26/19 at 10:55


IV Flush (NS 3 ml) 3 ml PER PROTOCOL IV ;  Start 2/26/19 at 11:00


IV Flush (NS 3 ml) 3 ml PER PROTOCOL IV ;  Start 2/26/19 at 11:00


Acetaminophen (Tylenol Tab) 650 mg Q6H  PRN PO .PAIN 1-3 OR TEMP;  Start 2/26/19


at 11:00


Morphine Sulfate (morphine) 2 mg Q4H  PRN IV .SEVERE PAIN 7-10 Last administered


on 3/3/19at 01:09; Admin Dose 2 MG;  Start 2/26/19 at 11:00


Docusate Sodium (Colace) 100 mg Q12H  PRN PO .CONSTIPATION Last administered on 


2/27/19at 18:16; Admin Dose 100 MG;  Start 2/26/19 at 11:00


Zolpidem Tartrate (Ambien) 5 mg QHS  PRN PO .INSOMNIA;  Start 2/26/19 at 11:00


Famotidine (Pepcid) 20 mg DAILY PO  Last administered on 3/3/19at 08:27; Admin 


Dose 20 MG;  Start 2/26/19 at 11:30


Carvedilol (Coreg) 3.125 mg BID PO  Last administered on 3/3/19at 08:28; Admin D


ose 3.125 MG;  Start 2/26/19 at 11:30


Diagnostic Test (Pha) (Accu-Chek) 1 ea 02 XX ;  Start 2/27/19 at 02:00


Insulin Aspart (Novolog Insulin Pen) NOVOLOG *MILD* ALGORITHM WITH MEALS  


BEDTIME SC ;  Start 2/26/19 at 12:00


Miscellaneous Information 1 ea NOTE XX ;  Start 2/26/19 at 12:30


Glucose (Glutose) 15 gm Q15M  PRN PO DECREASED GLUCOSE;  Start 2/26/19 at 12:30


Glucose (Glutose) 22.5 gm Q15M  PRN PO DECREASED GLUCOSE;  Start 2/26/19 at 


12:30


Dextrose (D50w Syringe) 25 ml Q15M  PRN IV DECREASED GLUCOSE;  Start 2/26/19 at 


12:30


Dextrose (D50w Syringe) 50 ml Q15M  PRN IV DECREASED GLUCOSE;  Start 2/26/19 at 


12:30


Glucagon (Glucagen) 1 mg Q15M  PRN IM DECREASED GLUCOSE;  Start 2/26/19 at 12:30


Glucose (Glutose) 15 gm Q15M  PRN BUCCAL DECREASED GLUCOSE;  Start 2/26/19 at 


12:30


Polyethylene Glycol (Miralax) 17 gm BID PO  Last administered on 3/3/19at 08:28;


Admin Dose 17 GM;  Start 2/27/19 at 09:00


Clonidine (Catapres) 0.1 mg Q4H  PRN PO ELEVATED SYSTOLIC BP Last administered 


on 2/28/19at 20:40; Admin Dose 0.1 MG;  Start 2/28/19 at 15:30


Levalbuterol (Xopenex Neb) 1.25 mg PACU ORDER PRN HHN .WHEEZING;  Start 3/1/19 


at 12:30


Acetaminophen/ Hydrocodone Bitart (Norco (5/325)) 1 tab Q4H  PRN PO MODERATE 


PAIN LEVEL 4-6;  Start 3/1/19 at 22:00





VTE Prophylaxis


Risk score (from Nsg)>0 risk:  2


SCD applied (from Nsg):  Yes





Lines/Catheters


IV Catheter Type:  Saline Lock


Carbajal in Place:  Yes


Cont'd carbajal catheter reason:  other (indicate)





Assessment/Plan


Assessment/Plan


58-year-old with pelvic mass


Acute kidney injury


Status post bilateral ureteral stent placement


History of synchronous uterine and ovarian cancer


Status post total abdominal hysterectomy in 2011





N.p.o. after breakfast tomorrow


On-call to operating room on Monday evening


Case was discussed with Dr. Brown


Plan of care was discussed with the patient and her daughter











JAMES JOY GRECO                   Mar 3, 2019 10:39

## 2019-03-03 NOTE — CONS
Assessment/Plan


Assessment/Plan


Assessment/Plan (Daily)


1. Acute kidney injury due to possible prerenal azotemia + possibely obstruction


from mass


2.  H/o Uterine CA s/p hysterectomy, now concerned about recurrence of tumour 


3. pelvis mass, pssible recurrence 


4. Bilateral Hydronephrosis Cystoscopy and insertion of bilateral ureteral JJ 


stents on 3/1/19 by Dr. Han  


5. H/O HTN


6. H/o DM II





Plan:


Cystoscopy and insertion of bilateral ureteral JJ stents on 3/1/19 by Dr. Han, BUN/Cr improved to 18/1.77- no labs today to review yet 


s/p GYN oncology consult - pt will need surgical resection of pelvis mass 


IVF NS at 75 cc/hr


Uric acid 5.8


will follow up





Consultation Date/Type/Reason


Admit Date/Time


Feb 26, 2019 at 10:49


Initial Consult Date


2/26/19


Type of Consult


NEPHROLOGY


Requesting Provider:  JOY RAMIREZ MD


Date/Time of Note


DATE: 3/3/19 


TIME: 10:02





Exam/Review of Systems


Exam


Vitals





Vital Signs


  Date      Temp  Pulse  Resp  B/P (MAP)   Pulse Ox  O2          O2 Flow    FiO2


Time                                                 Delivery    Rate


    3/3/19  98.3     73    16      123/60        92  Room Air


     07:49                           (81)


    3/1/19                                                             2.0


     18:24








Intake and Output





3/2/19


3/2/19


3/3/19





1515:00


23:00


07:00





IntakeIntake Total


1415 ml


900 ml





OutputOutput Total


900 ml


1750 ml





BalanceBalance


515 ml


-850 ml











Exam


Constitutional:  alert


Respiratory:  clear to auscultation, normal air movement


Cardiovascular:  regular rate and rhythm, nl pulses


Gastrointestinal:  soft, non-tender


Musculoskeletal:  nl extremities to inspection


Extremities:  normal pulses


Neurological:  CNS II-XII intact, nl mental status, nl speech, nl strength





Results


Result Diagram:  


3/2/19 0652





Results 24hrs





Laboratory Tests


    Test
            3/2/19
12:12  3/2/19
17:14  3/2/19
20:26  3/3/19
08:27


    Bedside Glucose         127            96           107            96








Medications


Medication





Current Medications


Sodium Chloride 1,000 ml @  75 mls/hr [id number] IV  Last administered on 3/3/19at 


08:30; Admin Dose 75 MLS/HR;  Start 2/26/19 at 10:55


IV Flush (NS 3 ml) 3 ml PER PROTOCOL IV ;  Start 2/26/19 at 11:00


IV Flush (NS 3 ml) 3 ml PER PROTOCOL IV ;  Start 2/26/19 at 11:00


Acetaminophen (Tylenol Tab) 650 mg Q6H  PRN PO .PAIN 1-3 OR TEMP;  Start 2/26/19


at 11:00


Morphine Sulfate (morphine) 2 mg Q4H  PRN IV .SEVERE PAIN 7-10 Last administered


on 3/3/19at 01:09; Admin Dose 2 MG;  Start 2/26/19 at 11:00


Docusate Sodium (Colace) 100 mg Q12H  PRN PO .CONSTIPATION Last administered on 


2/27/19at 18:16; Admin Dose 100 MG;  Start 2/26/19 at 11:00


Zolpidem Tartrate (Ambien) 5 mg QHS  PRN PO .INSOMNIA;  Start 2/26/19 at 11:00


Famotidine (Pepcid) 20 mg DAILY PO  Last administered on 3/3/19at 08:27; Admin 


Dose 20 MG;  Start 2/26/19 at 11:30


Carvedilol (Coreg) 3.125 mg BID PO  Last administered on 3/3/19at 08:28; Admin 


Dose 3.125 MG;  Start 2/26/19 at 11:30


Diagnostic Test (Pha) (Accu-Chek) 1 ea 02 XX ;  Start 2/27/19 at 02:00


Insulin Aspart (Novolog Insulin Pen) NOVOLOG *MILD* ALGORITHM WITH MEALS  


BEDTIME SC ;  Start 2/26/19 at 12:00


Miscellaneous Information 1 ea NOTE XX ;  Start 2/26/19 at 12:30


Glucose (Glutose) 15 gm Q15M  PRN PO DECREASED GLUCOSE;  Start 2/26/19 at 12:30


Glucose (Glutose) 22.5 gm Q15M  PRN PO DECREASED GLUCOSE;  Start 2/26/19 at 


12:30


Dextrose (D50w Syringe) 25 ml Q15M  PRN IV DECREASED GLUCOSE;  Start 2/26/19 at 


12:30


Dextrose (D50w Syringe) 50 ml Q15M  PRN IV DECREASED GLUCOSE;  Start 2/26/19 at 


12:30


Glucagon (Glucagen) 1 mg Q15M  PRN IM DECREASED GLUCOSE;  Start 2/26/19 at 12:30


Glucose (Glutose) 15 gm Q15M  PRN BUCCAL DECREASED GLUCOSE;  Start 2/26/19 at 


12:30


Polyethylene Glycol (Miralax) 17 gm BID PO  Last administered on 3/3/19at 08:28;


Admin Dose 17 GM;  Start 2/27/19 at 09:00


Clonidine (Catapres) 0.1 mg Q4H  PRN PO ELEVATED SYSTOLIC BP Last administered 


on 2/28/19at 20:40; Admin Dose 0.1 MG;  Start 2/28/19 at 15:30


Levalbuterol (Xopenex Neb) 1.25 mg PACU ORDER PRN HHN .WHEEZING;  Start 3/1/19 


at 12:30


Acetaminophen/ Hydrocodone Bitart (Norco (5/325)) 1 tab Q4H  PRN PO MODERATE 


PAIN LEVEL 4-6;  Start 3/1/19 at 22:00











MARGIE GODINEZ MD            Mar 3, 2019 10:02

## 2019-03-03 NOTE — CONS
Consult Date/Type/Reason


Admit Date/Time


Feb 26, 2019 at 10:49


Initial Consult Date


2/28/19


Type of Consultation:  Urology


Reason for Consultation


Bilateral hydronephrosis secondary to compression of the ureters by a pelvic 


mass


Requesting Provider:  JOY RAMIREZ MD


Date/Time of Note


DATE: 3/3/19 


TIME: 11:29





Subjective


Patient continues to complain of pain in the lower abdomen and right flank area.





Objective


Vitals





Vital Signs


  Date      Temp  Pulse  Resp  B/P (MAP)   Pulse Ox  O2          O2 Flow    FiO2


Time                                                 Delivery    Rate


    3/3/19  98.3     73    16      123/60        92  Room Air


     07:49                           (81)


    3/1/19                                                             2.0


     18:24








Intake and Output





3/2/19


3/2/19


3/3/19





1515:00


23:00


07:00





IntakeIntake Total


1415 ml


900 ml





OutputOutput Total


900 ml


1750 ml





BalanceBalance


515 ml


-850 ml











Exam


Suprapubic tenderness and large pelvic mass.





Results/Medications


Result Diagram:  


3/2/19 0652





Results 24 hrs





Laboratory Tests


    Test
            3/2/19
12:12  3/2/19
17:14  3/2/19
20:26  3/3/19
08:27


    Bedside Glucose         127            96           107            96





Home Meds


Active Scripts


Hydrocodone/Acetaminophen (Norco 5-325 Tablet) 1 Each Tablet, 1 EACH PO Q4, #20 


TAB


   Prov:JOY RAMIREZ MD         3/1/19


Reported Medications


Tramadol Hcl* (Ultram*) 50 Mg Tablet, 50 MG PO Q6H PRN for PAIN, #15 TAB


   2/26/19


Cephalexin* (Cephalexin*) 500 Mg Capsule, 500 MG PO Q6 for 7 Days, #28 CAP


   2/26/19


Empagliflozin (Jardiance) 10 Mg Tablet, 10 MG PO DAILY, TAB


   12/29/18


Omeprazole* (Omeprazole*) 40 Mg Capsule.dr, 40 MG PO DAILY, #30 CAP


   12/29/18


Carvedilol* (Carvedilol*) 3.125 Mg Tablet, 3.125 MG PO BID, #60 TAB


   12/29/18


Benazepril Hcl* (Benazepril Hcl*) 40 Mg Tablet, 40 MG PO DAILY, #30 TAB


   12/29/18


Discontinued Scripts


Acetaminophen with Codeine (Acetaminophen-Cod #3 Tablet) 1 Each Tablet, 1 TAB PO


Q6H PRN for PAIN, #10 TAB


   Prov:KATI TORRES DO         2/21/19


Medications





Current Medications


Sodium Chloride 1,000 ml @  75 mls/hr [id number] IV  Last administered on 3/3/19at 


08:30; Admin Dose 75 MLS/HR;  Start 2/26/19 at 10:55


IV Flush (NS 3 ml) 3 ml PER PROTOCOL IV ;  Start 2/26/19 at 11:00


IV Flush (NS 3 ml) 3 ml PER PROTOCOL IV ;  Start 2/26/19 at 11:00


Acetaminophen (Tylenol Tab) 650 mg Q6H  PRN PO .PAIN 1-3 OR TEMP;  Start 2/26/19


at 11:00


Morphine Sulfate (morphine) 2 mg Q4H  PRN IV .SEVERE PAIN 7-10 Last administered


on 3/3/19at 10:37; Admin Dose 2 MG;  Start 2/26/19 at 11:00


Docusate Sodium (Colace) 100 mg Q12H  PRN PO .CONSTIPATION Last administered on 


2/27/19at 18:16; Admin Dose 100 MG;  Start 2/26/19 at 11:00


Zolpidem Tartrate (Ambien) 5 mg QHS  PRN PO .INSOMNIA;  Start 2/26/19 at 11:00


Famotidine (Pepcid) 20 mg DAILY PO  Last administered on 3/3/19at 08:27; Admin 


Dose 20 MG;  Start 2/26/19 at 11:30


Carvedilol (Coreg) 3.125 mg BID PO  Last administered on 3/3/19at 08:28; Admin 


Dose 3.125 MG;  Start 2/26/19 at 11:30


Diagnostic Test (Pha) (Accu-Chek) 1 ea 02 XX ;  Start 2/27/19 at 02:00


Insulin Aspart (Novolog Insulin Pen) NOVOLOG *MILD* ALGORITHM WITH MEALS  


BEDTIME SC ;  Start 2/26/19 at 12:00


Miscellaneous Information 1 ea NOTE XX ;  Start 2/26/19 at 12:30


Glucose (Glutose) 15 gm Q15M  PRN PO DECREASED GLUCOSE;  Start 2/26/19 at 12:30


Glucose (Glutose) 22.5 gm Q15M  PRN PO DECREASED GLUCOSE;  Start 2/26/19 at 


12:30


Dextrose (D50w Syringe) 25 ml Q15M  PRN IV DECREASED GLUCOSE;  Start 2/26/19 at 


12:30


Dextrose (D50w Syringe) 50 ml Q15M  PRN IV DECREASED GLUCOSE;  Start 2/26/19 at 


12:30


Glucagon (Glucagen) 1 mg Q15M  PRN IM DECREASED GLUCOSE;  Start 2/26/19 at 12:30


Glucose (Glutose) 15 gm Q15M  PRN BUCCAL DECREASED GLUCOSE;  Start 2/26/19 at 


12:30


Polyethylene Glycol (Miralax) 17 gm BID PO  Last administered on 3/3/19at 08:28;


Admin Dose 17 GM;  Start 2/27/19 at 09:00


Clonidine (Catapres) 0.1 mg Q4H  PRN PO ELEVATED SYSTOLIC BP Last administered 


on 2/28/19at 20:40; Admin Dose 0.1 MG;  Start 2/28/19 at 15:30


Levalbuterol (Xopenex Neb) 1.25 mg PACU ORDER PRN HHN .WHEEZING;  Start 3/1/19 


at 12:30


Acetaminophen/ Hydrocodone Bitart (Norco (5/325)) 1 tab Q4H  PRN PO MODERATE 


PAIN LEVEL 4-6;  Start 3/1/19 at 22:00





Assessment/Plan


Hospital Course (Demo Recall)


58-year-old female presented to the emergency room was pelvic pain.  She has 


been here before and had a CT scan which showed a pelvic mass and bilateral 


hydronephrosis and possible urinary retention.  The pelvic mass is pressing on 


her ureters causing bilateral hydronephrosis.  The patient was seen by Dr. Brown 


and he is planning to operate on her.  Because of the hydronephrosis a urolog


ical consultation was requested.


On the pelvic exam she does have a large pelvic mass pressing on the bladder.  I


did discuss the findings with her her daughter and her .  She underwent 


cystoscopy and insertion of bilateral ureteral JJ stent on 3/1/2019.  Also 


because of the mass pressing on the bladder and at the bladder neck causing her 


obstruction I inserted a Tidwell catheter and the catheter continues to drain 


blood-tinged urine.  She does have right flank tenderness.  Patient is going to 


have surgery tomorrow after 4 PM as per family.  I did show the retrograde and 


area of narrowing in her ureters to her daughter.  I explained to her that she 


may need to have the stents after the pelvic surgery and later on we could do 


cystoscopy remove the stents and do retrograde pyelograms again.











TONIE ALICEA MD             Mar 3, 2019 11:34

## 2019-03-04 VITALS — HEART RATE: 66 BPM | RESPIRATION RATE: 23 BRPM | SYSTOLIC BLOOD PRESSURE: 92 MMHG | DIASTOLIC BLOOD PRESSURE: 47 MMHG

## 2019-03-04 VITALS — HEART RATE: 62 BPM | RESPIRATION RATE: 18 BRPM | SYSTOLIC BLOOD PRESSURE: 148 MMHG | DIASTOLIC BLOOD PRESSURE: 70 MMHG

## 2019-03-04 VITALS — DIASTOLIC BLOOD PRESSURE: 52 MMHG | RESPIRATION RATE: 22 BRPM | HEART RATE: 58 BPM | SYSTOLIC BLOOD PRESSURE: 97 MMHG

## 2019-03-04 VITALS — HEART RATE: 58 BPM | SYSTOLIC BLOOD PRESSURE: 89 MMHG | DIASTOLIC BLOOD PRESSURE: 43 MMHG | RESPIRATION RATE: 24 BRPM

## 2019-03-04 VITALS — HEART RATE: 58 BPM | SYSTOLIC BLOOD PRESSURE: 112 MMHG | DIASTOLIC BLOOD PRESSURE: 57 MMHG | RESPIRATION RATE: 19 BRPM

## 2019-03-04 VITALS — DIASTOLIC BLOOD PRESSURE: 55 MMHG | HEART RATE: 63 BPM | SYSTOLIC BLOOD PRESSURE: 95 MMHG | RESPIRATION RATE: 20 BRPM

## 2019-03-04 VITALS — DIASTOLIC BLOOD PRESSURE: 68 MMHG | HEART RATE: 70 BPM | RESPIRATION RATE: 18 BRPM | SYSTOLIC BLOOD PRESSURE: 149 MMHG

## 2019-03-04 VITALS — DIASTOLIC BLOOD PRESSURE: 65 MMHG | RESPIRATION RATE: 18 BRPM | HEART RATE: 83 BPM | SYSTOLIC BLOOD PRESSURE: 137 MMHG

## 2019-03-04 VITALS — SYSTOLIC BLOOD PRESSURE: 97 MMHG | DIASTOLIC BLOOD PRESSURE: 48 MMHG | RESPIRATION RATE: 23 BRPM | HEART RATE: 56 BPM

## 2019-03-04 VITALS — DIASTOLIC BLOOD PRESSURE: 47 MMHG | RESPIRATION RATE: 25 BRPM | SYSTOLIC BLOOD PRESSURE: 90 MMHG | HEART RATE: 60 BPM

## 2019-03-04 VITALS — DIASTOLIC BLOOD PRESSURE: 47 MMHG | HEART RATE: 64 BPM | RESPIRATION RATE: 24 BRPM | SYSTOLIC BLOOD PRESSURE: 95 MMHG

## 2019-03-04 VITALS — RESPIRATION RATE: 24 BRPM | HEART RATE: 56 BPM | SYSTOLIC BLOOD PRESSURE: 94 MMHG | DIASTOLIC BLOOD PRESSURE: 47 MMHG

## 2019-03-04 VITALS — HEART RATE: 56 BPM | RESPIRATION RATE: 23 BRPM | SYSTOLIC BLOOD PRESSURE: 99 MMHG | DIASTOLIC BLOOD PRESSURE: 48 MMHG

## 2019-03-04 VITALS — SYSTOLIC BLOOD PRESSURE: 97 MMHG | HEART RATE: 56 BPM | RESPIRATION RATE: 22 BRPM | DIASTOLIC BLOOD PRESSURE: 51 MMHG

## 2019-03-04 VITALS — RESPIRATION RATE: 17 BRPM | HEART RATE: 82 BPM | SYSTOLIC BLOOD PRESSURE: 127 MMHG | DIASTOLIC BLOOD PRESSURE: 60 MMHG

## 2019-03-04 VITALS — HEART RATE: 78 BPM | SYSTOLIC BLOOD PRESSURE: 125 MMHG | DIASTOLIC BLOOD PRESSURE: 64 MMHG | RESPIRATION RATE: 18 BRPM

## 2019-03-04 VITALS — RESPIRATION RATE: 24 BRPM | DIASTOLIC BLOOD PRESSURE: 52 MMHG | SYSTOLIC BLOOD PRESSURE: 103 MMHG | HEART RATE: 58 BPM

## 2019-03-04 VITALS — HEART RATE: 58 BPM | RESPIRATION RATE: 19 BRPM | SYSTOLIC BLOOD PRESSURE: 120 MMHG | DIASTOLIC BLOOD PRESSURE: 57 MMHG

## 2019-03-04 VITALS — RESPIRATION RATE: 20 BRPM | SYSTOLIC BLOOD PRESSURE: 93 MMHG | DIASTOLIC BLOOD PRESSURE: 44 MMHG | HEART RATE: 60 BPM

## 2019-03-04 VITALS — DIASTOLIC BLOOD PRESSURE: 52 MMHG | HEART RATE: 56 BPM | RESPIRATION RATE: 23 BRPM | SYSTOLIC BLOOD PRESSURE: 106 MMHG

## 2019-03-04 LAB
ADD MAN DIFF?: NO
ANION GAP: 5 (ref 5–13)
BASOPHIL #: 0 10^3/UL (ref 0–0.1)
BASOPHILS %: 0.4 % (ref 0–2)
BLOOD UREA NITROGEN: 14 MG/DL (ref 7–20)
CALCIUM: 7.2 MG/DL (ref 8.4–10.2)
CARBON DIOXIDE: 24 MMOL/L (ref 21–31)
CHLORIDE: 109 MMOL/L (ref 97–110)
CREATININE: 0.84 MG/DL (ref 0.44–1)
EOSINOPHILS #: 0.1 10^3/UL (ref 0–0.5)
EOSINOPHILS %: 1.5 % (ref 0–7)
GLUCOSE: 120 MG/DL (ref 70–220)
HEMATOCRIT: 33.6 % (ref 37–47)
HEMOGLOBIN: 10.4 G/DL (ref 12–16)
IMMATURE GRANS #M: 0.08 10^3/UL (ref 0–0.03)
IMMATURE GRANS % (M): 0.9 % (ref 0–0.43)
LYMPHOCYTES #: 0.7 10^3/UL (ref 0.8–2.9)
LYMPHOCYTES %: 7.9 % (ref 15–51)
MEAN CORPUSCULAR HEMOGLOBIN: 25.6 PG (ref 29–33)
MEAN CORPUSCULAR HGB CONC: 31 G/DL (ref 32–37)
MEAN CORPUSCULAR VOLUME: 82.6 FL (ref 82–101)
MEAN PLATELET VOLUME: 11.1 FL (ref 7.4–10.4)
MONOCYTE #: 0.6 10^3/UL (ref 0.3–0.9)
MONOCYTES %: 6.8 % (ref 0–11)
NEUTROPHIL #: 7.5 10^3/UL (ref 1.6–7.5)
NEUTROPHILS %: 82.5 % (ref 39–77)
NUCLEATED RED BLOOD CELLS #: 0 10^3/UL (ref 0–0)
NUCLEATED RED BLOOD CELLS%: 0 /100WBC (ref 0–0)
PLATELET COUNT: 111 10^3/UL (ref 140–415)
POTASSIUM: 3.1 MMOL/L (ref 3.5–5.1)
RED BLOOD COUNT: 4.07 10^6/UL (ref 4.2–5.4)
RED CELL DISTRIBUTION WIDTH: 14.7 % (ref 11.5–14.5)
SODIUM: 138 MMOL/L (ref 135–144)
WHITE BLOOD COUNT: 9.1 10^3/UL (ref 4.8–10.8)

## 2019-03-04 PROCEDURE — 0TNB0ZZ RELEASE BLADDER, OPEN APPROACH: ICD-10-PCS | Performed by: OBSTETRICS & GYNECOLOGY

## 2019-03-04 PROCEDURE — 0DNN0ZZ RELEASE SIGMOID COLON, OPEN APPROACH: ICD-10-PCS | Performed by: OBSTETRICS & GYNECOLOGY

## 2019-03-04 PROCEDURE — 0DNU0ZZ RELEASE OMENTUM, OPEN APPROACH: ICD-10-PCS | Performed by: OBSTETRICS & GYNECOLOGY

## 2019-03-04 PROCEDURE — 0DB80ZZ EXCISION OF SMALL INTESTINE, OPEN APPROACH: ICD-10-PCS | Performed by: OBSTETRICS & GYNECOLOGY

## 2019-03-04 RX ADMIN — INSULIN ASPART 1 UNIT: 100 INJECTION, SOLUTION INTRAVENOUS; SUBCUTANEOUS at 08:00

## 2019-03-04 RX ADMIN — MORPHINE SULFATE PRN MG: 2 INJECTION, SOLUTION INTRAMUSCULAR; INTRAVENOUS at 01:18

## 2019-03-04 RX ADMIN — METRONIDAZOLE 1 MLS/HR: 500 SOLUTION INTRAVENOUS at 23:44

## 2019-03-04 RX ADMIN — DEXAMETHASONE SODIUM PHOSPHATE PRN MG: 10 INJECTION, SOLUTION INTRAMUSCULAR; INTRAVENOUS at 23:48

## 2019-03-04 RX ADMIN — INSULIN ASPART 1 UNIT: 100 INJECTION, SOLUTION INTRAVENOUS; SUBCUTANEOUS at 17:17

## 2019-03-04 RX ADMIN — ASCORBIC ACID, VITAMIN A PALMITATE, CHOLECALCIFEROL, THIAMINE HYDROCHLORIDE, RIBOFLAVIN-5 PHOSPHATE SODIUM, PYRIDOXINE HYDROCHLORIDE, NIACINAMIDE, DEXPANTHENOL, ALPHA-TOCOPHEROL ACETATE, VITAMIN K1, FOLIC ACID, BIOTIN, CYANOCOBALAMIN 1 MLS/HR: 200; 3300; 200; 6; 3.6; 6; 40; 15; 10; 150; 600; 60; 5 INJECTION, SOLUTION INTRAVENOUS at 22:47

## 2019-03-04 RX ADMIN — ONDANSETRON HYDROCHLORIDE 1 MG: 2 INJECTION, SOLUTION INTRAMUSCULAR; INTRAVENOUS at 23:48

## 2019-03-04 RX ADMIN — MORPHINE SULFATE 1 MG: 2 INJECTION, SOLUTION INTRAMUSCULAR; INTRAVENOUS at 01:18

## 2019-03-04 RX ADMIN — MORPHINE SULFATE PRN MG: 2 INJECTION, SOLUTION INTRAMUSCULAR; INTRAVENOUS at 11:48

## 2019-03-04 RX ADMIN — CEFAZOLIN SODIUM 1 MLS/HR: 2 SOLUTION INTRAVENOUS at 22:48

## 2019-03-04 RX ADMIN — INSULIN ASPART 1 UNIT: 100 INJECTION, SOLUTION INTRAVENOUS; SUBCUTANEOUS at 12:00

## 2019-03-04 RX ADMIN — POLYETHYLENE GLYCOL 3350 1 GM: 17 POWDER, FOR SOLUTION ORAL at 21:00

## 2019-03-04 RX ADMIN — POLYETHYLENE GLYCOL 3350 1 GM: 17 POWDER, FOR SOLUTION ORAL at 08:56

## 2019-03-04 RX ADMIN — MORPHINE SULFATE 1 MG: 2 INJECTION, SOLUTION INTRAMUSCULAR; INTRAVENOUS at 11:48

## 2019-03-04 RX ADMIN — HYDROCODONE BITARTRATE AND ACETAMINOPHEN PRN TAB: 5; 325 TABLET ORAL at 12:49

## 2019-03-04 RX ADMIN — ASCORBIC ACID, VITAMIN A PALMITATE, CHOLECALCIFEROL, THIAMINE HYDROCHLORIDE, RIBOFLAVIN-5 PHOSPHATE SODIUM, PYRIDOXINE HYDROCHLORIDE, NIACINAMIDE, DEXPANTHENOL, ALPHA-TOCOPHEROL ACETATE, VITAMIN K1, FOLIC ACID, BIOTIN, CYANOCOBALAMIN 1 MLS/HR: 200; 3300; 200; 6; 3.6; 6; 40; 15; 10; 150; 600; 60; 5 INJECTION, SOLUTION INTRAVENOUS at 11:48

## 2019-03-04 RX ADMIN — POLYETHYLENE GLYCOL 3350 SCH GM: 17 POWDER, FOR SOLUTION ORAL at 21:00

## 2019-03-04 RX ADMIN — Medication SCH MLS/HR: at 23:44

## 2019-03-04 RX ADMIN — INSULIN ASPART 1 UNIT: 100 INJECTION, SOLUTION INTRAVENOUS; SUBCUTANEOUS at 21:00

## 2019-03-04 RX ADMIN — FAMOTIDINE SCH MG: 20 TABLET ORAL at 08:55

## 2019-03-04 RX ADMIN — FAMOTIDINE 1 MG: 20 TABLET ORAL at 08:55

## 2019-03-04 RX ADMIN — HYDROCODONE BITARTRATE AND ACETAMINOPHEN 1 TAB: 5; 325 TABLET ORAL at 12:49

## 2019-03-04 RX ADMIN — POLYETHYLENE GLYCOL 3350 SCH GM: 17 POWDER, FOR SOLUTION ORAL at 08:56

## 2019-03-04 RX ADMIN — ASCORBIC ACID, VITAMIN A PALMITATE, CHOLECALCIFEROL, THIAMINE HYDROCHLORIDE, RIBOFLAVIN-5 PHOSPHATE SODIUM, PYRIDOXINE HYDROCHLORIDE, NIACINAMIDE, DEXPANTHENOL, ALPHA-TOCOPHEROL ACETATE, VITAMIN K1, FOLIC ACID, BIOTIN, CYANOCOBALAMIN SCH MLS/HR: 200; 3300; 200; 6; 3.6; 6; 40; 15; 10; 150; 600; 60; 5 INJECTION, SOLUTION INTRAVENOUS at 22:47

## 2019-03-04 NOTE — OPR
DATE OF OPERATION:  03/04/2019

 

 

PREOPERATIVE DIAGNOSES:

1.  Pelvic mass.

2.  Urinary retention.

3.  Ureteral obstruction.

4.  History of uterine and ovarian cancer.

 

POSTOPERATIVE DIAGNOSES:

1.  Pelvic mass.

2.  Urinary retention.

3.  Ureteral obstruction.

4.  History of uterine and ovarian cancer.

5.  Recurrent ovarian carcinoma.

 

PROCEDURES PERFORMED:

1.  Exploratory laparotomy.

2.  Extensive enterolysis.

3.  Small bowel resection.

4.  Resection of pelvic masses.

5.  Resection of pelvic tumor.

 

ASSISTANT:  None.

 

ANESTHESIA:  General endotracheal.

 

ANESTHESIOLOGIST:  Dr. Nic Rodriguez.

 

ANESTHESIA:  General and epidural.

 

ESTIMATED BLOOD LOSS:  About 200 mL.

 

INTRAOPERATIVE FINDINGS:  The patient had extensive small bowel adhesions to the anterior abdominal w
all.  15 cm of small bowel was traumatized and request small bowel resection.  There is a 20 cm cul-d
e-sac mass and shape was a mesentery cyst stuck in the cul-de-sac feed by the pelvic/cul-de-sac tumor
.  All the tumors were resected and no other lesions were noted in the abdomen or pelvis.

 

HISTORY:  This is a 58-year-old  female who in and out hospital since December for abdominal 
pain and urinary retention.  At this time, a CT scan showed a large complex cystic mass in the cul-de
-sac, pushing the bladder.  The patient also had bilateral hydronephrosis.  The patient had a Tidwell c
atheter placed and she also had bilateral ureteral stents placed by urology.  During this hospitaliza
tion, CA-125 was normal at 28.  Due to the pain and urinary retention ureter obstruction I was reques
kiesha to explore the patient.  The patient underwent adequate bowel prep.  She apparently had a MIKEL BSO
 and surgical staging in 2011 and all of these for what appeared to be synchronous tumor of the uteru
s and the ovary.  No adjuvant therapy was required.  

 

PROCEDURE:  With that in mind, the patient taken to the OR after adequate anesthesia, placed in litho
eileen position, prepped and draped in usual sterile fashion.  Tidwell catheter was inserted by nursing judy scruggs.  Her urine was very bloody from recent placement of bilateral ureteral stents.  The OR staff re
placed the Itdwell catheter.  The patient was prepped and draped in usual sterile fashion.  A vertical 
incision was made through previous scars and old scar was excised.  Incision care level above the umb
ilicus, carried down to the fascia.  Fascia entered.  We encountered extensive small bowel adhesive m
idline.  During this enterolysis process about 15 cm length of small bowel were traumatized.  Anyway,
 I went above the incision and enter without difficulty and slowly worked my way down.  I spent about
 45 minutes and performed enterolysis and delineated a small bowel.  The terminal ileum was adherent 
to the anterior abdominal wall and these were all completely mobilized.  The colon was not involving 
the adhesion process.  Once these were delineated and quite clear that this 15 cm segment of small ladonna
wel need to be reexcised due to the enterotomy.  In any case, the patient had a decent distal small b
owel to be an anastomose.  This will be the side by side-to-side small bowel anastomosis.  I proceede
d to perform small bowel resection, mesenteric border was opened using tonsil BRITNI-75 mm blue load was
 used to transect the small bowel.  The mesentery was then cauterized and transected using LigaSure i
mpact device.  There is enough mesentery of small bowel for anastomosis.  I will anastomose at the en
d of the case.  At this time, I was able to place the patient in Trendelenburg position.  Bowel was p
acked away and we slowly encounter the cul-de-sac mass, but quite clear that this mass is from a mese
ntery cyst trapped in the cul-de-sac.  As there was a filmy cyst wall that is already leaking.  I tri
ed to dissect the cyst wall away from the bladder anteriorly and the mesentery of the rectosigmoid co
orly posteriorly.  It is quite clear that this is the mesentery cyst.  Therefore, the entire cyst vernell
ot be removed.  At this time, I then entered the cyst and evacuated a good amount of fluid that was s
tuck in the cul-de-sac.  This is mesenteric cyst is the reason why the patient is having urinary rete
ntion and also ureteral obstruction.  At this time, I then proceeded to explore the cul-de-sac and in
deed there were two separate tumor masses, one on the right side, one on the left side.  These were a
ll pretty much peritoneal masses and were able to remove easily from the sidewall without difficulty.
  These were removed and sent to pathology for frozen section.  Again, I attempted to remove the cyst
 wall with mesentery cyst and this essentially was the peritoneum in the pelvic area.  It is difficul
t to be stripped.  In any case, all the visible tumors were extracted and removed from the cul-de-sac
.  At this time, I also double checked the cecum and the cecum was intact.  At this time, I was satis
fied with the procedure.  Pelvis thoroughly irrigated.  The frozen section came back adenocarcinoma f
avoring papillary serous.  Next, I then proceeded to irrigate the pelvis and it was all hemostatic.  
Next, I then prepared the small bowel for yles-yi-zchw anastomosis.  The small bowel was opened at th
e antimesenteric border.  A BRITNI-75 mm blue load was used to create this anastomosis.  The edges side-
by-side was then grasped using Allis clamp and TA 60 stapler was used to completely cnkp-cy-bmpi anas
tomosis here.  The yiuz-lc-iewr was further reinforced using 3-0 silk suture and the mesenteric defec
t was also closed using 2-0 silk suture.  At this time, the pelvis was thoroughly irrigated.  Instrum
ent count, lap count was correct.  I placed a Jaylon drain into the right lower quadrant and this anch
ored to the skin and the drain was placed in the cul-de-sac.  Instrument, lap count was correct and I
 close the fascia using looped PDS in a mass 7 closure fashion.  Subcutaneous tissue was irrigated.  
The skin was approximated using stainless steel staple.  At this time, the patient was cleansed, dres
sing applied.  She was extubated and transferred to recovery in stable condition.

 

 

Dictated By: LILY YING/CHONG

DD:    03/04/2019 20:55:39

DT:    03/04/2019 21:58:00

Conf#: [id number]

DID#:  [id number]

CC: JOY RAMIREZ MD;*EndCC*

## 2019-03-04 NOTE — PN
Date/Time of Note


Date/Time of Note


DATE: 3/4/19 


TIME: 09:16





Subjective


Doing well.  No new complaints.  Continues to have lower abdominal pain





Objective


Vitals





Vital Signs


  Date      Temp  Pulse  Resp  B/P (MAP)   Pulse Ox  O2          O2 Flow    FiO2


Time                                                 Delivery    Rate


    3/4/19  98.2     82    17      127/60        94


     07:40                           (82)


    3/3/19                                           Room Air


     14:31


    3/1/19                                                             2.0


     18:24








Intake and Output





3/3/19


3/3/19


3/4/19





1515:00


23:00


07:00





IntakeIntake Total


100 ml


1800 ml


550 ml





OutputOutput Total


1200 ml


850 ml





BalanceBalance


100 ml


600 ml


-300 ml











Lungs clear to auscultation bilaterally


Cardiac regular rate and rhythm


Abdomen with lower tenderness to palpation.  Normoactive bowel sounds


Extremities no clubbing cyanosis or edema


Neurological nonfocal





Results


Result Diagram:  


3/2/19 0652








Medications


Medications





Current Medications


Sodium Chloride 1,000 ml @  75 mls/hr [id number] IV  Last administered on 3/3/19at 


22:30; Admin Dose 75 MLS/HR;  Start 2/26/19 at 10:55


IV Flush (NS 3 ml) 3 ml PER PROTOCOL IV ;  Start 2/26/19 at 11:00


IV Flush (NS 3 ml) 3 ml PER PROTOCOL IV ;  Start 2/26/19 at 11:00


Acetaminophen (Tylenol Tab) 650 mg Q6H  PRN PO .PAIN 1-3 OR TEMP;  Start 2/26/19


at 11:00


Morphine Sulfate (morphine) 2 mg Q4H  PRN IV .SEVERE PAIN 7-10 Last administered


on 3/4/19at 01:18; Admin Dose 2 MG;  Start 2/26/19 at 11:00


Docusate Sodium (Colace) 100 mg Q12H  PRN PO .CONSTIPATION Last administered on 


2/27/19at 18:16; Admin Dose 100 MG;  Start 2/26/19 at 11:00


Zolpidem Tartrate (Ambien) 5 mg QHS  PRN PO .INSOMNIA;  Start 2/26/19 at 11:00


Famotidine (Pepcid) 20 mg DAILY PO  Last administered on 3/4/19at 08:55; Admin 


Dose 20 MG;  Start 2/26/19 at 11:30


Carvedilol (Coreg) 3.125 mg BID PO  Last administered on 3/4/19at 08:56; Admin 


Dose 3.125 MG;  Start 2/26/19 at 11:30


Diagnostic Test (Pha) (Accu-Chek) 1 ea 02 XX ;  Start 2/27/19 at 02:00


Insulin Aspart (Novolog Insulin Pen) NOVOLOG *MILD* ALGORITHM WITH MEALS  


BEDTIME SC ;  Start 2/26/19 at 12:00


Miscellaneous Information 1 ea NOTE XX ;  Start 2/26/19 at 12:30


Glucose (Glutose) 15 gm Q15M  PRN PO DECREASED GLUCOSE;  Start 2/26/19 at 12:30


Glucose (Glutose) 22.5 gm Q15M  PRN PO DECREASED GLUCOSE;  Start 2/26/19 at 


12:30


Dextrose (D50w Syringe) 25 ml Q15M  PRN IV DECREASED GLUCOSE;  Start 2/26/19 at 


12:30


Dextrose (D50w Syringe) 50 ml Q15M  PRN IV DECREASED GLUCOSE;  Start 2/26/19 at 


12:30


Glucagon (Glucagen) 1 mg Q15M  PRN IM DECREASED GLUCOSE;  Start 2/26/19 at 12:30


Glucose (Glutose) 15 gm Q15M  PRN BUCCAL DECREASED GLUCOSE;  Start 2/26/19 at 


12:30


Polyethylene Glycol (Miralax) 17 gm BID PO  Last administered on 3/4/19at 08:56;


Admin Dose 17 GM;  Start 2/27/19 at 09:00


Clonidine (Catapres) 0.1 mg Q4H  PRN PO ELEVATED SYSTOLIC BP Last administered 


on 2/28/19at 20:40; Admin Dose 0.1 MG;  Start 2/28/19 at 15:30


Levalbuterol (Xopenex Neb) 1.25 mg PACU ORDER PRN HHN .WHEEZING;  Start 3/1/19 


at 12:30


Acetaminophen/ Hydrocodone Bitart (Norco (5/325)) 1 tab Q4H  PRN PO MODERATE 


PAIN LEVEL 4-6 Last administered on 3/3/19at 12:24; Admin Dose 1 TAB;  Start 


3/1/19 at 22:00


Ondansetron HCl (Zofran Inj) 4 mg Q4H  PRN IV NAUSEA AND/OR VOMITING Last 


administered on 3/3/19at 16:58; Admin Dose 4 MG;  Start 3/3/19 at 15:46





VTE Prophylaxis


Risk score (from Ns)>0 risk:  3


SCD applied (from [facility]):  Yes





Lines/Catheters


IV Catheter Type:  Saline Lock


Carbajal in Place:  Yes


Cont'd carbajal catheter reason:  other (indicate)





Assessment/Plan


Assessment/Plan


58-year-old female with pelvic mass


History of synchronous uterine and ovarian cancer


Status post total abdominal hysterectomy in 2011


Acute kidney injury


Status post bilateral ureteral stent placement





N.p.o.


On-call to OR this evening for pelvic mass resection


Monitor renal function











JOY RAMIREZ MD                   Mar 4, 2019 09:18

## 2019-03-04 NOTE — CONS
Assessment/Plan


Assessment/Plan


Assessment/Plan (Daily)


1. Acute kidney injury due to possible prerenal azotemia + possibely obstruction


from mass


2.  H/o Uterine CA s/p hysterectomy, now concerned about recurrence of tumour 


3. pelvis mass, pssible recurrence 


4. Bilateral Hydronephrosis Cystoscopy and insertion of bilateral ureteral JJ 


stents on 3/1/19 by Dr. Han  


5. H/O HTN


6. H/o DM II





Plan:


Cystoscopy and insertion of bilateral ureteral JJ stents on 3/1/19 by Dr. Han, BUN/Cr improved to 14/0.84- K low, replaced for today 


s/p GYN oncology consult - Plan for OR today by dr. Brown 


IVF NS at 75 cc/hr


Uric acid 5.8


will follow up





Consultation Date/Type/Reason


Admit Date/Time


Feb 26, 2019 at 10:49


Initial Consult Date


2/26/19


Type of Consult


NEPHROLOGY


Requesting Provider:  JOY RAMIREZ MD


Date/Time of Note


DATE: 3/4/19 


TIME: 11:41





Exam/Review of Systems


Exam


Vitals





Vital Signs


  Date      Temp  Pulse  Resp  B/P (MAP)   Pulse Ox  O2          O2 Flow    FiO2


Time                                                 Delivery    Rate


    3/4/19  98.2     82    17      127/60        94


     07:40                           (82)


    3/3/19                                           Room Air


     14:31


    3/1/19                                                             2.0


     18:24








Intake and Output





3/3/19


3/3/19


3/4/19





1515:00


23:00


07:00





IntakeIntake Total


100 ml


1800 ml


550 ml





OutputOutput Total


1200 ml


850 ml





BalanceBalance


100 ml


600 ml


-300 ml











Exam


Constitutional:  alert


Respiratory:  clear to auscultation, normal air movement


Cardiovascular:  regular rate and rhythm, nl pulses


Gastrointestinal:  soft, non-tender


Musculoskeletal:  nl extremities to inspection


Extremities:  normal pulses


Neurological:  CNS II-XII intact, nl mental status, nl speech, nl strength





Results


Result Diagram:  


3/2/19 0652





Results 24hrs





Laboratory Tests


    Test
            3/3/19
12:24  3/3/19
17:29  3/3/19
20:41  3/4/19
08:54


    Bedside Glucose          85           100            93            74








Medications


Medication





Current Medications


IV Flush (NS 3 ml) 3 ml PER PROTOCOL IV ;  Start 2/26/19 at 11:00


IV Flush (NS 3 ml) 3 ml PER PROTOCOL IV ;  Start 2/26/19 at 11:00


Acetaminophen (Tylenol Tab) 650 mg Q6H  PRN PO .PAIN 1-3 OR TEMP;  Start 2/26/19


at 11:00


Morphine Sulfate (morphine) 2 mg Q4H  PRN IV .SEVERE PAIN 7-10 Last administered


on 3/4/19at 01:18; Admin Dose 2 MG;  Start 2/26/19 at 11:00


Docusate Sodium (Colace) 100 mg Q12H  PRN PO .CONSTIPATION Last administered on 


2/27/19at 18:16; Admin Dose 100 MG;  Start 2/26/19 at 11:00


Zolpidem Tartrate (Ambien) 5 mg QHS  PRN PO .INSOMNIA;  Start 2/26/19 at 11:00


Famotidine (Pepcid) 20 mg DAILY PO  Last administered on 3/4/19at 08:55; Admin 


Dose 20 MG;  Start 2/26/19 at 11:30


Carvedilol (Coreg) 3.125 mg BID PO  Last administered on 3/4/19at 08:56; Admin 


Dose 3.125 MG;  Start 2/26/19 at 11:30


Diagnostic Test (Pha) (Accu-Chek) 1 ea 02 XX ;  Start 2/27/19 at 02:00


Insulin Aspart (Novolog Insulin Pen) NOVOLOG *MILD* ALGORITHM WITH MEALS  


BEDTIME SC ;  Start 2/26/19 at 12:00


Miscellaneous Information 1 ea NOTE XX ;  Start 2/26/19 at 12:30


Glucose (Glutose) 15 gm Q15M  PRN PO DECREASED GLUCOSE;  Start 2/26/19 at 12:30


Glucose (Glutose) 22.5 gm Q15M  PRN PO DECREASED GLUCOSE;  Start 2/26/19 at 


12:30


Dextrose (D50w Syringe) 25 ml Q15M  PRN IV DECREASED GLUCOSE;  Start 2/26/19 at 


12:30


Dextrose (D50w Syringe) 50 ml Q15M  PRN IV DECREASED GLUCOSE;  Start 2/26/19 at 


12:30


Glucagon (Glucagen) 1 mg Q15M  PRN IM DECREASED GLUCOSE;  Start 2/26/19 at 12:30


Glucose (Glutose) 15 gm Q15M  PRN BUCCAL DECREASED GLUCOSE;  Start 2/26/19 at 


12:30


Polyethylene Glycol (Miralax) 17 gm BID PO  Last administered on 3/4/19at 08:56;


Admin Dose 17 GM;  Start 2/27/19 at 09:00


Clonidine (Catapres) 0.1 mg Q4H  PRN PO ELEVATED SYSTOLIC BP Last administered 


on 2/28/19at 20:40; Admin Dose 0.1 MG;  Start 2/28/19 at 15:30


Levalbuterol (Xopenex Neb) 1.25 mg PACU ORDER PRN HHN .WHEEZING;  Start 3/1/19 


at 12:30


Acetaminophen/ Hydrocodone Bitart (Norco (5/325)) 1 tab Q4H  PRN PO MODERATE 


PAIN LEVEL 4-6 Last administered on 3/3/19at 12:24; Admin Dose 1 TAB;  Start 


3/1/19 at 22:00


Ondansetron HCl (Zofran Inj) 4 mg Q4H  PRN IV NAUSEA AND/OR VOMITING Last 


administered on 3/3/19at 16:58; Admin Dose 4 MG;  Start 3/3/19 at 15:46


Dextrose/Sodium Chloride 1,000 ml @  70 mls/hr [id number] IV ;  Start 3/4/19 at 


10:30











MARGIE GODINEZ MD            Mar 4, 2019 11:41

## 2019-03-04 NOTE — OPR
Date/Time of Note


Date/Time of Note


DATE: 3/4/19 


TIME: 20:39





Operative Report


Procedure Date:  Mar 4, 2019


Preoperative Diagnosis


Pelvic mass, h/o uterine and ovarian cancer, urinary retention and ureteral 


obstruction.


Postoperative Diagnosis


Recurrent ovarian cancer


Operation/Procedure Performed


Exploratory laparotomy, enterolysis, Small bowel resection, resection of pelvic 


mass.


Surgeon


see signature line


Assistant


None


Anesthesia Type:  general, epidural


Estimated Blood Loss:  150 - 200 ml's


Transfusion


   none


Specimen


Small bowel, pelvc and cul-de-sac tumor


Grafts/Implants


none


Tubes/Drains


Jaylon x 1


Complications


none


Pt Condition Post Procedure:  stable


Disposition:  PACU


Procedure Description


pelvic and cul-de-sac tumor with mesentery cyst trapped in cul-de-sac.  SB 


adhesion to anterior abdominal wall requiring resection due to enterolysis 


trauma. No other tumor noted.











KRISH GUAJARDO MD                Mar 4, 2019 20:44

## 2019-03-04 NOTE — PREAC
Date/Time of Note


Date/Time of Note


DATE: 3/4/19 


TIME: 18:11





Anesthesia Eval and Record


Evaluation


Time Pre-Procedure Interview


DATE: 3/4/19 


TIME: 18:11


Age


58


Sex


female


NPO:  8 hrs


Preoperative diagnosis


pelvic mass


Planned procedure


exploratory laparotomy





Past Medical History


Past Medical History:  Includes


Cardio:  HTN


GI:  GERD





Surgery & Anesthesia Issues


No known issue





Meds


Anticoagulation:  No


Beta Blocker within 24 hr:  No


Reason Beta Blocker not given:  Pt. not on B-Blocker


Active Scripts


Hydrocodone/Acetaminophen (Norco 5-325 Tablet) 1 Each Tablet, 1 EACH PO Q4, #20 


TAB


   Prov:JOY RAMIREZ MD         3/1/19


Reported Medications


Tramadol Hcl* (Ultram*) 50 Mg Tablet, 50 MG PO Q6H PRN for PAIN, #15 TAB


   2/26/19


Cephalexin* (Cephalexin*) 500 Mg Capsule, 500 MG PO Q6 for 7 Days, #28 CAP


   2/26/19


Empagliflozin (Jardiance) 10 Mg Tablet, 10 MG PO DAILY, TAB


   12/29/18


Omeprazole* (Omeprazole*) 40 Mg Capsule.dr, 40 MG PO DAILY, #30 CAP


   12/29/18


Carvedilol* (Carvedilol*) 3.125 Mg Tablet, 3.125 MG PO BID, #60 TAB


   12/29/18


Benazepril Hcl* (Benazepril Hcl*) 40 Mg Tablet, 40 MG PO DAILY, #30 TAB


   12/29/18


Discontinued Scripts


Acetaminophen with Codeine (Acetaminophen-Cod #3 Tablet) 1 Each Tablet, 1 TAB PO


Q6H PRN for PAIN, #10 TAB


   Prov:KATI TORRES DO         2/21/19





Current Medications


IV Flush (NS 3 ml) 3 ml PER PROTOCOL IV ;  Start 2/26/19 at 11:00


IV Flush (NS 3 ml) 3 ml PER PROTOCOL IV ;  Start 2/26/19 at 11:00


Acetaminophen (Tylenol Tab) 650 mg Q6H  PRN PO .PAIN 1-3 OR TEMP;  Start 2/26/19


at 11:00


Docusate Sodium (Colace) 100 mg Q12H  PRN PO .CONSTIPATION Last administered on 


2/27/19at 18:16; Admin Dose 100 MG;  Start 2/26/19 at 11:00


Zolpidem Tartrate (Ambien) 5 mg QHS  PRN PO .INSOMNIA;  Start 2/26/19 at 11:00


Famotidine (Pepcid) 20 mg DAILY PO  Last administered on 3/4/19at 08:55; Admin 


Dose 20 MG;  Start 2/26/19 at 11:30


Carvedilol (Coreg) 3.125 mg BID PO  Last administered on 3/4/19at 08:56; Admin 


Dose 3.125 MG;  Start 2/26/19 at 11:30


Diagnostic Test (Pha) (Accu-Chek) 1 ea 02 XX ;  Start 2/27/19 at 02:00


Miscellaneous Information 1 ea NOTE XX ;  Start 2/26/19 at 12:30


Glucose (Glutose) 15 gm Q15M  PRN PO DECREASED GLUCOSE;  Start 2/26/19 at 12:30


Glucose (Glutose) 22.5 gm Q15M  PRN PO DECREASED GLUCOSE;  Start 2/26/19 at 


12:30


Dextrose (D50w Syringe) 25 ml Q15M  PRN IV DECREASED GLUCOSE;  Start 2/26/19 at 


12:30


Dextrose (D50w Syringe) 50 ml Q15M  PRN IV DECREASED GLUCOSE;  Start 2/26/19 at 


12:30


Glucagon (Glucagen) 1 mg Q15M  PRN IM DECREASED GLUCOSE;  Start 2/26/19 at 12:30


Glucose (Glutose) 15 gm Q15M  PRN BUCCAL DECREASED GLUCOSE;  Start 2/26/19 at 


12:30


Polyethylene Glycol (Miralax) 17 gm BID PO  Last administered on 3/4/19at 08:56;


Admin Dose 17 GM;  Start 2/27/19 at 09:00


Clonidine (Catapres) 0.1 mg Q4H  PRN PO ELEVATED SYSTOLIC BP Last administered 


on 2/28/19at 20:40; Admin Dose 0.1 MG;  Start 2/28/19 at 15:30


Levalbuterol (Xopenex Neb) 1.25 mg PACU ORDER PRN HHN .WHEEZING;  Start 3/1/19 


at 12:30


Acetaminophen/ Hydrocodone Bitart (Norco (5/325)) 1 tab Q4H  PRN PO MODERATE 


PAIN LEVEL 4-6 Last administered on 3/4/19at 12:49; Admin Dose 1 TAB;  Start 


3/1/19 at 22:00


Ondansetron HCl (Zofran Inj) 4 mg Q4H  PRN IV NAUSEA AND/OR VOMITING Last 


administered on 3/3/19at 16:58; Admin Dose 4 MG;  Start 3/3/19 at 15:46


Dextrose/Sodium Chloride 1,000 ml @  70 mls/hr [id number] IV  Last administered on 


3/4/19at 11:48; Admin Dose 70 MLS/HR;  Start 3/4/19 at 10:30


Morphine Sulfate (morphine) 6 mg Q4H  PRN PO SEVERE PAIN LEVEL 7-10;  Start 


3/4/19 at 16:00


Insulin Aspart (Novolog Insulin Pen) (Adult SC Insulin - Mild Algorithm)... Q4 


SC ;  Start 3/4/19 at 21:00


Meds reviewed:  Yes





Allergies


Coded Allergies:  


     No Known Allergy (Unverified , 2/26/19)


Allergies Reviewed:  Yes





Labs/Studies


Labs Reviewed:  Reviewed by anesthesiologist


Result Diagram:  


3/2/19 0652








Blood Bank


                         Test
            3/4/19
06:55


                         Antibody Screen  NEGATIVE


                         Blood Type       O POSITIVE





Pregnancy test:  Negative


Studies:  ECG





Pre-procedure Exam


Last vitals





Vital Signs


  Date      Temp  Pulse  Resp  B/P (MAP)   Pulse Ox  O2          O2 Flow    FiO2


Time                                                 Delivery    Rate


    3/4/19  98.2     70    18      149/68        93  Room Air


     14:32                           (95)


    3/1/19                                                             2.0


     18:24





Airway:  Adequate mouth opening, Adequate thyromental dist


Mallampati:  Mallampati II


Teeth:  Normal


Lung:  Normal


Heart:  Normal





ASA Physical Status


ASA physical status:  3


Emergency:  None





Planned Anesthetic


General/MAC:  ETT


Neuraxial:  Epidural


Nerve block:  TAP (bilateral)





Pre-operative Attestations


Prior to commencing anesthesia and surgery, the patient was re-evaluated, there 


was verification of:


*The patient's identity


*The results of appropriate recent lab work and preoperative vital signs


*The above evaluation not changing prior to induction


*Anesthetic plan, risk benefits, alternative and complications discussed with 


patient/family; questions answered; patient/family understands, accepts and w


ishes to proceed.











MEGHAN HOUSE                Mar 4, 2019 18:12

## 2019-03-04 NOTE — HPN
Date/Time of Note


Date/Time of Note


DATE: 3/4/19 


TIME: 18:16





Interval H&P Admission Note


Pt. seen H&P reviewed:  No system changes











KRISH GUAJARDO MD                Mar 4, 2019 18:16

## 2019-03-04 NOTE — PAC
Date/Time of Note


Date/Time of Note


DATE: 3/4/19 


TIME: 22:19





Post-Anesthesia Notes


Post-Anesthesia Note


Last documented vital signs





Vital Signs


  Date      Temp  Pulse  Resp  B/P (MAP)   Pulse Ox  O2          O2 Flow    FiO2


Time                                                 Delivery    Rate


    3/4/19           58    19      120/57        98  Nasal             2.0


     21:48                           (78)            Cannula


    3/4/19  98.0


     20:43





Activity:  WNL


Respiratory function:  WNL


Cardiovascular function:  WNL


Mental status:  Baseline


Pain reasonably controlled:  Yes


Hydration appropriate:  Yes


Nausea/Vomiting absent:  Yes











MEGHAN HOUSE                Mar 4, 2019 22:19

## 2019-03-05 VITALS — DIASTOLIC BLOOD PRESSURE: 68 MMHG | RESPIRATION RATE: 17 BRPM | SYSTOLIC BLOOD PRESSURE: 150 MMHG | HEART RATE: 89 BPM

## 2019-03-05 VITALS — SYSTOLIC BLOOD PRESSURE: 156 MMHG | DIASTOLIC BLOOD PRESSURE: 80 MMHG | HEART RATE: 79 BPM | RESPIRATION RATE: 18 BRPM

## 2019-03-05 VITALS — SYSTOLIC BLOOD PRESSURE: 146 MMHG | HEART RATE: 98 BPM | RESPIRATION RATE: 20 BRPM | DIASTOLIC BLOOD PRESSURE: 70 MMHG

## 2019-03-05 VITALS — DIASTOLIC BLOOD PRESSURE: 80 MMHG | SYSTOLIC BLOOD PRESSURE: 151 MMHG | RESPIRATION RATE: 18 BRPM | HEART RATE: 78 BPM

## 2019-03-05 VITALS — RESPIRATION RATE: 18 BRPM | DIASTOLIC BLOOD PRESSURE: 99 MMHG | SYSTOLIC BLOOD PRESSURE: 181 MMHG | HEART RATE: 89 BPM

## 2019-03-05 VITALS — RESPIRATION RATE: 18 BRPM | DIASTOLIC BLOOD PRESSURE: 75 MMHG | HEART RATE: 75 BPM | SYSTOLIC BLOOD PRESSURE: 154 MMHG

## 2019-03-05 LAB
ABNORMAL IP MESSAGE: 1
ADD MAN DIFF?: NO
ALANINE AMINOTRANSFERASE: 17 IU/L (ref 13–69)
ALBUMIN/GLOBULIN RATIO: 0.89
ALBUMIN: 2.6 G/DL (ref 3.3–4.9)
ALKALINE PHOSPHATASE: 70 IU/L (ref 42–121)
ANION GAP: 8 (ref 5–13)
ASPARTATE AMINO TRANSFERASE: 18 IU/L (ref 15–46)
BASOPHIL #: 0.1 10^3/UL (ref 0–0.1)
BASOPHILS %: 0.6 % (ref 0–2)
BILIRUBIN,DIRECT: 0 MG/DL (ref 0–0.2)
BILIRUBIN,TOTAL: 0.1 MG/DL (ref 0.2–1.3)
BLOOD UREA NITROGEN: 12 MG/DL (ref 7–20)
CALCIUM: 7.4 MG/DL (ref 8.4–10.2)
CARBON DIOXIDE: 27 MMOL/L (ref 21–31)
CHLORIDE: 106 MMOL/L (ref 97–110)
CREATININE: 0.98 MG/DL (ref 0.44–1)
EOSINOPHILS #: 0.1 10^3/UL (ref 0–0.5)
EOSINOPHILS %: 0.8 % (ref 0–7)
GLOBULIN: 2.9 G/DL (ref 1.3–3.2)
GLUCOSE: 169 MG/DL (ref 70–220)
HEMATOCRIT: 37.3 % (ref 37–47)
HEMOGLOBIN: 11.4 G/DL (ref 12–16)
IMMATURE GRANS #M: 0.06 10^3/UL (ref 0–0.03)
IMMATURE GRANS % (M): 0.7 % (ref 0–0.43)
LYMPHOCYTES #: 0.5 10^3/UL (ref 0.8–2.9)
LYMPHOCYTES %: 6.4 % (ref 15–51)
MEAN CORPUSCULAR HEMOGLOBIN: 25.5 PG (ref 29–33)
MEAN CORPUSCULAR HGB CONC: 30.6 G/DL (ref 32–37)
MEAN CORPUSCULAR VOLUME: 83.4 FL (ref 82–101)
MEAN PLATELET VOLUME: 11.9 FL (ref 7.4–10.4)
MONOCYTE #: 0.9 10^3/UL (ref 0.3–0.9)
MONOCYTES %: 10.9 % (ref 0–11)
NEUTROPHIL #: 6.7 10^3/UL (ref 1.6–7.5)
NEUTROPHILS %: 80.6 % (ref 39–77)
NUCLEATED RED BLOOD CELLS #: 0 10^3/UL (ref 0–0)
NUCLEATED RED BLOOD CELLS%: 0 /100WBC (ref 0–0)
PLATELET COUNT: 101 10^3/UL (ref 140–415)
POSITIVE DIFF: (no result)
POTASSIUM: 3.6 MMOL/L (ref 3.5–5.1)
RED BLOOD COUNT: 4.47 10^6/UL (ref 4.2–5.4)
RED CELL DISTRIBUTION WIDTH: 14.6 % (ref 11.5–14.5)
SODIUM: 141 MMOL/L (ref 135–144)
TOTAL PROTEIN: 5.5 G/DL (ref 6.1–8.1)
WHITE BLOOD COUNT: 8.3 10^3/UL (ref 4.8–10.8)

## 2019-03-05 RX ADMIN — MORPHINE SULFATE PRN MG: 2 INJECTION, SOLUTION INTRAMUSCULAR; INTRAVENOUS at 20:37

## 2019-03-05 RX ADMIN — CEFAZOLIN SODIUM 1 MLS/HR: 2 SOLUTION INTRAVENOUS at 13:04

## 2019-03-05 RX ADMIN — INSULIN ASPART 1 UNIT: 100 INJECTION, SOLUTION INTRAVENOUS; SUBCUTANEOUS at 01:00

## 2019-03-05 RX ADMIN — CEFAZOLIN SODIUM 1 MLS/HR: 2 SOLUTION INTRAVENOUS at 06:20

## 2019-03-05 RX ADMIN — Medication SCH MLS/HR: at 13:57

## 2019-03-05 RX ADMIN — INSULIN ASPART 1 UNIT: 100 INJECTION, SOLUTION INTRAVENOUS; SUBCUTANEOUS at 20:49

## 2019-03-05 RX ADMIN — MORPHINE SULFATE 1 MG: 2 INJECTION, SOLUTION INTRAMUSCULAR; INTRAVENOUS at 18:18

## 2019-03-05 RX ADMIN — ASCORBIC ACID, VITAMIN A PALMITATE, CHOLECALCIFEROL, THIAMINE HYDROCHLORIDE, RIBOFLAVIN-5 PHOSPHATE SODIUM, PYRIDOXINE HYDROCHLORIDE, NIACINAMIDE, DEXPANTHENOL, ALPHA-TOCOPHEROL ACETATE, VITAMIN K1, FOLIC ACID, BIOTIN, CYANOCOBALAMIN SCH MLS/HR: 200; 3300; 200; 6; 3.6; 6; 40; 15; 10; 150; 600; 60; 5 INJECTION, SOLUTION INTRAVENOUS at 19:27

## 2019-03-05 RX ADMIN — MORPHINE SULFATE PRN MG: 2 INJECTION, SOLUTION INTRAMUSCULAR; INTRAVENOUS at 18:18

## 2019-03-05 RX ADMIN — ASCORBIC ACID, VITAMIN A PALMITATE, CHOLECALCIFEROL, THIAMINE HYDROCHLORIDE, RIBOFLAVIN-5 PHOSPHATE SODIUM, PYRIDOXINE HYDROCHLORIDE, NIACINAMIDE, DEXPANTHENOL, ALPHA-TOCOPHEROL ACETATE, VITAMIN K1, FOLIC ACID, BIOTIN, CYANOCOBALAMIN 1 MLS/HR: 200; 3300; 200; 6; 3.6; 6; 40; 15; 10; 150; 600; 60; 5 INJECTION, SOLUTION INTRAVENOUS at 06:00

## 2019-03-05 RX ADMIN — MORPHINE SULFATE PRN MG: 10 SOLUTION ORAL at 17:31

## 2019-03-05 RX ADMIN — MORPHINE SULFATE 1 MG: 2 INJECTION, SOLUTION INTRAMUSCULAR; INTRAVENOUS at 20:37

## 2019-03-05 RX ADMIN — MORPHINE SULFATE 1 MG: 2 INJECTION, SOLUTION INTRAMUSCULAR; INTRAVENOUS at 12:17

## 2019-03-05 RX ADMIN — ASCORBIC ACID, VITAMIN A PALMITATE, CHOLECALCIFEROL, THIAMINE HYDROCHLORIDE, RIBOFLAVIN-5 PHOSPHATE SODIUM, PYRIDOXINE HYDROCHLORIDE, NIACINAMIDE, DEXPANTHENOL, ALPHA-TOCOPHEROL ACETATE, VITAMIN K1, FOLIC ACID, BIOTIN, CYANOCOBALAMIN 1 MLS/HR: 200; 3300; 200; 6; 3.6; 6; 40; 15; 10; 150; 600; 60; 5 INJECTION, SOLUTION INTRAVENOUS at 19:27

## 2019-03-05 RX ADMIN — FAMOTIDINE 1 MG: 10 INJECTION, SOLUTION INTRAVENOUS at 08:36

## 2019-03-05 RX ADMIN — MORPHINE SULFATE PRN MG: 2 INJECTION, SOLUTION INTRAMUSCULAR; INTRAVENOUS at 12:17

## 2019-03-05 RX ADMIN — INSULIN ASPART 1 UNIT: 100 INJECTION, SOLUTION INTRAVENOUS; SUBCUTANEOUS at 05:44

## 2019-03-05 RX ADMIN — MORPHINE SULFATE 1 MG: 2 INJECTION, SOLUTION INTRAMUSCULAR; INTRAVENOUS at 09:27

## 2019-03-05 RX ADMIN — MORPHINE SULFATE 1 MG: 2 INJECTION, SOLUTION INTRAMUSCULAR; INTRAVENOUS at 01:07

## 2019-03-05 RX ADMIN — Medication SCH MLS/HR: at 05:43

## 2019-03-05 RX ADMIN — ASCORBIC ACID, VITAMIN A PALMITATE, CHOLECALCIFEROL, THIAMINE HYDROCHLORIDE, RIBOFLAVIN-5 PHOSPHATE SODIUM, PYRIDOXINE HYDROCHLORIDE, NIACINAMIDE, DEXPANTHENOL, ALPHA-TOCOPHEROL ACETATE, VITAMIN K1, FOLIC ACID, BIOTIN, CYANOCOBALAMIN SCH MLS/HR: 200; 3300; 200; 6; 3.6; 6; 40; 15; 10; 150; 600; 60; 5 INJECTION, SOLUTION INTRAVENOUS at 14:00

## 2019-03-05 RX ADMIN — ASCORBIC ACID, VITAMIN A PALMITATE, CHOLECALCIFEROL, THIAMINE HYDROCHLORIDE, RIBOFLAVIN-5 PHOSPHATE SODIUM, PYRIDOXINE HYDROCHLORIDE, NIACINAMIDE, DEXPANTHENOL, ALPHA-TOCOPHEROL ACETATE, VITAMIN K1, FOLIC ACID, BIOTIN, CYANOCOBALAMIN 1 MLS/HR: 200; 3300; 200; 6; 3.6; 6; 40; 15; 10; 150; 600; 60; 5 INJECTION, SOLUTION INTRAVENOUS at 09:29

## 2019-03-05 RX ADMIN — INSULIN ASPART 1 UNIT: 100 INJECTION, SOLUTION INTRAVENOUS; SUBCUTANEOUS at 12:16

## 2019-03-05 RX ADMIN — HYDROCODONE BITARTRATE AND ACETAMINOPHEN 1 TAB: 10; 325 TABLET ORAL at 08:37

## 2019-03-05 RX ADMIN — MORPHINE SULFATE 1 MG: 2 INJECTION, SOLUTION INTRAMUSCULAR; INTRAVENOUS at 05:42

## 2019-03-05 RX ADMIN — MORPHINE SULFATE 1 MG: 2 INJECTION, SOLUTION INTRAMUSCULAR; INTRAVENOUS at 15:54

## 2019-03-05 RX ADMIN — POLYETHYLENE GLYCOL 3350 1 GM: 17 POWDER, FOR SOLUTION ORAL at 08:37

## 2019-03-05 RX ADMIN — MORPHINE SULFATE PRN MG: 2 INJECTION, SOLUTION INTRAMUSCULAR; INTRAVENOUS at 15:54

## 2019-03-05 RX ADMIN — MORPHINE SULFATE PRN MG: 2 INJECTION, SOLUTION INTRAMUSCULAR; INTRAVENOUS at 05:42

## 2019-03-05 RX ADMIN — POLYETHYLENE GLYCOL 3350 1 GM: 17 POWDER, FOR SOLUTION ORAL at 20:39

## 2019-03-05 RX ADMIN — ASCORBIC ACID, VITAMIN A PALMITATE, CHOLECALCIFEROL, THIAMINE HYDROCHLORIDE, RIBOFLAVIN-5 PHOSPHATE SODIUM, PYRIDOXINE HYDROCHLORIDE, NIACINAMIDE, DEXPANTHENOL, ALPHA-TOCOPHEROL ACETATE, VITAMIN K1, FOLIC ACID, BIOTIN, CYANOCOBALAMIN SCH MLS/HR: 200; 3300; 200; 6; 3.6; 6; 40; 15; 10; 150; 600; 60; 5 INJECTION, SOLUTION INTRAVENOUS at 09:29

## 2019-03-05 RX ADMIN — METRONIDAZOLE 1 MLS/HR: 500 SOLUTION INTRAVENOUS at 05:43

## 2019-03-05 RX ADMIN — INSULIN ASPART 1 UNIT: 100 INJECTION, SOLUTION INTRAVENOUS; SUBCUTANEOUS at 08:40

## 2019-03-05 RX ADMIN — MORPHINE SULFATE 1 MG: 10 SOLUTION ORAL at 22:49

## 2019-03-05 RX ADMIN — POLYETHYLENE GLYCOL 3350 SCH GM: 17 POWDER, FOR SOLUTION ORAL at 20:39

## 2019-03-05 RX ADMIN — MORPHINE SULFATE PRN MG: 2 INJECTION, SOLUTION INTRAMUSCULAR; INTRAVENOUS at 09:27

## 2019-03-05 RX ADMIN — ASCORBIC ACID, VITAMIN A PALMITATE, CHOLECALCIFEROL, THIAMINE HYDROCHLORIDE, RIBOFLAVIN-5 PHOSPHATE SODIUM, PYRIDOXINE HYDROCHLORIDE, NIACINAMIDE, DEXPANTHENOL, ALPHA-TOCOPHEROL ACETATE, VITAMIN K1, FOLIC ACID, BIOTIN, CYANOCOBALAMIN SCH MLS/HR: 200; 3300; 200; 6; 3.6; 6; 40; 15; 10; 150; 600; 60; 5 INJECTION, SOLUTION INTRAVENOUS at 06:00

## 2019-03-05 RX ADMIN — POLYETHYLENE GLYCOL 3350 SCH GM: 17 POWDER, FOR SOLUTION ORAL at 08:37

## 2019-03-05 RX ADMIN — MORPHINE SULFATE 1 MG: 10 SOLUTION ORAL at 17:31

## 2019-03-05 RX ADMIN — ASCORBIC ACID, VITAMIN A PALMITATE, CHOLECALCIFEROL, THIAMINE HYDROCHLORIDE, RIBOFLAVIN-5 PHOSPHATE SODIUM, PYRIDOXINE HYDROCHLORIDE, NIACINAMIDE, DEXPANTHENOL, ALPHA-TOCOPHEROL ACETATE, VITAMIN K1, FOLIC ACID, BIOTIN, CYANOCOBALAMIN 1 MLS/HR: 200; 3300; 200; 6; 3.6; 6; 40; 15; 10; 150; 600; 60; 5 INJECTION, SOLUTION INTRAVENOUS at 14:00

## 2019-03-05 RX ADMIN — METRONIDAZOLE 1 MLS/HR: 500 SOLUTION INTRAVENOUS at 13:57

## 2019-03-05 RX ADMIN — DIPHENHYDRAMINE HYDROCHLORIDE SCH MG: 50 INJECTION, SOLUTION INTRAMUSCULAR; INTRAVENOUS at 08:36

## 2019-03-05 RX ADMIN — INSULIN ASPART 1 UNIT: 100 INJECTION, SOLUTION INTRAVENOUS; SUBCUTANEOUS at 17:28

## 2019-03-05 RX ADMIN — MORPHINE SULFATE PRN MG: 2 INJECTION, SOLUTION INTRAMUSCULAR; INTRAVENOUS at 01:07

## 2019-03-05 RX ADMIN — MORPHINE SULFATE PRN MG: 10 SOLUTION ORAL at 22:49

## 2019-03-05 NOTE — PN
Date/Time of Note


Date/Time of Note


DATE: 3/5/19 


TIME: 19:08





Assessment/Plan


VTE Prophylaxis


Risk score (from [facility])>0 risk:  3


SCD applied (from [facility]):  Yes


SCD contraindicated:  other


Pharmacological prophylaxis:  other


Pharm contraindication:  low risk/ambulating





Lines/Catheters


IV Catheter Type (from [facility]):  Peripheral IV


Central line still needed:  No


Urinary Cath still in place:  Yes


Reason Cath still needed:  urinary retention





Assessment/Plan


Assessment/Plan


POD #1, Cr normal. SB resection, start clear liquid in AM


Result Diagram:  


3/5/19 0520                                                                     


          3/5/19 0520





Results 24hrs





Laboratory Tests


Test
                     3/4/19
20:57  3/4/19
21:40  3/4/19
22:44  3/5/19
01:06


White Blood Count               9.1  #


Red Blood Count                4.07  L


Hemoglobin                     10.4  L


Hematocrit                     33.6  L


Mean Corpuscular Volume         82.6


Mean Corpuscular               25.6  L


Hemoglobin


Mean Corpuscular              31.0  L
  
             
             



Hemoglobin
Concent


Red Cell Distribution          14.7  H


Width


Platelet Count                 111  #L


Mean Platelet Volume           11.1  H


Immature Granulocytes %       0.900  H


Neutrophils %                  82.5  H


Lymphocytes %                   7.9  L


Monocytes %                      6.8


Eosinophils %                    1.5


Basophils %                      0.4


Nucleated Red Blood              0.0


Cells %


Immature Granulocytes #       0.080  H


Neutrophils #                    7.5


Lymphocytes #                   0.7  L


Monocytes #                      0.6


Eosinophils #                    0.1


Basophils #                      0.0


Nucleated Red Blood              0.0


Cells #


Sodium Level                     138


Potassium Level                 3.1  L


Chloride Level                   109


Carbon Dioxide Level              24


Anion Gap                          5


Blood Urea Nitrogen               14


Creatinine                      0.84


Est Glomerular Filtrat    > 60  
       
             
             



Rate
mL/min


Glucose Level                    120


Calcium Level                   7.2  L


Bedside Glucose                                123           107           122


Test
                     3/5/19
05:20  3/5/19
05:39  3/5/19
08:31  3/5/19
12:11


White Blood Count                8.3


Red Blood Count                 4.47


Hemoglobin                     11.4  L


Hematocrit                      37.3


Mean Corpuscular Volume         83.4


Mean Corpuscular               25.5  L


Hemoglobin


Mean Corpuscular              30.6  L
  
             
             



Hemoglobin
Concent


Red Cell Distribution          14.6  H


Width


Platelet Count                  101  L


Mean Platelet Volume           11.9  H


Immature Granulocytes %       0.700  H


Neutrophils %                  80.6  H


Lymphocytes %                   6.4  L


Monocytes %                     10.9


Eosinophils %                    0.8


Basophils %                      0.6


Nucleated Red Blood              0.0


Cells %


Immature Granulocytes #       0.060  H


Neutrophils #                    6.7


Lymphocytes #                   0.5  L


Monocytes #                      0.9


Eosinophils #                    0.1


Basophils #                      0.1


Nucleated Red Blood              0.0


Cells #


Sodium Level                     141


Potassium Level                  3.6


Chloride Level                   106


Carbon Dioxide Level              27


Anion Gap                          8


Blood Urea Nitrogen               12


Creatinine                      0.98


Est Glomerular Filtrat          58  L
  
             
             



Rate
mL/min


Glucose Level                    169


Calcium Level                   7.4  L


Total Bilirubin                 0.1  L


Direct Bilirubin                0.00


Indirect Bilirubin               0.1


Aspartate Amino                  18  
  
             
             



Transf
(AST/SGOT)


Alanine                          17  
  
             
             



Aminotransferase
(ALT/SG


PT)


Alkaline Phosphatase              70


Total Protein                   5.5  L


Albumin                         2.6  L


Globulin                        2.90


Albumin/Globulin Ratio          0.89


Bedside Glucose                                164           161           174


Test
                     3/5/19
17:27  
             
             



Bedside Glucose                  188








Subjective


24 Hr Interval Summary


Free Text/Dictation


Feel better





Exam/Review of Systems


Exam


Vitals





Vital Signs


  Date      Temp  Pulse  Resp  B/P (MAP)   Pulse Ox  O2          O2 Flow    FiO2


Time                                                 Delivery    Rate


    3/5/19  98.2     89    18      181/80        97


     14:00                          (113)


    3/5/19                                           Nasal


     08:00                                           Cannula


    3/4/19                                                             3.0


     22:30








Intake and Output





3/4/19


3/4/19


3/5/19





1515:00


23:00


07:00





IntakeIntake Total


450 ml


1420 ml


950 ml





OutputOutput Total


1510 ml


1280 ml





BalanceBalance


450 ml


-90 ml


-330 ml














Results


Results 24hrs





Laboratory Tests


Test
                     3/4/19
20:57  3/4/19
21:40  3/4/19
22:44  3/5/19
01:06


White Blood Count               9.1  #


Red Blood Count                4.07  L


Hemoglobin                     10.4  L


Hematocrit                     33.6  L


Mean Corpuscular Volume         82.6


Mean Corpuscular               25.6  L


Hemoglobin


Mean Corpuscular              31.0  L
  
             
             



Hemoglobin
Concent


Red Cell Distribution          14.7  H


Width


Platelet Count                 111  #L


Mean Platelet Volume           11.1  H


Immature Granulocytes %       0.900  H


Neutrophils %                  82.5  H


Lymphocytes %                   7.9  L


Monocytes %                      6.8


Eosinophils %                    1.5


Basophils %                      0.4


Nucleated Red Blood              0.0


Cells %


Immature Granulocytes #       0.080  H


Neutrophils #                    7.5


Lymphocytes #                   0.7  L


Monocytes #                      0.6


Eosinophils #                    0.1


Basophils #                      0.0


Nucleated Red Blood              0.0


Cells #


Sodium Level                     138


Potassium Level                 3.1  L


Chloride Level                   109


Carbon Dioxide Level              24


Anion Gap                          5


Blood Urea Nitrogen               14


Creatinine                      0.84


Est Glomerular Filtrat    > 60  
       
             
             



Rate
mL/min


Glucose Level                    120


Calcium Level                   7.2  L


Bedside Glucose                                123           107           122


Test
                     3/5/19
05:20  3/5/19
05:39  3/5/19
08:31  3/5/19
12:11


White Blood Count                8.3


Red Blood Count                 4.47


Hemoglobin                     11.4  L


Hematocrit                      37.3


Mean Corpuscular Volume         83.4


Mean Corpuscular               25.5  L


Hemoglobin


Mean Corpuscular              30.6  L
  
             
             



Hemoglobin
Concent


Red Cell Distribution          14.6  H


Width


Platelet Count                  101  L


Mean Platelet Volume           11.9  H


Immature Granulocytes %       0.700  H


Neutrophils %                  80.6  H


Lymphocytes %                   6.4  L


Monocytes %                     10.9


Eosinophils %                    0.8


Basophils %                      0.6


Nucleated Red Blood              0.0


Cells %


Immature Granulocytes #       0.060  H


Neutrophils #                    6.7


Lymphocytes #                   0.5  L


Monocytes #                      0.9


Eosinophils #                    0.1


Basophils #                      0.1


Nucleated Red Blood              0.0


Cells #


Sodium Level                     141


Potassium Level                  3.6


Chloride Level                   106


Carbon Dioxide Level              27


Anion Gap                          8


Blood Urea Nitrogen               12


Creatinine                      0.98


Est Glomerular Filtrat          58  L
  
             
             



Rate
mL/min


Glucose Level                    169


Calcium Level                   7.4  L


Total Bilirubin                 0.1  L


Direct Bilirubin                0.00


Indirect Bilirubin               0.1


Aspartate Amino                  18  
  
             
             



Transf
(AST/SGOT)


Alanine                          17  
  
             
             



Aminotransferase
(ALT/SG


PT)


Alkaline Phosphatase              70


Total Protein                   5.5  L


Albumin                         2.6  L


Globulin                        2.90


Albumin/Globulin Ratio          0.89


Bedside Glucose                                164           161           174


Test
                     3/5/19
17:27  
             
             



Bedside Glucose                  188








Medications


Medication





Current Medications


IV Flush (NS 3 ml) 3 ml PER PROTOCOL IV ;  Start 2/26/19 at 11:00


IV Flush (NS 3 ml) 3 ml PER PROTOCOL IV ;  Start 2/26/19 at 11:00


Acetaminophen (Tylenol Tab) 650 mg Q6H  PRN PO .PAIN 1-3 OR TEMP;  Start 2/26/19


at 11:00


Docusate Sodium (Colace) 100 mg Q12H  PRN PO .CONSTIPATION Last administered on 


2/27/19at 18:16; Admin Dose 100 MG;  Start 2/26/19 at 11:00


Zolpidem Tartrate (Ambien) 5 mg QHS  PRN PO .INSOMNIA;  Start 2/26/19 at 11:00


Carvedilol (Coreg) 3.125 mg BID PO  Last administered on 3/5/19at 08:47; Admin 


Dose 3.125 MG;  Start 2/26/19 at 11:30


Diagnostic Test (Pha) (Accu-Chek) 1 ea 02 XX ;  Start 2/27/19 at 02:00


Miscellaneous Information 1 ea NOTE XX ;  Start 2/26/19 at 12:30


Glucose (Glutose) 15 gm Q15M  PRN PO DECREASED GLUCOSE;  Start 2/26/19 at 12:30


Glucose (Glutose) 22.5 gm Q15M  PRN PO DECREASED GLUCOSE;  Start 2/26/19 at 


12:30


Dextrose (D50w Syringe) 25 ml Q15M  PRN IV DECREASED GLUCOSE;  Start 2/26/19 at 


12:30


Dextrose (D50w Syringe) 50 ml Q15M  PRN IV DECREASED GLUCOSE;  Start 2/26/19 at 


12:30


Glucagon (Glucagen) 1 mg Q15M  PRN IM DECREASED GLUCOSE;  Start 2/26/19 at 12:30


Glucose (Glutose) 15 gm Q15M  PRN BUCCAL DECREASED GLUCOSE;  Start 2/26/19 at 


12:30


Polyethylene Glycol (Miralax) 17 gm BID PO  Last administered on 3/5/19at 08:37;


Admin Dose 17 GM;  Start 2/27/19 at 09:00


Clonidine (Catapres) 0.1 mg Q4H  PRN PO ELEVATED SYSTOLIC BP Last administered 


on 2/28/19at 20:40; Admin Dose 0.1 MG;  Start 2/28/19 at 15:30


Levalbuterol (Xopenex Neb) 1.25 mg PACU ORDER PRN HHN .WHEEZING;  Start 3/1/19 


at 12:30


Morphine Sulfate (morphine) 6 mg Q4H  PRN PO SEVERE PAIN LEVEL 7-10 Last 


administered on 3/5/19at 17:31; Admin Dose 6 MG;  Start 3/4/19 at 16:00


Insulin Aspart (Novolog Insulin Pen) (Adult SC Insulin - Mild Algorithm)... Q4 


SC  Last administered on 3/5/19at 17:28; Admin Dose 2 UNIT;  Start 3/4/19 at 


21:00


Cefazolin Sodium/ Dextrose 50 ml @  100 mls/hr Q8H IVPB  Last administered on 


3/5/19at 13:04; Admin Dose 100 MLS/HR;  Start 3/4/19 at 22:00;  Stop 3/5/19 at 


21:59


Metronidazole 100 ml @  100 mls/hr Q8H IVPB  Last administered on 3/5/19at 


13:57; Admin Dose 100 MLS/HR;  Start 3/4/19 at 22:00;  Stop 3/5/19 at 21:59


Morphine Sulfate (morphine) 2 mg Q2H  PRN IV PAIN LEVEL 6-10 Last administered 


on 3/5/19at 18:18; Admin Dose 2 MG;  Start 3/4/19 at 21:00


Acetaminophen/ Hydrocodone Bitart (Norco (5/325)) 1 tab Q6H  PRN PO PAIN LEVEL 


6-10;  Start 3/4/19 at 21:00


Acetaminophen/ Hydrocodone Bitart (Norco (10/325)) 1 tab Q6H  PRN PO PAIN LEVEL 


6-10 Last administered on 3/5/19at 08:37; Admin Dose 1 TAB;  Start 3/4/19 at 


21:00


Diphenhydramine HCl (Benadryl) 25 mg Q6H  PRN IV ITCHING;  Start 3/4/19 at 21:00


Ondansetron HCl (Zofran Inj) 4 mg Q6H  PRN IV NAUSEA AND/OR VOMITING Last 


administered on 3/4/19at 23:48; Admin Dose 4 MG;  Start 3/4/19 at 21:00


Famotidine (Pepcid Iv) 20 mg DAILY IV  Last administered on 3/5/19at 08:36; 


Admin Dose 20 MG;  Start 3/5/19 at 09:00


Potassium Chloride/Dextrose/ Sod Cl 1,000 ml @  125 mls/hr Q8H IV  Last 


administered on 3/5/19at 09:29; Admin Dose 125 MLS/HR;  Start 3/4/19 at 22:00


Enoxaparin Sodium (Lovenox) 40 mg DAILY@07 SC ;  Start 3/6/19 at 07:00











KRISH GUAJARDO MD                Mar 5, 2019 19:09

## 2019-03-05 NOTE — CONS
Assessment/Plan


Assessment/Plan


Assessment/Plan (Daily)


1. Acute kidney injury due to possible prerenal azotemia + possibely obstruction


from mass


2.  H/o Uterine CA s/p hysterectomy, now concerned about recurrence of tumour 


3. pelvis mass, pssible recurrence 


4. Bilateral Hydronephrosis Cystoscopy and insertion of bilateral ureteral JJ 


stents on 3/1/19 by Dr. Han  


5. H/O HTN


6. H/o DM II





Plan:


Cystoscopy and insertion of bilateral ureteral JJ stents on 3/1/19 by Dr. Han, BUN/Cr improved to 12/0.98 - Other electrolytes stable 


s/p GYN oncology consult -s/p Exp Lap, Resection of pelvic tumour and Small 


bowel on 3/4/18- doing well, plan is to start clear liquid in AM


IVF NS at 75 cc/hr


will follow up





Consultation Date/Type/Reason


Admit Date/Time


Feb 26, 2019 at 10:49


Initial Consult Date


2/26/19


Type of Consult


NEPHROLOGY


Requesting Provider:  JOY RAMIREZ MD


Date/Time of Note


DATE: 3/5/19 


TIME: 18:29





Exam/Review of Systems


Exam


Vitals





Vital Signs


  Date      Temp  Pulse  Resp  B/P (MAP)   Pulse Ox  O2          O2 Flow    FiO2


Time                                                 Delivery    Rate


    3/5/19  98.2     89    18      181/80        97


     14:00                          (113)


    3/5/19                                           Nasal


     08:00                                           Cannula


    3/4/19                                                             3.0


     22:30








Intake and Output





3/4/19


3/4/19


3/5/19





1515:00


23:00


07:00





IntakeIntake Total


450 ml


1420 ml


950 ml





OutputOutput Total


1510 ml


1280 ml





BalanceBalance


450 ml


-90 ml


-330 ml











Exam


Constitutional:  alert


Respiratory:  clear to auscultation, normal air movement


Cardiovascular:  regular rate and rhythm, nl pulses


Gastrointestinal:  soft, non-tender


Musculoskeletal:  nl extremities to inspection


Extremities:  normal pulses


Neurological:  CNS II-XII intact, nl mental status, nl speech, nl strength





Results


Result Diagram:  


3/5/19 0520                                                                     


          3/5/19 0520





Results 24hrs





Laboratory Tests


Test
                     3/4/19
20:57  3/4/19
21:40  3/4/19
22:44  3/5/19
01:06


White Blood Count               9.1  #


Red Blood Count                4.07  L


Hemoglobin                     10.4  L


Hematocrit                     33.6  L


Mean Corpuscular Volume         82.6


Mean Corpuscular               25.6  L


Hemoglobin


Mean Corpuscular              31.0  L
  
             
             



Hemoglobin
Concent


Red Cell Distribution          14.7  H


Width


Platelet Count                 111  #L


Mean Platelet Volume           11.1  H


Immature Granulocytes %       0.900  H


Neutrophils %                  82.5  H


Lymphocytes %                   7.9  L


Monocytes %                      6.8


Eosinophils %                    1.5


Basophils %                      0.4


Nucleated Red Blood              0.0


Cells %


Immature Granulocytes #       0.080  H


Neutrophils #                    7.5


Lymphocytes #                   0.7  L


Monocytes #                      0.6


Eosinophils #                    0.1


Basophils #                      0.0


Nucleated Red Blood              0.0


Cells #


Sodium Level                     138


Potassium Level                 3.1  L


Chloride Level                   109


Carbon Dioxide Level              24


Anion Gap                          5


Blood Urea Nitrogen               14


Creatinine                      0.84


Est Glomerular Filtrat    > 60  
       
             
             



Rate
mL/min


Glucose Level                    120


Calcium Level                   7.2  L


Bedside Glucose                                123           107           122


Test
                     3/5/19
05:20  3/5/19
05:39  3/5/19
08:31  3/5/19
12:11


White Blood Count                8.3


Red Blood Count                 4.47


Hemoglobin                     11.4  L


Hematocrit                      37.3


Mean Corpuscular Volume         83.4


Mean Corpuscular               25.5  L


Hemoglobin


Mean Corpuscular              30.6  L
  
             
             



Hemoglobin
Concent


Red Cell Distribution          14.6  H


Width


Platelet Count                  101  L


Mean Platelet Volume           11.9  H


Immature Granulocytes %       0.700  H


Neutrophils %                  80.6  H


Lymphocytes %                   6.4  L


Monocytes %                     10.9


Eosinophils %                    0.8


Basophils %                      0.6


Nucleated Red Blood              0.0


Cells %


Immature Granulocytes #       0.060  H


Neutrophils #                    6.7


Lymphocytes #                   0.5  L


Monocytes #                      0.9


Eosinophils #                    0.1


Basophils #                      0.1


Nucleated Red Blood              0.0


Cells #


Sodium Level                     141


Potassium Level                  3.6


Chloride Level                   106


Carbon Dioxide Level              27


Anion Gap                          8


Blood Urea Nitrogen               12


Creatinine                      0.98


Est Glomerular Filtrat          58  L
  
             
             



Rate
mL/min


Glucose Level                    169


Calcium Level                   7.4  L


Total Bilirubin                 0.1  L


Direct Bilirubin                0.00


Indirect Bilirubin               0.1


Aspartate Amino                  18  
  
             
             



Transf
(AST/SGOT)


Alanine                          17  
  
             
             



Aminotransferase
(ALT/SG


PT)


Alkaline Phosphatase              70


Total Protein                   5.5  L


Albumin                         2.6  L


Globulin                        2.90


Albumin/Globulin Ratio          0.89


Bedside Glucose                                164           161           174


Test
                     3/5/19
17:27  
             
             



Bedside Glucose                  188








Medications


Medication





Current Medications


IV Flush (NS 3 ml) 3 ml PER PROTOCOL IV ;  Start 2/26/19 at 11:00


IV Flush (NS 3 ml) 3 ml PER PROTOCOL IV ;  Start 2/26/19 at 11:00


Acetaminophen (Tylenol Tab) 650 mg Q6H  PRN PO .PAIN 1-3 OR TEMP;  Start 2/26/19


at 11:00


Docusate Sodium (Colace) 100 mg Q12H  PRN PO .CONSTIPATION Last administered on 


2/27/19at 18:16; Admin Dose 100 MG;  Start 2/26/19 at 11:00


Zolpidem Tartrate (Ambien) 5 mg QHS  PRN PO .INSOMNIA;  Start 2/26/19 at 11:00


Carvedilol (Coreg) 3.125 mg BID PO  Last administered on 3/5/19at 08:47; Admin 


Dose 3.125 MG;  Start 2/26/19 at 11:30


Diagnostic Test (Pha) (Accu-Chek) 1 ea 02 XX ;  Start 2/27/19 at 02:00


Miscellaneous Information 1 ea NOTE XX ;  Start 2/26/19 at 12:30


Glucose (Glutose) 15 gm Q15M  PRN PO DECREASED GLUCOSE;  Start 2/26/19 at 12:30


Glucose (Glutose) 22.5 gm Q15M  PRN PO DECREASED GLUCOSE;  Start 2/26/19 at 


12:30


Dextrose (D50w Syringe) 25 ml Q15M  PRN IV DECREASED GLUCOSE;  Start 2/26/19 at 


12:30


Dextrose (D50w Syringe) 50 ml Q15M  PRN IV DECREASED GLUCOSE;  Start 2/26/19 at 


12:30


Glucagon (Glucagen) 1 mg Q15M  PRN IM DECREASED GLUCOSE;  Start 2/26/19 at 12:30


Glucose (Glutose) 15 gm Q15M  PRN BUCCAL DECREASED GLUCOSE;  Start 2/26/19 at 


12:30


Polyethylene Glycol (Miralax) 17 gm BID PO  Last administered on 3/5/19at 08:37;


Admin Dose 17 GM;  Start 2/27/19 at 09:00


Clonidine (Catapres) 0.1 mg Q4H  PRN PO ELEVATED SYSTOLIC BP Last administered 


on 2/28/19at 20:40; Admin Dose 0.1 MG;  Start 2/28/19 at 15:30


Levalbuterol (Xopenex Neb) 1.25 mg PACU ORDER PRN HHN .WHEEZING;  Start 3/1/19 


at 12:30


Morphine Sulfate (morphine) 6 mg Q4H  PRN PO SEVERE PAIN LEVEL 7-10 Last 


administered on 3/5/19at 17:31; Admin Dose 6 MG;  Start 3/4/19 at 16:00


Insulin Aspart (Novolog Insulin Pen) (Adult SC Insulin - Mild Algorithm)... Q4 


SC  Last administered on 3/5/19at 17:28; Admin Dose 2 UNIT;  Start 3/4/19 at 


21:00


Cefazolin Sodium/ Dextrose 50 ml @  100 mls/hr Q8H IVPB  Last administered on 


3/5/19at 13:04; Admin Dose 100 MLS/HR;  Start 3/4/19 at 22:00;  Stop 3/5/19 at 


21:59


Metronidazole 100 ml @  100 mls/hr Q8H IVPB  Last administered on 3/5/19at 


13:57; Admin Dose 100 MLS/HR;  Start 3/4/19 at 22:00;  Stop 3/5/19 at 21:59


Morphine Sulfate (morphine) 2 mg Q2H  PRN IV PAIN LEVEL 6-10 Last administered 


on 3/5/19at 18:18; Admin Dose 2 MG;  Start 3/4/19 at 21:00


Acetaminophen/ Hydrocodone Bitart (Norco (5/325)) 1 tab Q6H  PRN PO PAIN LEVEL 


6-10;  Start 3/4/19 at 21:00


Acetaminophen/ Hydrocodone Bitart (Norco (10/325)) 1 tab Q6H  PRN PO PAIN LEVEL 


6-10 Last administered on 3/5/19at 08:37; Admin Dose 1 TAB;  Start 3/4/19 at 21:


00


Diphenhydramine HCl (Benadryl) 25 mg Q6H  PRN IV ITCHING;  Start 3/4/19 at 21:00


Ondansetron HCl (Zofran Inj) 4 mg Q6H  PRN IV NAUSEA AND/OR VOMITING Last 


administered on 3/4/19at 23:48; Admin Dose 4 MG;  Start 3/4/19 at 21:00


Famotidine (Pepcid Iv) 20 mg DAILY IV  Last administered on 3/5/19at 08:36; 


Admin Dose 20 MG;  Start 3/5/19 at 09:00


Potassium Chloride/Dextrose/ Sod Cl 1,000 ml @  125 mls/hr Q8H IV  Last 


administered on 3/5/19at 09:29; Admin Dose 125 MLS/HR;  Start 3/4/19 at 22:00


Enoxaparin Sodium (Lovenox) 40 mg DAILY@07 SC ;  Start 3/6/19 at 07:00











MARGIE GODINEZ MD            Mar 5, 2019 18:29

## 2019-03-05 NOTE — PN
Date/Time of Note


Date/Time of Note


DATE: 3/5/19 


TIME: 09:34





Subjective


Patient is alert and responsive.  Pain is well controlled.  Daughter at the 


bedside





Objective


Vitals





Vital Signs


  Date      Temp  Pulse  Resp  B/P (MAP)   Pulse Ox  O2          O2 Flow    FiO2


Time                                                 Delivery    Rate


    3/5/19  98.5     98    20      146/70        91  Nasal


     08:00                           (95)            Cannula


    3/4/19                                                             3.0


     22:30








Intake and Output





3/4/19


3/4/19


3/5/19





1414:59


22:59


06:59





IntakeIntake Total


450 ml


1420 ml


950 ml





OutputOutput Total


1510 ml


1280 ml





BalanceBalance


450 ml


-90 ml


-330 ml











Lungs clear to auscultation bilaterally


Cardiac regular rate and rhythm


Abdomen with hypoactive bowel sounds.  Diffusely tender.  Clean dressing.


Extremities no clubbing cyanosis or edema


Neurological nonfocal





Results


Result Diagram:  


3/5/19 0520                                                                     


          3/5/19 0520








Medications


Medications





Current Medications


IV Flush (NS 3 ml) 3 ml PER PROTOCOL IV ;  Start 2/26/19 at 11:00


IV Flush (NS 3 ml) 3 ml PER PROTOCOL IV ;  Start 2/26/19 at 11:00


Acetaminophen (Tylenol Tab) 650 mg Q6H  PRN PO .PAIN 1-3 OR TEMP;  Start 2/26/19


at 11:00


Docusate Sodium (Colace) 100 mg Q12H  PRN PO .CONSTIPATION Last administered on 


2/27/19at 18:16; Admin Dose 100 MG;  Start 2/26/19 at 11:00


Zolpidem Tartrate (Ambien) 5 mg QHS  PRN PO .INSOMNIA;  Start 2/26/19 at 11:00


Carvedilol (Coreg) 3.125 mg BID PO  Last administered on 3/5/19at 08:47; Admin 


Dose 3.125 MG;  Start 2/26/19 at 11:30


Diagnostic Test (Pha) (Accu-Chek) 1 ea 02 XX ;  Start 2/27/19 at 02:00


Miscellaneous Information 1 ea NOTE XX ;  Start 2/26/19 at 12:30


Glucose (Glutose) 15 gm Q15M  PRN PO DECREASED GLUCOSE;  Start 2/26/19 at 12:30


Glucose (Glutose) 22.5 gm Q15M  PRN PO DECREASED GLUCOSE;  Start 2/26/19 at 


12:30


Dextrose (D50w Syringe) 25 ml Q15M  PRN IV DECREASED GLUCOSE;  Start 2/26/19 at 


12:30


Dextrose (D50w Syringe) 50 ml Q15M  PRN IV DECREASED GLUCOSE;  Start 2/26/19 at 


12:30


Glucagon (Glucagen) 1 mg Q15M  PRN IM DECREASED GLUCOSE;  Start 2/26/19 at 12:30


Glucose (Glutose) 15 gm Q15M  PRN BUCCAL DECREASED GLUCOSE;  Start 2/26/19 at 


12:30


Polyethylene Glycol (Miralax) 17 gm BID PO  Last administered on 3/5/19at 08:37;


Admin Dose 17 GM;  Start 2/27/19 at 09:00


Clonidine (Catapres) 0.1 mg Q4H  PRN PO ELEVATED SYSTOLIC BP Last administered 


on 2/28/19at 20:40; Admin Dose 0.1 MG;  Start 2/28/19 at 15:30


Levalbuterol (Xopenex Neb) 1.25 mg PACU ORDER PRN HHN .WHEEZING;  Start 3/1/19 


at 12:30


Morphine Sulfate (morphine) 6 mg Q4H  PRN PO SEVERE PAIN LEVEL 7-10;  Start 


3/4/19 at 16:00


Insulin Aspart (Novolog Insulin Pen) (Adult SC Insulin - Mild Algorithm)... Q4 


SC  Last administered on 3/5/19at 08:40; Admin Dose 1 UNIT;  Start 3/4/19 at 


21:00


Cefazolin Sodium/ Dextrose 50 ml @  100 mls/hr Q8H IVPB  Last administered on 


3/5/19at 06:20; Admin Dose 100 MLS/HR;  Start 3/4/19 at 22:00;  Stop 3/5/19 at 


21:59


Metronidazole 100 ml @  100 mls/hr Q8H IVPB  Last administered on 3/5/19at 


05:43; Admin Dose 100 MLS/HR;  Start 3/4/19 at 22:00;  Stop 3/5/19 at 21:59


Morphine Sulfate (morphine) 2 mg Q2H  PRN IV PAIN LEVEL 6-10 Last administered 


on 3/5/19at 09:27; Admin Dose 2 MG;  Start 3/4/19 at 21:00


Acetaminophen/ Hydrocodone Bitart (Norco (5/325)) 1 tab Q6H  PRN PO PAIN LEVEL 


6-10;  Start 3/4/19 at 21:00


Acetaminophen/ Hydrocodone Bitart (Norco (10/325)) 1 tab Q6H  PRN PO PAIN LEVEL 


6-10 Last administered on 3/5/19at 08:37; Admin Dose 1 TAB;  Start 3/4/19 at 


21:00


Diphenhydramine HCl (Benadryl) 25 mg Q6H  PRN IV ITCHING;  Start 3/4/19 at 21:00


Ondansetron HCl (Zofran Inj) 4 mg Q6H  PRN IV NAUSEA AND/OR VOMITING Last 


administered on 3/4/19at 23:48; Admin Dose 4 MG;  Start 3/4/19 at 21:00


Famotidine (Pepcid Iv) 20 mg DAILY IV  Last administered on 3/5/19at 08:36; 


Admin Dose 20 MG;  Start 3/5/19 at 09:00


Potassium Chloride/Dextrose/ Sod Cl 1,000 ml @  125 mls/hr Q8H IV  Last 


administered on 3/5/19at 09:29; Admin Dose 125 MLS/HR;  Start 3/4/19 at 22:00


Enoxaparin Sodium (Lovenox) 40 mg DAILY@07 SC ;  Start 3/6/19 at 07:00





VTE Prophylaxis


Risk score (from Nsg)>0 risk:  1


SCD applied (from Nsg):  Yes





Lines/Catheters


IV Catheter Type:  Saline Lock


Carbajal in Place:  Yes


Cont'd carbajal catheter reason:  urinary retention, other (indicate)





Assessment/Plan


Assessment/Plan


58-year-old female with recurrent ovarian cancer


Postop day #1 pelvic mass resection, enterolysis, and partial small bowel 


resection


Acute kidney injury, resolved


Status post bilateral ureteral stent placements





Continue postop care


Ambulate with PT


Gynecology oncology and nephrology follow-up


Plan of care was discussed with patient and her daughter











JOY RAMIREZ MD                   Mar 5, 2019 09:36

## 2019-03-06 VITALS — RESPIRATION RATE: 20 BRPM | DIASTOLIC BLOOD PRESSURE: 76 MMHG | SYSTOLIC BLOOD PRESSURE: 154 MMHG | HEART RATE: 90 BPM

## 2019-03-06 VITALS — SYSTOLIC BLOOD PRESSURE: 174 MMHG | HEART RATE: 88 BPM | RESPIRATION RATE: 19 BRPM | DIASTOLIC BLOOD PRESSURE: 76 MMHG

## 2019-03-06 VITALS — RESPIRATION RATE: 16 BRPM | HEART RATE: 88 BPM | SYSTOLIC BLOOD PRESSURE: 132 MMHG | DIASTOLIC BLOOD PRESSURE: 76 MMHG

## 2019-03-06 VITALS — DIASTOLIC BLOOD PRESSURE: 78 MMHG | RESPIRATION RATE: 18 BRPM | HEART RATE: 92 BPM | SYSTOLIC BLOOD PRESSURE: 163 MMHG

## 2019-03-06 RX ADMIN — INSULIN ASPART 1 UNIT: 100 INJECTION, SOLUTION INTRAVENOUS; SUBCUTANEOUS at 01:09

## 2019-03-06 RX ADMIN — ASCORBIC ACID, VITAMIN A PALMITATE, CHOLECALCIFEROL, THIAMINE HYDROCHLORIDE, RIBOFLAVIN-5 PHOSPHATE SODIUM, PYRIDOXINE HYDROCHLORIDE, NIACINAMIDE, DEXPANTHENOL, ALPHA-TOCOPHEROL ACETATE, VITAMIN K1, FOLIC ACID, BIOTIN, CYANOCOBALAMIN 1 MLS/HR: 200; 3300; 200; 6; 3.6; 6; 40; 15; 10; 150; 600; 60; 5 INJECTION, SOLUTION INTRAVENOUS at 22:14

## 2019-03-06 RX ADMIN — MORPHINE SULFATE PRN MG: 2 INJECTION, SOLUTION INTRAMUSCULAR; INTRAVENOUS at 19:36

## 2019-03-06 RX ADMIN — ENOXAPARIN SODIUM 1 MG: 100 INJECTION SUBCUTANEOUS at 06:09

## 2019-03-06 RX ADMIN — MORPHINE SULFATE 1 MG: 2 INJECTION, SOLUTION INTRAMUSCULAR; INTRAVENOUS at 14:08

## 2019-03-06 RX ADMIN — ASCORBIC ACID, VITAMIN A PALMITATE, CHOLECALCIFEROL, THIAMINE HYDROCHLORIDE, RIBOFLAVIN-5 PHOSPHATE SODIUM, PYRIDOXINE HYDROCHLORIDE, NIACINAMIDE, DEXPANTHENOL, ALPHA-TOCOPHEROL ACETATE, VITAMIN K1, FOLIC ACID, BIOTIN, CYANOCOBALAMIN 1 MLS/HR: 200; 3300; 200; 6; 3.6; 6; 40; 15; 10; 150; 600; 60; 5 INJECTION, SOLUTION INTRAVENOUS at 14:32

## 2019-03-06 RX ADMIN — ASCORBIC ACID, VITAMIN A PALMITATE, CHOLECALCIFEROL, THIAMINE HYDROCHLORIDE, RIBOFLAVIN-5 PHOSPHATE SODIUM, PYRIDOXINE HYDROCHLORIDE, NIACINAMIDE, DEXPANTHENOL, ALPHA-TOCOPHEROL ACETATE, VITAMIN K1, FOLIC ACID, BIOTIN, CYANOCOBALAMIN 1 MLS/HR: 200; 3300; 200; 6; 3.6; 6; 40; 15; 10; 150; 600; 60; 5 INJECTION, SOLUTION INTRAVENOUS at 05:01

## 2019-03-06 RX ADMIN — POLYETHYLENE GLYCOL 3350 1 GM: 17 POWDER, FOR SOLUTION ORAL at 09:24

## 2019-03-06 RX ADMIN — POLYETHYLENE GLYCOL 3350 SCH GM: 17 POWDER, FOR SOLUTION ORAL at 20:36

## 2019-03-06 RX ADMIN — ASCORBIC ACID, VITAMIN A PALMITATE, CHOLECALCIFEROL, THIAMINE HYDROCHLORIDE, RIBOFLAVIN-5 PHOSPHATE SODIUM, PYRIDOXINE HYDROCHLORIDE, NIACINAMIDE, DEXPANTHENOL, ALPHA-TOCOPHEROL ACETATE, VITAMIN K1, FOLIC ACID, BIOTIN, CYANOCOBALAMIN SCH MLS/HR: 200; 3300; 200; 6; 3.6; 6; 40; 15; 10; 150; 600; 60; 5 INJECTION, SOLUTION INTRAVENOUS at 14:32

## 2019-03-06 RX ADMIN — MORPHINE SULFATE 1 MG: 2 INJECTION, SOLUTION INTRAMUSCULAR; INTRAVENOUS at 12:05

## 2019-03-06 RX ADMIN — MORPHINE SULFATE PRN MG: 10 SOLUTION ORAL at 03:13

## 2019-03-06 RX ADMIN — MORPHINE SULFATE 1 MG: 10 SOLUTION ORAL at 03:13

## 2019-03-06 RX ADMIN — MORPHINE SULFATE 1 MG: 2 INJECTION, SOLUTION INTRAMUSCULAR; INTRAVENOUS at 01:07

## 2019-03-06 RX ADMIN — FAMOTIDINE SCH MG: 20 TABLET ORAL at 20:37

## 2019-03-06 RX ADMIN — MORPHINE SULFATE 1 MG: 2 INJECTION, SOLUTION INTRAMUSCULAR; INTRAVENOUS at 19:36

## 2019-03-06 RX ADMIN — MORPHINE SULFATE PRN MG: 2 INJECTION, SOLUTION INTRAMUSCULAR; INTRAVENOUS at 14:08

## 2019-03-06 RX ADMIN — FAMOTIDINE 1 MG: 10 INJECTION, SOLUTION INTRAVENOUS at 09:25

## 2019-03-06 RX ADMIN — INSULIN ASPART 1 UNIT: 100 INJECTION, SOLUTION INTRAVENOUS; SUBCUTANEOUS at 05:01

## 2019-03-06 RX ADMIN — INSULIN ASPART 1 UNIT: 100 INJECTION, SOLUTION INTRAVENOUS; SUBCUTANEOUS at 20:43

## 2019-03-06 RX ADMIN — ENOXAPARIN SODIUM SCH MG: 100 INJECTION SUBCUTANEOUS at 06:09

## 2019-03-06 RX ADMIN — POLYETHYLENE GLYCOL 3350 1 GM: 17 POWDER, FOR SOLUTION ORAL at 20:36

## 2019-03-06 RX ADMIN — INSULIN ASPART 1 UNIT: 100 INJECTION, SOLUTION INTRAVENOUS; SUBCUTANEOUS at 17:00

## 2019-03-06 RX ADMIN — MORPHINE SULFATE 1 MG: 2 INJECTION, SOLUTION INTRAMUSCULAR; INTRAVENOUS at 09:18

## 2019-03-06 RX ADMIN — MORPHINE SULFATE PRN MG: 2 INJECTION, SOLUTION INTRAMUSCULAR; INTRAVENOUS at 01:07

## 2019-03-06 RX ADMIN — MORPHINE SULFATE 1 MG: 10 SOLUTION ORAL at 16:29

## 2019-03-06 RX ADMIN — FAMOTIDINE 1 MG: 20 TABLET ORAL at 20:37

## 2019-03-06 RX ADMIN — MORPHINE SULFATE PRN MG: 2 INJECTION, SOLUTION INTRAMUSCULAR; INTRAVENOUS at 05:02

## 2019-03-06 RX ADMIN — INSULIN ASPART 1 UNIT: 100 INJECTION, SOLUTION INTRAVENOUS; SUBCUTANEOUS at 13:00

## 2019-03-06 RX ADMIN — DIPHENHYDRAMINE HYDROCHLORIDE SCH MG: 50 INJECTION, SOLUTION INTRAMUSCULAR; INTRAVENOUS at 09:25

## 2019-03-06 RX ADMIN — MORPHINE SULFATE 1 MG: 2 INJECTION, SOLUTION INTRAMUSCULAR; INTRAVENOUS at 05:02

## 2019-03-06 RX ADMIN — MORPHINE SULFATE PRN MG: 2 INJECTION, SOLUTION INTRAMUSCULAR; INTRAVENOUS at 12:05

## 2019-03-06 RX ADMIN — ASCORBIC ACID, VITAMIN A PALMITATE, CHOLECALCIFEROL, THIAMINE HYDROCHLORIDE, RIBOFLAVIN-5 PHOSPHATE SODIUM, PYRIDOXINE HYDROCHLORIDE, NIACINAMIDE, DEXPANTHENOL, ALPHA-TOCOPHEROL ACETATE, VITAMIN K1, FOLIC ACID, BIOTIN, CYANOCOBALAMIN SCH MLS/HR: 200; 3300; 200; 6; 3.6; 6; 40; 15; 10; 150; 600; 60; 5 INJECTION, SOLUTION INTRAVENOUS at 05:01

## 2019-03-06 RX ADMIN — MORPHINE SULFATE PRN MG: 2 INJECTION, SOLUTION INTRAMUSCULAR; INTRAVENOUS at 09:18

## 2019-03-06 RX ADMIN — MORPHINE SULFATE PRN MG: 10 SOLUTION ORAL at 16:29

## 2019-03-06 RX ADMIN — ASCORBIC ACID, VITAMIN A PALMITATE, CHOLECALCIFEROL, THIAMINE HYDROCHLORIDE, RIBOFLAVIN-5 PHOSPHATE SODIUM, PYRIDOXINE HYDROCHLORIDE, NIACINAMIDE, DEXPANTHENOL, ALPHA-TOCOPHEROL ACETATE, VITAMIN K1, FOLIC ACID, BIOTIN, CYANOCOBALAMIN SCH MLS/HR: 200; 3300; 200; 6; 3.6; 6; 40; 15; 10; 150; 600; 60; 5 INJECTION, SOLUTION INTRAVENOUS at 22:14

## 2019-03-06 RX ADMIN — POLYETHYLENE GLYCOL 3350 SCH GM: 17 POWDER, FOR SOLUTION ORAL at 09:24

## 2019-03-06 RX ADMIN — INSULIN ASPART 1 UNIT: 100 INJECTION, SOLUTION INTRAVENOUS; SUBCUTANEOUS at 09:00

## 2019-03-06 NOTE — PN
Date/Time of Note


Date/Time of Note


DATE: 3/6/19 


TIME: 11:46





Subjective


Patient is alert and comfortable.  Pain is well controlled.  Reports having 


flatus.  Tolerating oral intake.





Objective


Vitals





Vital Signs


  Date      Temp  Pulse  Resp  B/P (MAP)   Pulse Ox  O2          O2 Flow    FiO2


Time                                                 Delivery    Rate


    3/6/19  99.0     92    18      163/78            Nasal


     07:48                          (106)            Cannula


    3/6/19                                       90


     01:42


    3/5/19                                                             3.0


     20:12








Intake and Output





3/5/19


3/5/19


3/6/19





1414:59


22:59


06:59





IntakeIntake Total


1150 ml


1000 ml


1000 ml





OutputOutput Total


20 ml


725 ml





BalanceBalance


1150 ml


980 ml


275 ml











Lungs clear to auscultation bilaterally


Cardiac regular rate and rhythm


Abdomen soft diffusely tender to palpation.  No rebound or guarding.  Hypoactive


bowel sounds


Extremities no clubbing cyanosis or edema


Neurological nonfocal





Results


Result Diagram:  


3/5/19 0520                                                                     


          3/5/19 0520








Medications


Medications





Current Medications


IV Flush (NS 3 ml) 3 ml PER PROTOCOL IV ;  Start 2/26/19 at 11:00


IV Flush (NS 3 ml) 3 ml PER PROTOCOL IV ;  Start 2/26/19 at 11:00


Acetaminophen (Tylenol Tab) 650 mg Q6H  PRN PO .PAIN 1-3 OR TEMP;  Start 2/26/19


at 11:00


Docusate Sodium (Colace) 100 mg Q12H  PRN PO .CONSTIPATION Last administered on 


2/27/19at 18:16; Admin Dose 100 MG;  Start 2/26/19 at 11:00


Zolpidem Tartrate (Ambien) 5 mg QHS  PRN PO .INSOMNIA;  Start 2/26/19 at 11:00


Carvedilol (Coreg) 3.125 mg BID PO  Last administered on 3/6/19at 09:25; Admin 


Dose 3.125 MG;  Start 2/26/19 at 11:30


Diagnostic Test (Pha) (Accu-Chek) 1 ea 02 XX ;  Start 2/27/19 at 02:00


Miscellaneous Information 1 ea NOTE XX ;  Start 2/26/19 at 12:30


Glucose (Glutose) 15 gm Q15M  PRN PO DECREASED GLUCOSE;  Start 2/26/19 at 12:30


Glucose (Glutose) 22.5 gm Q15M  PRN PO DECREASED GLUCOSE;  Start 2/26/19 at 


12:30


Dextrose (D50w Syringe) 25 ml Q15M  PRN IV DECREASED GLUCOSE;  Start 2/26/19 at 


12:30


Dextrose (D50w Syringe) 50 ml Q15M  PRN IV DECREASED GLUCOSE;  Start 2/26/19 at 


12:30


Glucagon (Glucagen) 1 mg Q15M  PRN IM DECREASED GLUCOSE;  Start 2/26/19 at 12:30


Glucose (Glutose) 15 gm Q15M  PRN BUCCAL DECREASED GLUCOSE;  Start 2/26/19 at 


12:30


Polyethylene Glycol (Miralax) 17 gm BID PO  Last administered on 3/6/19at 09:24;


Admin Dose 17 GM;  Start 2/27/19 at 09:00


Clonidine (Catapres) 0.1 mg Q4H  PRN PO ELEVATED SYSTOLIC BP Last administered 


on 2/28/19at 20:40; Admin Dose 0.1 MG;  Start 2/28/19 at 15:30


Levalbuterol (Xopenex Neb) 1.25 mg PACU ORDER PRN HHN .WHEEZING;  Start 3/1/19 


at 12:30


Morphine Sulfate (morphine) 6 mg Q4H  PRN PO SEVERE PAIN LEVEL 7-10 Last 


administered on 3/6/19at 03:13; Admin Dose 6 MG;  Start 3/4/19 at 16:00


Insulin Aspart (Novolog Insulin Pen) (Adult SC Insulin - Mild Algorithm)... Q4 


SC  Last administered on 3/6/19at 05:01; Admin Dose 1 UNIT;  Start 3/4/19 at 


21:00


Morphine Sulfate (morphine) 2 mg Q2H  PRN IV PAIN LEVEL 6-10 Last administered 


on 3/6/19at 09:18; Admin Dose 2 MG;  Start 3/4/19 at 21:00


Acetaminophen/ Hydrocodone Bitart (Norco (5/325)) 1 tab Q6H  PRN PO PAIN LEVEL 


6-10;  Start 3/4/19 at 21:00


Acetaminophen/ Hydrocodone Bitart (Norco (10/325)) 1 tab Q6H  PRN PO PAIN LEVEL 


6-10 Last administered on 3/5/19at 08:37; Admin Dose 1 TAB;  Start 3/4/19 at 


21:00


Diphenhydramine HCl (Benadryl) 25 mg Q6H  PRN IV ITCHING;  Start 3/4/19 at 21:00


Ondansetron HCl (Zofran Inj) 4 mg Q6H  PRN IV NAUSEA AND/OR VOMITING Last 


administered on 3/4/19at 23:48; Admin Dose 4 MG;  Start 3/4/19 at 21:00


Famotidine (Pepcid Iv) 20 mg DAILY IV  Last administered on 3/6/19at 09:25; 


Admin Dose 20 MG;  Start 3/5/19 at 09:00


Potassium Chloride/Dextrose/ Sod Cl 1,000 ml @  125 mls/hr Q8H IV  Last 


administered on 3/6/19at 05:01; Admin Dose 125 MLS/HR;  Start 3/4/19 at 22:00


Enoxaparin Sodium (Lovenox) 40 mg DAILY@07 SC  Last administered on 3/6/19at 


06:09; Admin Dose 40 MG;  Start 3/6/19 at 07:00





VTE Prophylaxis


Risk score (from Ns)>0 risk:  4


SCD applied (from Ns):  Yes





Lines/Catheters


IV Catheter Type:  Saline Lock


Carbajal in Place:  Yes


Cont'd carbajal catheter reason:  urinary retention





Assessment/Plan


Assessment/Plan


58-year-old female with recurrent ovarian cancer


Postop day #2 pelvic mass resection, enterolysis, and partial small bowel rese


ction





Advance diet as tolerated


Ambulate with PT


Gynecology oncology follow-up


Discharge planning in 1-2 days











JOY RAMIREZ MD                   Mar 6, 2019 11:49

## 2019-03-06 NOTE — CONS
Assessment/Plan


Assessment/Plan


Assessment/Plan (Daily)


1. Acute kidney injury due to possible prerenal azotemia + possibely obstruction


from mass


2.  H/o Uterine CA s/p hysterectomy, now concerned about recurrence of tumour 


3. pelvis mass, pssible recurrence 


4. Bilateral Hydronephrosis Cystoscopy and insertion of bilateral ureteral JJ 


stents on 3/1/19 by Dr. Han  


5. H/O HTN


6. H/o DM II





Plan:


Cystoscopy and insertion of bilateral ureteral JJ stents on 3/1/19 by Dr. Han, BUN/Cr improved to 12/0.98 - Other electrolytes stable - no labs today 


to review yet 


s/p GYN oncology consult -s/p Exp Lap, Resection of pelvic tumour and Small 


bowel on 3/4/18


IVF NS at 75 cc/hr


will follow up





Consultation Date/Type/Reason


Admit Date/Time


Feb 26, 2019 at 10:49


Initial Consult Date


2/26/19


Type of Consult


NEPHROLOGY


Requesting Provider:  JOY RAMIREZ MD


Date/Time of Note


DATE: 3/6/19 


TIME: 08:38





Exam/Review of Systems


Exam


Vitals





Vital Signs


  Date      Temp  Pulse  Resp  B/P (MAP)   Pulse Ox  O2          O2 Flow    FiO2


Time                                                 Delivery    Rate


    3/6/19  99.0     92    18      163/78            Nasal


     07:48                          (106)            Cannula


    3/6/19                                       90


     01:42


    3/5/19                                                             3.0


     20:12








Intake and Output





3/5/19


3/5/19


3/6/19





1414:59


22:59


06:59





IntakeIntake Total


1150 ml


1000 ml


1000 ml





OutputOutput Total


20 ml


725 ml





BalanceBalance


1150 ml


980 ml


275 ml














Results


Result Diagram:  


3/5/19 0520                                                                     


          3/5/19 0520





Results 24hrs





Laboratory Tests


    Test
            3/5/19
12:11  3/5/19
17:27  3/5/19
20:46  3/6/19
01:05


    Bedside Glucose         174           188           146           173


    Test
            3/6/19
04:56  
             
             



    Bedside Glucose         153








Medications


Medication





Current Medications


IV Flush (NS 3 ml) 3 ml PER PROTOCOL IV ;  Start 2/26/19 at 11:00


IV Flush (NS 3 ml) 3 ml PER PROTOCOL IV ;  Start 2/26/19 at 11:00


Acetaminophen (Tylenol Tab) 650 mg Q6H  PRN PO .PAIN 1-3 OR TEMP;  Start 2/26/19


at 11:00


Docusate Sodium (Colace) 100 mg Q12H  PRN PO .CONSTIPATION Last administered on 


2/27/19at 18:16; Admin Dose 100 MG;  Start 2/26/19 at 11:00


Zolpidem Tartrate (Ambien) 5 mg QHS  PRN PO .INSOMNIA;  Start 2/26/19 at 11:00


Carvedilol (Coreg) 3.125 mg BID PO  Last administered on 3/5/19at 20:42; Admin 


Dose 3.125 MG;  Start 2/26/19 at 11:30


Diagnostic Test (Pha) (Accu-Chek) 1 ea 02 XX ;  Start 2/27/19 at 02:00


Miscellaneous Information 1 ea NOTE XX ;  Start 2/26/19 at 12:30


Glucose (Glutose) 15 gm Q15M  PRN PO DECREASED GLUCOSE;  Start 2/26/19 at 12:30


Glucose (Glutose) 22.5 gm Q15M  PRN PO DECREASED GLUCOSE;  Start 2/26/19 at 


12:30


Dextrose (D50w Syringe) 25 ml Q15M  PRN IV DECREASED GLUCOSE;  Start 2/26/19 at 


12:30


Dextrose (D50w Syringe) 50 ml Q15M  PRN IV DECREASED GLUCOSE;  Start 2/26/19 at 


12:30


Glucagon (Glucagen) 1 mg Q15M  PRN IM DECREASED GLUCOSE;  Start 2/26/19 at 12:30


Glucose (Glutose) 15 gm Q15M  PRN BUCCAL DECREASED GLUCOSE;  Start 2/26/19 at 


12:30


Polyethylene Glycol (Miralax) 17 gm BID PO  Last administered on 3/5/19at 20:39;


Admin Dose 17 GM;  Start 2/27/19 at 09:00


Clonidine (Catapres) 0.1 mg Q4H  PRN PO ELEVATED SYSTOLIC BP Last administered 


on 2/28/19at 20:40; Admin Dose 0.1 MG;  Start 2/28/19 at 15:30


Levalbuterol (Xopenex Neb) 1.25 mg PACU ORDER PRN HHN .WHEEZING;  Start 3/1/19 


at 12:30


Morphine Sulfate (morphine) 6 mg Q4H  PRN PO SEVERE PAIN LEVEL 7-10 Last 


administered on 3/6/19at 03:13; Admin Dose 6 MG;  Start 3/4/19 at 16:00


Insulin Aspart (Novolog Insulin Pen) (Adult SC Insulin - Mild Algorithm)... Q4 


SC  Last administered on 3/6/19at 05:01; Admin Dose 1 UNIT;  Start 3/4/19 at 


21:00


Morphine Sulfate (morphine) 2 mg Q2H  PRN IV PAIN LEVEL 6-10 Last administered 


on 3/6/19at 05:02; Admin Dose 2 MG;  Start 3/4/19 at 21:00


Acetaminophen/ Hydrocodone Bitart (Norco (5/325)) 1 tab Q6H  PRN PO PAIN LEVEL 


6-10;  Start 3/4/19 at 21:00


Acetaminophen/ Hydrocodone Bitart (Norco (10/325)) 1 tab Q6H  PRN PO PAIN LEVEL 


6-10 Last administered on 3/5/19at 08:37; Admin Dose 1 TAB;  Start 3/4/19 at 


21:00


Diphenhydramine HCl (Benadryl) 25 mg Q6H  PRN IV ITCHING;  Start 3/4/19 at 21:00


Ondansetron HCl (Zofran Inj) 4 mg Q6H  PRN IV NAUSEA AND/OR VOMITING Last 


administered on 3/4/19at 23:48; Admin Dose 4 MG;  Start 3/4/19 at 21:00


Famotidine (Pepcid Iv) 20 mg DAILY IV  Last administered on 3/5/19at 08:36; 


Admin Dose 20 MG;  Start 3/5/19 at 09:00


Potassium Chloride/Dextrose/ Sod Cl 1,000 ml @  125 mls/hr Q8H IV  Last 


administered on 3/6/19at 05:01; Admin Dose 125 MLS/HR;  Start 3/4/19 at 22:00


Enoxaparin Sodium (Lovenox) 40 mg DAILY@07 SC  Last administered on 3/6/19at 


06:09; Admin Dose 40 MG;  Start 3/6/19 at 07:00











MARGIE GODINEZ MD            Mar 6, 2019 08:38

## 2019-03-07 VITALS — SYSTOLIC BLOOD PRESSURE: 154 MMHG | RESPIRATION RATE: 18 BRPM | HEART RATE: 81 BPM | DIASTOLIC BLOOD PRESSURE: 68 MMHG

## 2019-03-07 VITALS — HEART RATE: 90 BPM | DIASTOLIC BLOOD PRESSURE: 65 MMHG | RESPIRATION RATE: 17 BRPM | SYSTOLIC BLOOD PRESSURE: 140 MMHG

## 2019-03-07 VITALS — SYSTOLIC BLOOD PRESSURE: 148 MMHG | RESPIRATION RATE: 17 BRPM | DIASTOLIC BLOOD PRESSURE: 76 MMHG | HEART RATE: 87 BPM

## 2019-03-07 LAB
ABNORMAL IP MESSAGE: 1
ADD MAN DIFF?: NO
ALANINE AMINOTRANSFERASE: 11 IU/L (ref 13–69)
ALBUMIN/GLOBULIN RATIO: 0.92
ALBUMIN: 2.4 G/DL (ref 3.3–4.9)
ALKALINE PHOSPHATASE: 61 IU/L (ref 42–121)
ANION GAP: 4 (ref 5–13)
ASPARTATE AMINO TRANSFERASE: 17 IU/L (ref 15–46)
BASOPHIL #: 0 10^3/UL (ref 0–0.1)
BASOPHILS %: 0.4 % (ref 0–2)
BILIRUBIN,DIRECT: 0 MG/DL (ref 0–0.2)
BILIRUBIN,TOTAL: 0.2 MG/DL (ref 0.2–1.3)
BLOOD UREA NITROGEN: 4 MG/DL (ref 7–20)
CALCIUM: 6.9 MG/DL (ref 8.4–10.2)
CARBON DIOXIDE: 29 MMOL/L (ref 21–31)
CHLORIDE: 107 MMOL/L (ref 97–110)
CREATININE: 0.79 MG/DL (ref 0.44–1)
EOSINOPHILS #: 0.3 10^3/UL (ref 0–0.5)
EOSINOPHILS %: 2.3 % (ref 0–7)
GLOBULIN: 2.6 G/DL (ref 1.3–3.2)
GLUCOSE: 115 MG/DL (ref 70–220)
HEMATOCRIT: 28.6 % (ref 37–47)
HEMOGLOBIN: 8.9 G/DL (ref 12–16)
IMMATURE GRANS #M: 0.04 10^3/UL (ref 0–0.03)
IMMATURE GRANS % (M): 0.4 % (ref 0–0.43)
INR: 1.29
LYMPHOCYTES #: 0.8 10^3/UL (ref 0.8–2.9)
LYMPHOCYTES %: 6.9 % (ref 15–51)
MAGNESIUM: 1.3 MG/DL (ref 1.7–2.5)
MEAN CORPUSCULAR HEMOGLOBIN: 25.1 PG (ref 29–33)
MEAN CORPUSCULAR HGB CONC: 31.1 G/DL (ref 32–37)
MEAN CORPUSCULAR VOLUME: 80.8 FL (ref 82–101)
MEAN PLATELET VOLUME: 12.6 FL (ref 7.4–10.4)
MONOCYTE #: 0.9 10^3/UL (ref 0.3–0.9)
MONOCYTES %: 7.7 % (ref 0–11)
NEUTROPHIL #: 9.1 10^3/UL (ref 1.6–7.5)
NEUTROPHILS %: 82.3 % (ref 39–77)
NUCLEATED RED BLOOD CELLS #: 0 10^3/UL (ref 0–0)
NUCLEATED RED BLOOD CELLS%: 0 /100WBC (ref 0–0)
PARTIAL THROMBOPLASTIN TIME: 34.3 SEC (ref 23–35)
PLATELET COUNT: 85 10^3/UL (ref 140–415)
POSITIVE DIFF: (no result)
POTASSIUM: 3.1 MMOL/L (ref 3.5–5.1)
PROTIME: 16.2 SEC (ref 11.9–14.9)
PT RATIO: 1.3
RED BLOOD COUNT: 3.54 10^6/UL (ref 4.2–5.4)
RED CELL DISTRIBUTION WIDTH: 14.8 % (ref 11.5–14.5)
SODIUM: 140 MMOL/L (ref 135–144)
TOTAL PROTEIN: 5 G/DL (ref 6.1–8.1)
WHITE BLOOD COUNT: 11.1 10^3/UL (ref 4.8–10.8)

## 2019-03-07 RX ADMIN — INSULIN ASPART 1 UNIT: 100 INJECTION, SOLUTION INTRAVENOUS; SUBCUTANEOUS at 21:00

## 2019-03-07 RX ADMIN — MORPHINE SULFATE 1 MG: 2 INJECTION, SOLUTION INTRAMUSCULAR; INTRAVENOUS at 12:40

## 2019-03-07 RX ADMIN — MORPHINE SULFATE 1 MG: 2 INJECTION, SOLUTION INTRAMUSCULAR; INTRAVENOUS at 15:40

## 2019-03-07 RX ADMIN — DOCUSATE SODIUM 1 MG: 100 CAPSULE, LIQUID FILLED ORAL at 12:44

## 2019-03-07 RX ADMIN — POLYETHYLENE GLYCOL 3350 SCH GM: 17 POWDER, FOR SOLUTION ORAL at 21:09

## 2019-03-07 RX ADMIN — HYDROCODONE BITARTRATE AND ACETAMINOPHEN 1 TAB: 10; 325 TABLET ORAL at 17:22

## 2019-03-07 RX ADMIN — MORPHINE SULFATE PRN MG: 2 INJECTION, SOLUTION INTRAMUSCULAR; INTRAVENOUS at 00:14

## 2019-03-07 RX ADMIN — MORPHINE SULFATE PRN MG: 2 INJECTION, SOLUTION INTRAMUSCULAR; INTRAVENOUS at 15:40

## 2019-03-07 RX ADMIN — ACETAMINOPHEN 1 MG: 325 TABLET, FILM COATED ORAL at 00:23

## 2019-03-07 RX ADMIN — FAMOTIDINE 1 MG: 20 TABLET ORAL at 21:09

## 2019-03-07 RX ADMIN — POTASSIUM CHLORIDE SCH MEQ: 20 TABLET, EXTENDED RELEASE ORAL at 09:18

## 2019-03-07 RX ADMIN — DOCUSATE SODIUM PRN MG: 100 CAPSULE, LIQUID FILLED ORAL at 12:44

## 2019-03-07 RX ADMIN — ASCORBIC ACID, VITAMIN A PALMITATE, CHOLECALCIFEROL, THIAMINE HYDROCHLORIDE, RIBOFLAVIN-5 PHOSPHATE SODIUM, PYRIDOXINE HYDROCHLORIDE, NIACINAMIDE, DEXPANTHENOL, ALPHA-TOCOPHEROL ACETATE, VITAMIN K1, FOLIC ACID, BIOTIN, CYANOCOBALAMIN SCH MLS/HR: 200; 3300; 200; 6; 3.6; 6; 40; 15; 10; 150; 600; 60; 5 INJECTION, SOLUTION INTRAVENOUS at 18:47

## 2019-03-07 RX ADMIN — MORPHINE SULFATE PRN MG: 2 INJECTION, SOLUTION INTRAMUSCULAR; INTRAVENOUS at 23:26

## 2019-03-07 RX ADMIN — ASCORBIC ACID, VITAMIN A PALMITATE, CHOLECALCIFEROL, THIAMINE HYDROCHLORIDE, RIBOFLAVIN-5 PHOSPHATE SODIUM, PYRIDOXINE HYDROCHLORIDE, NIACINAMIDE, DEXPANTHENOL, ALPHA-TOCOPHEROL ACETATE, VITAMIN K1, FOLIC ACID, BIOTIN, CYANOCOBALAMIN 1 MLS/HR: 200; 3300; 200; 6; 3.6; 6; 40; 15; 10; 150; 600; 60; 5 INJECTION, SOLUTION INTRAVENOUS at 05:50

## 2019-03-07 RX ADMIN — ONDANSETRON HYDROCHLORIDE 1 MG: 2 INJECTION, SOLUTION INTRAMUSCULAR; INTRAVENOUS at 12:51

## 2019-03-07 RX ADMIN — MORPHINE SULFATE PRN MG: 2 INJECTION, SOLUTION INTRAMUSCULAR; INTRAVENOUS at 12:40

## 2019-03-07 RX ADMIN — ENOXAPARIN SODIUM 1 MG: 100 INJECTION SUBCUTANEOUS at 06:07

## 2019-03-07 RX ADMIN — INSULIN ASPART 1 UNIT: 100 INJECTION, SOLUTION INTRAVENOUS; SUBCUTANEOUS at 12:10

## 2019-03-07 RX ADMIN — MORPHINE SULFATE 1 MG: 2 INJECTION, SOLUTION INTRAMUSCULAR; INTRAVENOUS at 23:26

## 2019-03-07 RX ADMIN — POTASSIUM CHLORIDE 1 MEQ: 1500 TABLET, EXTENDED RELEASE ORAL at 09:18

## 2019-03-07 RX ADMIN — MORPHINE SULFATE 1 MG: 2 INJECTION, SOLUTION INTRAMUSCULAR; INTRAVENOUS at 09:17

## 2019-03-07 RX ADMIN — POTASSIUM CHLORIDE 1 MEQ: 1500 TABLET, EXTENDED RELEASE ORAL at 21:09

## 2019-03-07 RX ADMIN — FAMOTIDINE SCH MG: 20 TABLET ORAL at 21:09

## 2019-03-07 RX ADMIN — MAGNESIUM SULFATE HEPTAHYDRATE 1 MLS/HR: 500 INJECTION, SOLUTION INTRAMUSCULAR; INTRAVENOUS at 13:17

## 2019-03-07 RX ADMIN — MORPHINE SULFATE PRN MG: 2 INJECTION, SOLUTION INTRAMUSCULAR; INTRAVENOUS at 09:17

## 2019-03-07 RX ADMIN — POLYETHYLENE GLYCOL 3350 SCH GM: 17 POWDER, FOR SOLUTION ORAL at 08:38

## 2019-03-07 RX ADMIN — INSULIN ASPART 1 UNIT: 100 INJECTION, SOLUTION INTRAVENOUS; SUBCUTANEOUS at 05:00

## 2019-03-07 RX ADMIN — POTASSIUM CHLORIDE SCH MEQ: 20 TABLET, EXTENDED RELEASE ORAL at 21:09

## 2019-03-07 RX ADMIN — ASCORBIC ACID, VITAMIN A PALMITATE, CHOLECALCIFEROL, THIAMINE HYDROCHLORIDE, RIBOFLAVIN-5 PHOSPHATE SODIUM, PYRIDOXINE HYDROCHLORIDE, NIACINAMIDE, DEXPANTHENOL, ALPHA-TOCOPHEROL ACETATE, VITAMIN K1, FOLIC ACID, BIOTIN, CYANOCOBALAMIN SCH MLS/HR: 200; 3300; 200; 6; 3.6; 6; 40; 15; 10; 150; 600; 60; 5 INJECTION, SOLUTION INTRAVENOUS at 05:50

## 2019-03-07 RX ADMIN — MORPHINE SULFATE 1 MG: 2 INJECTION, SOLUTION INTRAMUSCULAR; INTRAVENOUS at 00:14

## 2019-03-07 RX ADMIN — FAMOTIDINE SCH MG: 20 TABLET ORAL at 08:38

## 2019-03-07 RX ADMIN — ENOXAPARIN SODIUM SCH MG: 100 INJECTION SUBCUTANEOUS at 06:07

## 2019-03-07 RX ADMIN — POLYETHYLENE GLYCOL 3350 1 GM: 17 POWDER, FOR SOLUTION ORAL at 08:38

## 2019-03-07 RX ADMIN — INSULIN ASPART 1 UNIT: 100 INJECTION, SOLUTION INTRAVENOUS; SUBCUTANEOUS at 08:38

## 2019-03-07 RX ADMIN — POLYETHYLENE GLYCOL 3350 1 GM: 17 POWDER, FOR SOLUTION ORAL at 21:09

## 2019-03-07 RX ADMIN — INSULIN ASPART 1 UNIT: 100 INJECTION, SOLUTION INTRAVENOUS; SUBCUTANEOUS at 00:39

## 2019-03-07 RX ADMIN — INSULIN ASPART 1 UNIT: 100 INJECTION, SOLUTION INTRAVENOUS; SUBCUTANEOUS at 17:23

## 2019-03-07 RX ADMIN — FAMOTIDINE 1 MG: 20 TABLET ORAL at 08:38

## 2019-03-07 RX ADMIN — ASCORBIC ACID, VITAMIN A PALMITATE, CHOLECALCIFEROL, THIAMINE HYDROCHLORIDE, RIBOFLAVIN-5 PHOSPHATE SODIUM, PYRIDOXINE HYDROCHLORIDE, NIACINAMIDE, DEXPANTHENOL, ALPHA-TOCOPHEROL ACETATE, VITAMIN K1, FOLIC ACID, BIOTIN, CYANOCOBALAMIN 1 MLS/HR: 200; 3300; 200; 6; 3.6; 6; 40; 15; 10; 150; 600; 60; 5 INJECTION, SOLUTION INTRAVENOUS at 18:47

## 2019-03-07 RX ADMIN — DEXAMETHASONE SODIUM PHOSPHATE PRN MG: 10 INJECTION, SOLUTION INTRAMUSCULAR; INTRAVENOUS at 12:51

## 2019-03-07 NOTE — CONS
Assessment/Plan


Assessment/Plan


Assessment/Plan (Daily)


1. Acute kidney injury due to possible prerenal azotemia + possibely obstruction


from mass


2.  H/o Uterine CA s/p hysterectomy, now concerned about recurrence of tumour 


3. pelvis mass, pssible recurrence 


4. Bilateral Hydronephrosis Cystoscopy and insertion of bilateral ureteral JJ 


stents on 3/1/19 by Dr. Han  


5. H/O HTN


6. H/o DM II





Plan:


Cystoscopy and insertion of bilateral ureteral JJ stents on 3/1/19 by Dr. Han, BUN/Cr improved to 12/0.98 - Other electrolytes stable 


s/p GYN oncology consult -s/p Exp Lap, Resection of pelvic tumour and Small 


bowel on 3/4/18- doing well


BUn/Cr improved to normal, K replacement has been ordered for today 


IVF NS with KCl 20mEQ at 125cc/hr


magnesium suflate 6 gram IV x1  ordered for today 


will follow up





Consultation Date/Type/Reason


Admit Date/Time


Feb 26, 2019 at 10:49


Initial Consult Date


2/26/19


Type of Consult


NEPHROLOGY


Requesting Provider:  JOY RAMIREZ MD


Date/Time of Note


DATE: 3/7/19 


TIME: 11:49





Exam/Review of Systems


Exam


Vitals





Vital Signs


  Date      Temp  Pulse  Resp  B/P (MAP)   Pulse Ox  O2          O2 Flow    FiO2


Time                                                 Delivery    Rate


    3/7/19                                           Nasal             2.0


     08:00                                           Cannula


    3/7/19  98.9     81    18      154/68        98


     07:50                           (96)








Intake and Output





3/6/19


3/6/19


3/7/19





1515:00


23:00


07:00





IntakeIntake Total


1000 ml


870 ml


1020.8 ml





OutputOutput Total


630 ml





BalanceBalance


1000 ml


240 ml


1020.8 ml














Results


Result Diagram:  


3/7/19 0605                                                                     


          3/7/19 0605





Results 24hrs





Laboratory Tests


Test
                     3/6/19
13:05  3/6/19
17:10  3/6/19
20:39  3/7/19
00:26


Bedside Glucose                  136           138           123           125


Test
                     3/7/19
05:52  3/7/19
06:05  3/7/19
08:37  



Bedside Glucose                  119                         130


White Blood Count                           11.1  #H


Red Blood Count                             3.54  #L


Hemoglobin                                   8.9  #L


Hematocrit                                  28.6  #L


Mean Corpuscular Volume                      80.8  L


Mean Corpuscular                             25.1  L


Hemoglobin


Mean Corpuscular          
                 31.1  L
  
             



Hemoglobin
Concent


Red Cell Distribution                        14.8  H


Width


Platelet Count                                 85  L


Mean Platelet Volume                         12.6  H


Immature Granulocytes %                      0.400


Neutrophils %                                82.3  H


Lymphocytes %                                 6.9  L


Monocytes %                                    7.7


Eosinophils %                                  2.3


Basophils %                                    0.4


Nucleated Red Blood                            0.0


Cells %


Immature Granulocytes #                     0.040  H


Neutrophils #                                 9.1  H


Lymphocytes #                                  0.8


Monocytes #                                    0.9


Eosinophils #                                  0.3


Basophils #                                    0.0


Nucleated Red Blood                            0.0


Cells #


Prothrombin Time                             16.2  H


Prothrombin Time Ratio                         1.3


[employer]R [employer]         
                  1.29  
  
             



Normalized
Ratio


Activated                 
                  34.3  
  
             



Partial
Thromboplast


Time


Sodium Level                                   140


Potassium Level                               3.1  L


Chloride Level                                 107


Carbon Dioxide Level                            29


Anion Gap                                       4  L


Blood Urea Nitrogen                             4  L


Creatinine                                    0.79


Est Glomerular Filtrat    
             > 60  
       
             



Rate
mL/min


Glucose Level                                  115


Calcium Level                                 6.9  L


Magnesium Level                               1.3  L


Total Bilirubin                                0.2


Direct Bilirubin                              0.00


Indirect Bilirubin                             0.2


Aspartate Amino           
                    17  
  
             



Transf
(AST/SGOT)


Alanine                   
                   11  L
  
             



Aminotransferase
(ALT/SG


PT)


Alkaline Phosphatase                            61


Total Protein                                 5.0  L


Albumin                                       2.4  L


Globulin                                      2.60


Albumin/Globulin Ratio                        0.92








Medications


Medication





Current Medications


IV Flush (NS 3 ml) 3 ml PER PROTOCOL IV ;  Start 2/26/19 at 11:00


Acetaminophen (Tylenol Tab) 650 mg Q6H  PRN PO .PAIN 1-3 OR TEMP Last 


administered on 3/7/19at 00:23; Admin Dose 650 MG;  Start 2/26/19 at 11:00


Docusate Sodium (Colace) 100 mg Q12H  PRN PO .CONSTIPATION Last administered on 


2/27/19at 18:16; Admin Dose 100 MG;  Start 2/26/19 at 11:00


Zolpidem Tartrate (Ambien) 5 mg QHS  PRN PO .INSOMNIA;  Start 2/26/19 at 11:00


Carvedilol (Coreg) 3.125 mg BID PO  Last administered on 3/7/19at 08:38; Admin 


Dose 3.125 MG;  Start 2/26/19 at 11:30


Diagnostic Test (Pha) (Accu-Chek) 1 ea 02 XX ;  Start 2/27/19 at 02:00


Miscellaneous Information 1 ea NOTE XX ;  Start 2/26/19 at 12:30


Glucose (Glutose) 15 gm Q15M  PRN PO DECREASED GLUCOSE;  Start 2/26/19 at 12:30


Glucose (Glutose) 22.5 gm Q15M  PRN PO DECREASED GLUCOSE;  Start 2/26/19 at 


12:30


Dextrose (D50w Syringe) 25 ml Q15M  PRN IV DECREASED GLUCOSE;  Start 2/26/19 at 


12:30


Dextrose (D50w Syringe) 50 ml Q15M  PRN IV DECREASED GLUCOSE;  Start 2/26/19 at 


12:30


Glucagon (Glucagen) 1 mg Q15M  PRN IM DECREASED GLUCOSE;  Start 2/26/19 at 12:30


Glucose (Glutose) 15 gm Q15M  PRN BUCCAL DECREASED GLUCOSE;  Start 2/26/19 at 


12:30


Polyethylene Glycol (Miralax) 17 gm BID PO  Last administered on 3/7/19at 08:38;


Admin Dose 17 GM;  Start 2/27/19 at 09:00


Clonidine (Catapres) 0.1 mg Q4H  PRN PO ELEVATED SYSTOLIC BP Last administered 


on 2/28/19at 20:40; Admin Dose 0.1 MG;  Start 2/28/19 at 15:30


Morphine Sulfate (morphine) 6 mg Q4H  PRN PO SEVERE PAIN LEVEL 7-10 Last 


administered on 3/6/19at 16:29; Admin Dose 6 MG;  Start 3/4/19 at 16:00


Morphine Sulfate (morphine) 2 mg Q2H  PRN IV PAIN LEVEL 6-10 Last administered 


on 3/7/19at 09:17; Admin Dose 2 MG;  Start 3/4/19 at 21:00


Acetaminophen/ Hydrocodone Bitart (Norco (5/325)) 1 tab Q6H  PRN PO PAIN LEVEL 


6-10;  Start 3/4/19 at 21:00


Acetaminophen/ Hydrocodone Bitart (Norco (10/325)) 1 tab Q6H  PRN PO PAIN LEVEL 


6-10 Last administered on 3/5/19at 08:37; Admin Dose 1 TAB;  Start 3/4/19 at 


21:00


Diphenhydramine HCl (Benadryl) 25 mg Q6H  PRN IV ITCHING;  Start 3/4/19 at 21:00


Ondansetron HCl (Zofran Inj) 4 mg Q6H  PRN IV NAUSEA AND/OR VOMITING Last 


administered on 3/4/19at 23:48; Admin Dose 4 MG;  Start 3/4/19 at 21:00


Potassium Chloride/Dextrose/ Sod Cl 1,000 ml @  125 mls/hr Q8H IV  Last 


administered on 3/7/19at 05:50; Admin Dose 125 MLS/HR;  Start 3/4/19 at 22:00


Enoxaparin Sodium (Lovenox) 40 mg DAILY@07 SC  Last administered on 3/7/19at 0


6:07; Admin Dose 40 MG;  Start 3/6/19 at 07:00


Famotidine (Pepcid) 20 mg Q12 PO  Last administered on 3/7/19at 08:38; Admin 


Dose 20 MG;  Start 3/6/19 at 21:00


Potassium Chloride (Klor-Con 20) 40 meq BID PO  Last administered on 3/7/19at 


09:18; Admin Dose 40 MEQ;  Start 3/7/19 at 09:00;  Stop 3/7/19 at 21:01


Insulin Aspart (Novolog Insulin Pen) (Adult SC Insulin - Mild Algorithm)... AC 


MEALS AND  BEDTIME SC ;  Start 3/7/19 at 12:30











MARGIE GODINEZ MD            Mar 7, 2019 11:49

## 2019-03-07 NOTE — PN
Date/Time of Note


Date/Time of Note


DATE: 3/7/19 


TIME: 09:18





Subjective


Slept well.  Complains of abdominal pain.  Burping a lot.  No flatus





Objective


Vitals





Vital Signs


  Date      Temp  Pulse  Resp  B/P (MAP)   Pulse Ox  O2          O2 Flow    FiO2


Time                                                 Delivery    Rate


    3/7/19  98.9     81    18      154/68        98  Nasal


     07:50                           (96)            Cannula


    3/5/19                                                             3.0


     20:12








Intake and Output





3/6/19


3/6/19


3/7/19





1515:00


23:00


07:00





IntakeIntake Total


1000 ml


870 ml


1020.8 ml





OutputOutput Total


630 ml





BalanceBalance


1000 ml


240 ml


1020.8 ml











Lungs clear to auscultation bilaterally


Cardiac regular rate and rhythm no murmurs or gallops


Abdomen soft, diffusely tender.  Normoactive bowel sounds.  Clean dressing


Carbajal catheter with cranberry colored urine


Extremities no clubbing cyanosis or edema


Neurological nonfocal





Results


Result Diagram:  


3/7/19 0605                                                                     


          3/7/19 0605








Medications


Medications





Current Medications


IV Flush (NS 3 ml) 3 ml PER PROTOCOL IV ;  Start 2/26/19 at 11:00


Acetaminophen (Tylenol Tab) 650 mg Q6H  PRN PO .PAIN 1-3 OR TEMP Last 


administered on 3/7/19at 00:23; Admin Dose 650 MG;  Start 2/26/19 at 11:00


Docusate Sodium (Colace) 100 mg Q12H  PRN PO .CONSTIPATION Last administered on 


2/27/19at 18:16; Admin Dose 100 MG;  Start 2/26/19 at 11:00


Zolpidem Tartrate (Ambien) 5 mg QHS  PRN PO .INSOMNIA;  Start 2/26/19 at 11:00


Carvedilol (Coreg) 3.125 mg BID PO  Last administered on 3/7/19at 08:38; Admin 


Dose 3.125 MG;  Start 2/26/19 at 11:30


Diagnostic Test (Pha) (Accu-Chek) 1 ea 02 XX ;  Start 2/27/19 at 02:00


Miscellaneous Information 1 ea NOTE XX ;  Start 2/26/19 at 12:30


Glucose (Glutose) 15 gm Q15M  PRN PO DECREASED GLUCOSE;  Start 2/26/19 at 12:30


Glucose (Glutose) 22.5 gm Q15M  PRN PO DECREASED GLUCOSE;  Start 2/26/19 at 


12:30


Dextrose (D50w Syringe) 25 ml Q15M  PRN IV DECREASED GLUCOSE;  Start 2/26/19 at 


12:30


Dextrose (D50w Syringe) 50 ml Q15M  PRN IV DECREASED GLUCOSE;  Start 2/26/19 at 


12:30


Glucagon (Glucagen) 1 mg Q15M  PRN IM DECREASED GLUCOSE;  Start 2/26/19 at 12:30


Glucose (Glutose) 15 gm Q15M  PRN BUCCAL DECREASED GLUCOSE;  Start 2/26/19 at 


12:30


Polyethylene Glycol (Miralax) 17 gm BID PO  Last administered on 3/7/19at 08:38;


Admin Dose 17 GM;  Start 2/27/19 at 09:00


Clonidine (Catapres) 0.1 mg Q4H  PRN PO ELEVATED SYSTOLIC BP Last administered 


on 2/28/19at 20:40; Admin Dose 0.1 MG;  Start 2/28/19 at 15:30


Morphine Sulfate (morphine) 6 mg Q4H  PRN PO SEVERE PAIN LEVEL 7-10 Last 


administered on 3/6/19at 16:29; Admin Dose 6 MG;  Start 3/4/19 at 16:00


Insulin Aspart (Novolog Insulin Pen) (Adult SC Insulin - Mild Algorithm)... Q4 


SC  Last administered on 3/6/19at 05:01; Admin Dose 1 UNIT;  Start 3/4/19 at 


21:00


Morphine Sulfate (morphine) 2 mg Q2H  PRN IV PAIN LEVEL 6-10 Last administered 


on 3/7/19at 00:14; Admin Dose 2 MG;  Start 3/4/19 at 21:00


Acetaminophen/ Hydrocodone Bitart (Norco (5/325)) 1 tab Q6H  PRN PO PAIN LEVEL 


6-10;  Start 3/4/19 at 21:00


Acetaminophen/ Hydrocodone Bitart (Norco (10/325)) 1 tab Q6H  PRN PO PAIN LEVEL 


6-10 Last administered on 3/5/19at 08:37; Admin Dose 1 TAB;  Start 3/4/19 at 


21:00


Diphenhydramine HCl (Benadryl) 25 mg Q6H  PRN IV ITCHING;  Start 3/4/19 at 21:00


Ondansetron HCl (Zofran Inj) 4 mg Q6H  PRN IV NAUSEA AND/OR VOMITING Last 


administered on 3/4/19at 23:48; Admin Dose 4 MG;  Start 3/4/19 at 21:00


Potassium Chloride/Dextrose/ Sod Cl 1,000 ml @  125 mls/hr Q8H IV  Last 


administered on 3/7/19at 05:50; Admin Dose 125 MLS/HR;  Start 3/4/19 at 22:00


Enoxaparin Sodium (Lovenox) 40 mg DAILY@07 SC  Last administered on 3/7/19at 


06:07; Admin Dose 40 MG;  Start 3/6/19 at 07:00


Famotidine (Pepcid) 20 mg Q12 PO  Last administered on 3/7/19at 08:38; Admin 


Dose 20 MG;  Start 3/6/19 at 21:00


Magnesium Sulfate 6 gm/Dextrose 112 ml @  100 mls/hr ONCE  ONCE IVPB ;  Start 


3/7/19 at 10:00;  Stop 3/7/19 at 11:07


Potassium Chloride (Klor-Con 20) 40 meq BID PO ;  Start 3/7/19 at 09:00;  Stop 


3/7/19 at 21:01





VTE Prophylaxis


Risk score (from Nsg)>0 risk:  3


SCD applied (from Nsg):  Yes





Lines/Catheters


IV Catheter Type:  Saline Lock


Carbajal in Place:  Yes


Cont'd carbajal catheter reason:  urinary retention





Assessment/Plan


Assessment/Plan


58-year-old female with recurrent ovarian cancer


Postop day #3 pelvic mass resection, enterolysis, and partial small bowel 


resection


Status post bilateral ureteral stent placement


Hypomagnesemia


Hypokalemia





Continue postop care


Ambulate with PT


Magnesium and potassium supplementation


Plan of care was discussed with the patient and her daughter at the bedside











JOY RAMIREZ MD                   Mar 7, 2019 09:20

## 2019-03-08 VITALS — SYSTOLIC BLOOD PRESSURE: 162 MMHG | HEART RATE: 78 BPM | DIASTOLIC BLOOD PRESSURE: 75 MMHG | RESPIRATION RATE: 17 BRPM

## 2019-03-08 VITALS — DIASTOLIC BLOOD PRESSURE: 67 MMHG | SYSTOLIC BLOOD PRESSURE: 140 MMHG | HEART RATE: 82 BPM | RESPIRATION RATE: 19 BRPM

## 2019-03-08 VITALS — RESPIRATION RATE: 16 BRPM | HEART RATE: 77 BPM | SYSTOLIC BLOOD PRESSURE: 118 MMHG | DIASTOLIC BLOOD PRESSURE: 61 MMHG

## 2019-03-08 VITALS — DIASTOLIC BLOOD PRESSURE: 79 MMHG | SYSTOLIC BLOOD PRESSURE: 158 MMHG | RESPIRATION RATE: 17 BRPM | HEART RATE: 83 BPM

## 2019-03-08 LAB
ANION GAP: 6 (ref 5–13)
BLOOD UREA NITROGEN: 5 MG/DL (ref 7–20)
CALCIUM: 6.9 MG/DL (ref 8.4–10.2)
CARBON DIOXIDE: 29 MMOL/L (ref 21–31)
CHLORIDE: 109 MMOL/L (ref 97–110)
CREATININE: 0.74 MG/DL (ref 0.44–1)
GLUCOSE: 139 MG/DL (ref 70–220)
MAGNESIUM: 2.3 MG/DL (ref 1.7–2.5)
POTASSIUM: 3.9 MMOL/L (ref 3.5–5.1)
SODIUM: 144 MMOL/L (ref 135–144)

## 2019-03-08 RX ADMIN — INSULIN ASPART 1 UNIT: 100 INJECTION, SOLUTION INTRAVENOUS; SUBCUTANEOUS at 12:35

## 2019-03-08 RX ADMIN — MORPHINE SULFATE 1 MG: 2 INJECTION, SOLUTION INTRAMUSCULAR; INTRAVENOUS at 23:04

## 2019-03-08 RX ADMIN — MORPHINE SULFATE PRN MG: 2 INJECTION, SOLUTION INTRAMUSCULAR; INTRAVENOUS at 03:52

## 2019-03-08 RX ADMIN — FAMOTIDINE 1 MG: 20 TABLET ORAL at 20:48

## 2019-03-08 RX ADMIN — INSULIN ASPART 1 UNIT: 100 INJECTION, SOLUTION INTRAVENOUS; SUBCUTANEOUS at 08:09

## 2019-03-08 RX ADMIN — FAMOTIDINE 1 MG: 20 TABLET ORAL at 08:04

## 2019-03-08 RX ADMIN — MORPHINE SULFATE 1 MG: 2 INJECTION, SOLUTION INTRAMUSCULAR; INTRAVENOUS at 20:52

## 2019-03-08 RX ADMIN — MORPHINE SULFATE PRN MG: 2 INJECTION, SOLUTION INTRAMUSCULAR; INTRAVENOUS at 20:52

## 2019-03-08 RX ADMIN — ASCORBIC ACID, VITAMIN A PALMITATE, CHOLECALCIFEROL, THIAMINE HYDROCHLORIDE, RIBOFLAVIN-5 PHOSPHATE SODIUM, PYRIDOXINE HYDROCHLORIDE, NIACINAMIDE, DEXPANTHENOL, ALPHA-TOCOPHEROL ACETATE, VITAMIN K1, FOLIC ACID, BIOTIN, CYANOCOBALAMIN 1 MLS/HR: 200; 3300; 200; 6; 3.6; 6; 40; 15; 10; 150; 600; 60; 5 INJECTION, SOLUTION INTRAVENOUS at 17:21

## 2019-03-08 RX ADMIN — INSULIN ASPART 1 UNIT: 100 INJECTION, SOLUTION INTRAVENOUS; SUBCUTANEOUS at 17:25

## 2019-03-08 RX ADMIN — ASCORBIC ACID, VITAMIN A PALMITATE, CHOLECALCIFEROL, THIAMINE HYDROCHLORIDE, RIBOFLAVIN-5 PHOSPHATE SODIUM, PYRIDOXINE HYDROCHLORIDE, NIACINAMIDE, DEXPANTHENOL, ALPHA-TOCOPHEROL ACETATE, VITAMIN K1, FOLIC ACID, BIOTIN, CYANOCOBALAMIN SCH MLS/HR: 200; 3300; 200; 6; 3.6; 6; 40; 15; 10; 150; 600; 60; 5 INJECTION, SOLUTION INTRAVENOUS at 13:00

## 2019-03-08 RX ADMIN — FAMOTIDINE SCH MG: 20 TABLET ORAL at 08:04

## 2019-03-08 RX ADMIN — POLYETHYLENE GLYCOL 3350 1 GM: 17 POWDER, FOR SOLUTION ORAL at 09:34

## 2019-03-08 RX ADMIN — POLYETHYLENE GLYCOL 3350 1 GM: 17 POWDER, FOR SOLUTION ORAL at 20:48

## 2019-03-08 RX ADMIN — ASCORBIC ACID, VITAMIN A PALMITATE, CHOLECALCIFEROL, THIAMINE HYDROCHLORIDE, RIBOFLAVIN-5 PHOSPHATE SODIUM, PYRIDOXINE HYDROCHLORIDE, NIACINAMIDE, DEXPANTHENOL, ALPHA-TOCOPHEROL ACETATE, VITAMIN K1, FOLIC ACID, BIOTIN, CYANOCOBALAMIN 1 MLS/HR: 200; 3300; 200; 6; 3.6; 6; 40; 15; 10; 150; 600; 60; 5 INJECTION, SOLUTION INTRAVENOUS at 10:44

## 2019-03-08 RX ADMIN — ASCORBIC ACID, VITAMIN A PALMITATE, CHOLECALCIFEROL, THIAMINE HYDROCHLORIDE, RIBOFLAVIN-5 PHOSPHATE SODIUM, PYRIDOXINE HYDROCHLORIDE, NIACINAMIDE, DEXPANTHENOL, ALPHA-TOCOPHEROL ACETATE, VITAMIN K1, FOLIC ACID, BIOTIN, CYANOCOBALAMIN SCH MLS/HR: 200; 3300; 200; 6; 3.6; 6; 40; 15; 10; 150; 600; 60; 5 INJECTION, SOLUTION INTRAVENOUS at 10:44

## 2019-03-08 RX ADMIN — MORPHINE SULFATE PRN MG: 2 INJECTION, SOLUTION INTRAMUSCULAR; INTRAVENOUS at 23:04

## 2019-03-08 RX ADMIN — MORPHINE SULFATE PRN MG: 2 INJECTION, SOLUTION INTRAMUSCULAR; INTRAVENOUS at 06:17

## 2019-03-08 RX ADMIN — ASCORBIC ACID, VITAMIN A PALMITATE, CHOLECALCIFEROL, THIAMINE HYDROCHLORIDE, RIBOFLAVIN-5 PHOSPHATE SODIUM, PYRIDOXINE HYDROCHLORIDE, NIACINAMIDE, DEXPANTHENOL, ALPHA-TOCOPHEROL ACETATE, VITAMIN K1, FOLIC ACID, BIOTIN, CYANOCOBALAMIN SCH MLS/HR: 200; 3300; 200; 6; 3.6; 6; 40; 15; 10; 150; 600; 60; 5 INJECTION, SOLUTION INTRAVENOUS at 03:52

## 2019-03-08 RX ADMIN — ASCORBIC ACID, VITAMIN A PALMITATE, CHOLECALCIFEROL, THIAMINE HYDROCHLORIDE, RIBOFLAVIN-5 PHOSPHATE SODIUM, PYRIDOXINE HYDROCHLORIDE, NIACINAMIDE, DEXPANTHENOL, ALPHA-TOCOPHEROL ACETATE, VITAMIN K1, FOLIC ACID, BIOTIN, CYANOCOBALAMIN 1 MLS/HR: 200; 3300; 200; 6; 3.6; 6; 40; 15; 10; 150; 600; 60; 5 INJECTION, SOLUTION INTRAVENOUS at 13:00

## 2019-03-08 RX ADMIN — ASCORBIC ACID, VITAMIN A PALMITATE, CHOLECALCIFEROL, THIAMINE HYDROCHLORIDE, RIBOFLAVIN-5 PHOSPHATE SODIUM, PYRIDOXINE HYDROCHLORIDE, NIACINAMIDE, DEXPANTHENOL, ALPHA-TOCOPHEROL ACETATE, VITAMIN K1, FOLIC ACID, BIOTIN, CYANOCOBALAMIN SCH MLS/HR: 200; 3300; 200; 6; 3.6; 6; 40; 15; 10; 150; 600; 60; 5 INJECTION, SOLUTION INTRAVENOUS at 17:21

## 2019-03-08 RX ADMIN — POLYETHYLENE GLYCOL 3350 SCH GM: 17 POWDER, FOR SOLUTION ORAL at 09:34

## 2019-03-08 RX ADMIN — POLYETHYLENE GLYCOL 3350 SCH GM: 17 POWDER, FOR SOLUTION ORAL at 20:48

## 2019-03-08 RX ADMIN — POTASSIUM CHLORIDE 1 MLS/HR: 200 INJECTION, SOLUTION INTRAVENOUS at 09:53

## 2019-03-08 RX ADMIN — MORPHINE SULFATE 1 MG: 2 INJECTION, SOLUTION INTRAMUSCULAR; INTRAVENOUS at 03:52

## 2019-03-08 RX ADMIN — ASCORBIC ACID, VITAMIN A PALMITATE, CHOLECALCIFEROL, THIAMINE HYDROCHLORIDE, RIBOFLAVIN-5 PHOSPHATE SODIUM, PYRIDOXINE HYDROCHLORIDE, NIACINAMIDE, DEXPANTHENOL, ALPHA-TOCOPHEROL ACETATE, VITAMIN K1, FOLIC ACID, BIOTIN, CYANOCOBALAMIN 1 MLS/HR: 200; 3300; 200; 6; 3.6; 6; 40; 15; 10; 150; 600; 60; 5 INJECTION, SOLUTION INTRAVENOUS at 03:52

## 2019-03-08 RX ADMIN — MORPHINE SULFATE 1 MG: 2 INJECTION, SOLUTION INTRAMUSCULAR; INTRAVENOUS at 06:17

## 2019-03-08 RX ADMIN — INSULIN ASPART 1 UNIT: 100 INJECTION, SOLUTION INTRAVENOUS; SUBCUTANEOUS at 20:57

## 2019-03-08 RX ADMIN — FAMOTIDINE SCH MG: 20 TABLET ORAL at 20:48

## 2019-03-08 RX ADMIN — HYDROCODONE BITARTRATE AND ACETAMINOPHEN 1 TAB: 10; 325 TABLET ORAL at 08:04

## 2019-03-08 NOTE — PN
Date/Time of Note


Date/Time of Note


DATE: 3/8/19 


TIME: 09:29





Subjective


Feels better.  Having flatus.  No BMs.  Pain is well controlled.





Objective


Vitals





Vital Signs


  Date      Temp  Pulse  Resp  B/P (MAP)   Pulse Ox  O2          O2 Flow    FiO2


Time                                                 Delivery    Rate


    3/8/19  97.7     78    17      162/75        97


     07:35                          (104)


    3/7/19                                           Nasal             2.0


     21:00                                           Cannula








Intake and Output





3/7/19


3/7/19


3/8/19





1515:00


23:00


07:00





IntakeIntake Total


812 ml


250 ml





OutputOutput Total


2060 ml


10 ml


40 ml





BalanceBalance


-2060 ml


802 ml


210 ml











Lungs are clear to auscultation bilaterally


Cardiac regular rate and rhythm


Abdomen soft, diffusely tender, normoactive bowel sounds.  Clean dressing


No edema


Neurological nonfocal





Results


Result Diagram:  


3/7/19 0605                                                                     


          3/8/19 0600








Medications


Medications





Current Medications


IV Flush (NS 3 ml) 3 ml PER PROTOCOL IV ;  Start 2/26/19 at 11:00


Acetaminophen (Tylenol Tab) 650 mg Q6H  PRN PO .PAIN 1-3 OR TEMP Last 


administered on 3/7/19at 00:23; Admin Dose 650 MG;  Start 2/26/19 at 11:00


Docusate Sodium (Colace) 100 mg Q12H  PRN PO .CONSTIPATION Last administered on 


3/7/19at 12:44; Admin Dose 100 MG;  Start 2/26/19 at 11:00


Zolpidem Tartrate (Ambien) 5 mg QHS  PRN PO .INSOMNIA;  Start 2/26/19 at 11:00


Carvedilol (Coreg) 3.125 mg BID PO  Last administered on 3/8/19at 08:04; Admin 


Dose 3.125 MG;  Start 2/26/19 at 11:30


Diagnostic Test (Pha) (Accu-Chek) 1 ea 02 XX ;  Start 2/27/19 at 02:00


Miscellaneous Information 1 ea NOTE XX ;  Start 2/26/19 at 12:30


Glucose (Glutose) 15 gm Q15M  PRN PO DECREASED GLUCOSE;  Start 2/26/19 at 12:30


Glucose (Glutose) 22.5 gm Q15M  PRN PO DECREASED GLUCOSE;  Start 2/26/19 at 


12:30


Dextrose (D50w Syringe) 25 ml Q15M  PRN IV DECREASED GLUCOSE;  Start 2/26/19 at 


12:30


Dextrose (D50w Syringe) 50 ml Q15M  PRN IV DECREASED GLUCOSE;  Start 2/26/19 at 


12:30


Glucagon (Glucagen) 1 mg Q15M  PRN IM DECREASED GLUCOSE;  Start 2/26/19 at 12:30


Glucose (Glutose) 15 gm Q15M  PRN BUCCAL DECREASED GLUCOSE;  Start 2/26/19 at 


12:30


Polyethylene Glycol (Miralax) 17 gm BID PO  Last administered on 3/7/19at 21:09;


Admin Dose 17 GM;  Start 2/27/19 at 09:00


Clonidine (Catapres) 0.1 mg Q4H  PRN PO ELEVATED SYSTOLIC BP Last administered 


on 3/8/19at 08:44; Admin Dose 0.1 MG;  Start 2/28/19 at 15:30


Morphine Sulfate (morphine) 6 mg Q4H  PRN PO SEVERE PAIN LEVEL 7-10 Last 


administered on 3/6/19at 16:29; Admin Dose 6 MG;  Start 3/4/19 at 16:00


Morphine Sulfate (morphine) 2 mg Q2H  PRN IV PAIN LEVEL 6-10 Last administered 


on 3/8/19at 06:17; Admin Dose 2 MG;  Start 3/4/19 at 21:00


Acetaminophen/ Hydrocodone Bitart (Norco (5/325)) 1 tab Q6H  PRN PO PAIN LEVEL 


6-10;  Start 3/4/19 at 21:00


Acetaminophen/ Hydrocodone Bitart (Norco (10/325)) 1 tab Q6H  PRN PO PAIN LEVEL 


6-10 Last administered on 3/8/19at 08:04; Admin Dose 1 TAB;  Start 3/4/19 at 


21:00


Diphenhydramine HCl (Benadryl) 25 mg Q6H  PRN IV ITCHING;  Start 3/4/19 at 21:00


Ondansetron HCl (Zofran Inj) 4 mg Q6H  PRN IV NAUSEA AND/OR VOMITING Last 


administered on 3/7/19at 12:51; Admin Dose 4 MG;  Start 3/4/19 at 21:00


Famotidine (Pepcid) 20 mg Q12 PO  Last administered on 3/8/19at 08:04; Admin 


Dose 20 MG;  Start 3/6/19 at 21:00


Insulin Aspart (Novolog Insulin Pen) (Adult SC Insulin - Mild Algorithm)... AC 


MEALS AND  BEDTIME SC ;  Start 3/7/19 at 12:30


Potassium Chloride/Dextrose/ Sod Cl 1,000 ml @  125 mls/hr Q8H IV  Last 


administered on 3/8/19at 03:52; Admin Dose 125 MLS/HR;  Start 3/7/19 at 18:44


Potassium Chloride 100 ml @  50 mls/hr ONCE  ONCE IVPB ;  Start 3/8/19 at 08:00;


 Stop 3/8/19 at 09:59





VTE Prophylaxis


Risk score (from Ns)>0 risk:  3


SCD applied (from Ns):  Yes





Lines/Catheters


IV Catheter Type:  Saline Lock


Tidwell in Place:  No





Assessment/Plan


Assessment/Plan


58-year-old female with recurrent ovarian cancer


Postop day #4 pelvic mass resection, enterolysis, and small bowel resection


Status post bilateral ureteral stent placements preoperatively





Advance diet slowly


Discontinue Tidwell catheter


Ambulate with physical therapy


Discharge planning over the weekend if okay with Dr. Stephanie RAMIREZ, JOY GRECO                   Mar 8, 2019 09:31

## 2019-03-08 NOTE — PN
Date/Time of Note


Date/Time of Note


DATE: 3/8/19 


TIME: 18:09





Assessment/Plan


VTE Prophylaxis


Risk score (from Nsg)>0 risk:  3


SCD applied (from Nsg):  Yes


SCD contraindicated:  other


Pharmacological prophylaxis:  LMWH, other





Lines/Catheters


IV Catheter Type (from Nrsg):  Saline Lock


Urinary Cath still in place:  No





Assessment/Plan


Hospital Course


POD 4, feel better


Assessment/Plan


Ambualte, hopefully advance diet.


Result Diagram:  


3/7/19 0605                                                                     


          3/8/19 0600





Results 24hrs





Laboratory Tests


Test
                     3/7/19
21:11  3/8/19
06:00  3/8/19
08:08  3/8/19
12:19


Bedside Glucose                  120                         137           123


Sodium Level                                   144


Potassium Level                                3.9


Chloride Level                                 109


Carbon Dioxide Level                            29


Anion Gap                                        6


Blood Urea Nitrogen                             5  L


Creatinine                                    0.74


Est Glomerular Filtrat    
             > 60  
       
             



Rate
mL/min


Glucose Level                                  139


Calcium Level                                 6.9  L


Magnesium Level                               2.3  #


Test
                     3/8/19
17:25  
             
             



Bedside Glucose                  100








Subjective


24 Hr Interval Summary


Free Text/Dictation


Feel better, + flatus





Exam/Review of Systems


Exam


Vitals





Vital Signs


  Date      Temp  Pulse  Resp  B/P (MAP)   Pulse Ox  O2          O2 Flow    FiO2


Time                                                 Delivery    Rate


    3/8/19  98.3     77    16      118/61        94


     14:00                           (80)


    3/8/19                                           Nasal             2.0


     09:00                                           Cannula








Intake and Output





3/7/19


3/7/19


3/8/19





1414:59


22:59


06:59





IntakeIntake Total


812 ml


250 ml





OutputOutput Total


2060 ml


10 ml


40 ml





BalanceBalance


-2060 ml


802 ml


210 ml











Gastrointestinal:  soft





Results


Results 24hrs





Laboratory Tests


Test
                     3/7/19
21:11  3/8/19
06:00  3/8/19
08:08  3/8/19
12:19


Bedside Glucose                  120                         137           123


Sodium Level                                   144


Potassium Level                                3.9


Chloride Level                                 109


Carbon Dioxide Level                            29


Anion Gap                                        6


Blood Urea Nitrogen                             5  L


Creatinine                                    0.74


Est Glomerular Filtrat    
             > 60  
       
             



Rate
mL/min


Glucose Level                                  139


Calcium Level                                 6.9  L


Magnesium Level                               2.3  #


Test
                     3/8/19
17:25  
             
             



Bedside Glucose                  100








Medications


Medication





Current Medications


IV Flush (NS 3 ml) 3 ml PER PROTOCOL IV ;  Start 2/26/19 at 11:00


Acetaminophen (Tylenol Tab) 650 mg Q6H  PRN PO .PAIN 1-3 OR TEMP Last 


administered on 3/7/19at 00:23; Admin Dose 650 MG;  Start 2/26/19 at 11:00


Docusate Sodium (Colace) 100 mg Q12H  PRN PO .CONSTIPATION Last administered on 


3/7/19at 12:44; Admin Dose 100 MG;  Start 2/26/19 at 11:00


Zolpidem Tartrate (Ambien) 5 mg QHS  PRN PO .INSOMNIA;  Start 2/26/19 at 11:00


Carvedilol (Coreg) 3.125 mg BID PO  Last administered on 3/8/19at 08:04; Admin 


Dose 3.125 MG;  Start 2/26/19 at 11:30


Diagnostic Test (Pha) (Accu-Chek) 1 ea 02 XX ;  Start 2/27/19 at 02:00


Miscellaneous Information 1 ea NOTE XX ;  Start 2/26/19 at 12:30


Glucose (Glutose) 15 gm Q15M  PRN PO DECREASED GLUCOSE;  Start 2/26/19 at 12:30


Glucose (Glutose) 22.5 gm Q15M  PRN PO DECREASED GLUCOSE;  Start 2/26/19 at 


12:30


Dextrose (D50w Syringe) 25 ml Q15M  PRN IV DECREASED GLUCOSE;  Start 2/26/19 at 


12:30


Dextrose (D50w Syringe) 50 ml Q15M  PRN IV DECREASED GLUCOSE;  Start 2/26/19 at 


12:30


Glucagon (Glucagen) 1 mg Q15M  PRN IM DECREASED GLUCOSE;  Start 2/26/19 at 12:30


Glucose (Glutose) 15 gm Q15M  PRN BUCCAL DECREASED GLUCOSE;  Start 2/26/19 at 


12:30


Polyethylene Glycol (Miralax) 17 gm BID PO  Last administered on 3/8/19at 09:34;


Admin Dose 17 GM;  Start 2/27/19 at 09:00


Clonidine (Catapres) 0.1 mg Q4H  PRN PO ELEVATED SYSTOLIC BP Last administered 


on 3/8/19at 08:44; Admin Dose 0.1 MG;  Start 2/28/19 at 15:30


Morphine Sulfate (morphine) 6 mg Q4H  PRN PO SEVERE PAIN LEVEL 7-10 Last 


administered on 3/6/19at 16:29; Admin Dose 6 MG;  Start 3/4/19 at 16:00


Morphine Sulfate (morphine) 2 mg Q2H  PRN IV PAIN LEVEL 6-10 Last administered 


on 3/8/19at 06:17; Admin Dose 2 MG;  Start 3/4/19 at 21:00


Acetaminophen/ Hydrocodone Bitart (Norco (5/325)) 1 tab Q6H  PRN PO PAIN LEVEL 


6-10;  Start 3/4/19 at 21:00


Acetaminophen/ Hydrocodone Bitart (Norco (10/325)) 1 tab Q6H  PRN PO PAIN LEVEL 


6-10 Last administered on 3/8/19at 08:04; Admin Dose 1 TAB;  Start 3/4/19 at 


21:00


Diphenhydramine HCl (Benadryl) 25 mg Q6H  PRN IV ITCHING;  Start 3/4/19 at 21:00


Ondansetron HCl (Zofran Inj) 4 mg Q6H  PRN IV NAUSEA AND/OR VOMITING Last 


administered on 3/7/19at 12:51; Admin Dose 4 MG;  Start 3/4/19 at 21:00


Famotidine (Pepcid) 20 mg Q12 PO  Last administered on 3/8/19at 08:04; Admin 


Dose 20 MG;  Start 3/6/19 at 21:00


Insulin Aspart (Novolog Insulin Pen) (Adult SC Insulin - Mild Algorithm)... AC 


MEALS AND  BEDTIME SC ;  Start 3/7/19 at 12:30


Potassium Chloride/Dextrose/ Sod Cl 1,000 ml @  75 mls/hr [id number] IV  Last 


administered on 3/8/19at 17:21; Admin Dose 75 MLS/HR;  Start 3/8/19 at 13:00











KRISH GUAJARDO MD                Mar 8, 2019 18:10

## 2019-03-08 NOTE — CONS
Assessment/Plan


Assessment/Plan


Assessment/Plan (Daily)


1. Acute kidney injury due to possible prerenal azotemia + possibely obstruction


from mass


2.  H/o Uterine CA s/p hysterectomy, now concerned about recurrence of tumour 


3. pelvis mass, pssible recurrence 


4. Bilateral Hydronephrosis Cystoscopy and insertion of bilateral ureteral JJ 


stents on 3/1/19 by Dr. Han  


5. H/O HTN


6. H/o DM II





Plan:


Cystoscopy and insertion of bilateral ureteral JJ stents on 3/1/19 by Dr. Han, BUN/Cr improved to 5/0.74- Other electrolytes stable 


s/p GYN oncology consult -s/p Exp Lap, Resection of pelvic tumour and Small 


bowel on 3/4/18- doing well


IVF D5NS with KCL at 75 cc/hr


will follow up





Consultation Date/Type/Reason


Admit Date/Time


Feb 26, 2019 at 10:49


Initial Consult Date


2/26/19


Type of Consult


NEPHROLOGY


Requesting Provider:  JOY RAMIREZ MD


Date/Time of Note


DATE: 3/8/19 


TIME: 07:20





Exam/Review of Systems


Exam


Vitals





Vital Signs


  Date      Temp  Pulse  Resp  B/P (MAP)   Pulse Ox  O2          O2 Flow    FiO2


Time                                                 Delivery    Rate


    3/8/19  98.7     82    19      140/67        95


     02:00                           (91)


    3/7/19                                           Nasal             2.0


     21:00                                           Cannula








Intake and Output





3/7/19


3/7/19


3/8/19





1515:00


23:00


07:00





IntakeIntake Total


812 ml


250 ml





OutputOutput Total


2060 ml


10 ml


40 ml





BalanceBalance


-2060 ml


802 ml


210 ml











Exam


Constitutional:  alert


Respiratory:  clear to auscultation, normal air movement


Cardiovascular:  regular rate and rhythm, nl pulses


Gastrointestinal:  soft, non-tender


Musculoskeletal:  nl extremities to inspection


Extremities:  normal pulses


Neurological:  CNS II-XII intact, nl mental status, nl speech, nl strength





Results


Result Diagram:  


3/7/19 0605                                                                     


          3/7/19 0605





Results 24hrs





Laboratory Tests


    Test
            3/7/19
08:37  3/7/19
12:09  3/7/19
17:14  3/7/19
21:11


    Bedside Glucose         130           112           104           120








Medications


Medication





Current Medications


IV Flush (NS 3 ml) 3 ml PER PROTOCOL IV ;  Start 2/26/19 at 11:00


Acetaminophen (Tylenol Tab) 650 mg Q6H  PRN PO .PAIN 1-3 OR TEMP Last 


administered on 3/7/19at 00:23; Admin Dose 650 MG;  Start 2/26/19 at 11:00


Docusate Sodium (Colace) 100 mg Q12H  PRN PO .CONSTIPATION Last administered on 


3/7/19at 12:44; Admin Dose 100 MG;  Start 2/26/19 at 11:00


Zolpidem Tartrate (Ambien) 5 mg QHS  PRN PO .INSOMNIA;  Start 2/26/19 at 11:00


Carvedilol (Coreg) 3.125 mg BID PO  Last administered on 3/7/19at 21:09; Admin 


Dose 3.125 MG;  Start 2/26/19 at 11:30


Diagnostic Test (Pha) (Accu-Chek) 1 ea 02 XX ;  Start 2/27/19 at 02:00


Miscellaneous Information 1 ea NOTE XX ;  Start 2/26/19 at 12:30


Glucose (Glutose) 15 gm Q15M  PRN PO DECREASED GLUCOSE;  Start 2/26/19 at 12:30


Glucose (Glutose) 22.5 gm Q15M  PRN PO DECREASED GLUCOSE;  Start 2/26/19 at 


12:30


Dextrose (D50w Syringe) 25 ml Q15M  PRN IV DECREASED GLUCOSE;  Start 2/26/19 at 


12:30


Dextrose (D50w Syringe) 50 ml Q15M  PRN IV DECREASED GLUCOSE;  Start 2/26/19 at 


12:30


Glucagon (Glucagen) 1 mg Q15M  PRN IM DECREASED GLUCOSE;  Start 2/26/19 at 12:30


Glucose (Glutose) 15 gm Q15M  PRN BUCCAL DECREASED GLUCOSE;  Start 2/26/19 at 


12:30


Polyethylene Glycol (Miralax) 17 gm BID PO  Last administered on 3/7/19at 21:09;


Admin Dose 17 GM;  Start 2/27/19 at 09:00


Clonidine (Catapres) 0.1 mg Q4H  PRN PO ELEVATED SYSTOLIC BP Last administered 


on 2/28/19at 20:40; Admin Dose 0.1 MG;  Start 2/28/19 at 15:30


Morphine Sulfate (morphine) 6 mg Q4H  PRN PO SEVERE PAIN LEVEL 7-10 Last 


administered on 3/6/19at 16:29; Admin Dose 6 MG;  Start 3/4/19 at 16:00


Morphine Sulfate (morphine) 2 mg Q2H  PRN IV PAIN LEVEL 6-10 Last administered 


on 3/8/19at 06:17; Admin Dose 2 MG;  Start 3/4/19 at 21:00


Acetaminophen/ Hydrocodone Bitart (Norco (5/325)) 1 tab Q6H  PRN PO PAIN LEVEL 


6-10;  Start 3/4/19 at 21:00


Acetaminophen/ Hydrocodone Bitart (Norco (10/325)) 1 tab Q6H  PRN PO PAIN LEVEL 


6-10 Last administered on 3/7/19at 17:22; Admin Dose 1 TAB;  Start 3/4/19 at 


21:00


Diphenhydramine HCl (Benadryl) 25 mg Q6H  PRN IV ITCHING;  Start 3/4/19 at 21:00


Ondansetron HCl (Zofran Inj) 4 mg Q6H  PRN IV NAUSEA AND/OR VOMITING Last 


administered on 3/7/19at 12:51; Admin Dose 4 MG;  Start 3/4/19 at 21:00


Enoxaparin Sodium (Lovenox) 40 mg DAILY@07 SC  Last administered on 3/7/19at 


06:07; Admin Dose 40 MG;  Start 3/6/19 at 07:00


Famotidine (Pepcid) 20 mg Q12 PO  Last administered on 3/7/19at 21:09; Admin 


Dose 20 MG;  Start 3/6/19 at 21:00


Insulin Aspart (Novolog Insulin Pen) (Adult SC Insulin - Mild Algorithm)... AC 


MEALS AND  BEDTIME SC ;  Start 3/7/19 at 12:30


Potassium Chloride/Dextrose/ Sod Cl 1,000 ml @  125 mls/hr Q8H IV  Last 


administered on 3/8/19at 03:52; Admin Dose 125 MLS/HR;  Start 3/7/19 at 18:44











MARGIE GODINEZ MD            Mar 8, 2019 07:21

## 2019-03-09 VITALS — DIASTOLIC BLOOD PRESSURE: 81 MMHG | SYSTOLIC BLOOD PRESSURE: 186 MMHG | HEART RATE: 75 BPM | RESPIRATION RATE: 18 BRPM

## 2019-03-09 VITALS — RESPIRATION RATE: 18 BRPM | SYSTOLIC BLOOD PRESSURE: 151 MMHG | HEART RATE: 70 BPM | DIASTOLIC BLOOD PRESSURE: 67 MMHG

## 2019-03-09 VITALS — RESPIRATION RATE: 18 BRPM | SYSTOLIC BLOOD PRESSURE: 145 MMHG | HEART RATE: 77 BPM | DIASTOLIC BLOOD PRESSURE: 79 MMHG

## 2019-03-09 VITALS — HEART RATE: 72 BPM | DIASTOLIC BLOOD PRESSURE: 76 MMHG | SYSTOLIC BLOOD PRESSURE: 181 MMHG | RESPIRATION RATE: 18 BRPM

## 2019-03-09 VITALS — SYSTOLIC BLOOD PRESSURE: 159 MMHG | DIASTOLIC BLOOD PRESSURE: 72 MMHG | HEART RATE: 68 BPM

## 2019-03-09 VITALS — HEART RATE: 66 BPM | DIASTOLIC BLOOD PRESSURE: 64 MMHG | SYSTOLIC BLOOD PRESSURE: 140 MMHG

## 2019-03-09 VITALS — DIASTOLIC BLOOD PRESSURE: 67 MMHG | SYSTOLIC BLOOD PRESSURE: 151 MMHG | HEART RATE: 67 BPM

## 2019-03-09 RX ADMIN — FAMOTIDINE 1 MG: 20 TABLET ORAL at 19:46

## 2019-03-09 RX ADMIN — ASCORBIC ACID, VITAMIN A PALMITATE, CHOLECALCIFEROL, THIAMINE HYDROCHLORIDE, RIBOFLAVIN-5 PHOSPHATE SODIUM, PYRIDOXINE HYDROCHLORIDE, NIACINAMIDE, DEXPANTHENOL, ALPHA-TOCOPHEROL ACETATE, VITAMIN K1, FOLIC ACID, BIOTIN, CYANOCOBALAMIN 1 MLS/HR: 200; 3300; 200; 6; 3.6; 6; 40; 15; 10; 150; 600; 60; 5 INJECTION, SOLUTION INTRAVENOUS at 18:21

## 2019-03-09 RX ADMIN — ASCORBIC ACID, VITAMIN A PALMITATE, CHOLECALCIFEROL, THIAMINE HYDROCHLORIDE, RIBOFLAVIN-5 PHOSPHATE SODIUM, PYRIDOXINE HYDROCHLORIDE, NIACINAMIDE, DEXPANTHENOL, ALPHA-TOCOPHEROL ACETATE, VITAMIN K1, FOLIC ACID, BIOTIN, CYANOCOBALAMIN 1 MLS/HR: 200; 3300; 200; 6; 3.6; 6; 40; 15; 10; 150; 600; 60; 5 INJECTION, SOLUTION INTRAVENOUS at 05:25

## 2019-03-09 RX ADMIN — INSULIN ASPART 1 UNIT: 100 INJECTION, SOLUTION INTRAVENOUS; SUBCUTANEOUS at 17:08

## 2019-03-09 RX ADMIN — POLYETHYLENE GLYCOL 3350 SCH GM: 17 POWDER, FOR SOLUTION ORAL at 08:35

## 2019-03-09 RX ADMIN — ZOLPIDEM TARTRATE 1 MG: 5 TABLET, FILM COATED ORAL at 23:12

## 2019-03-09 RX ADMIN — POLYETHYLENE GLYCOL 3350 1 GM: 17 POWDER, FOR SOLUTION ORAL at 19:46

## 2019-03-09 RX ADMIN — POLYETHYLENE GLYCOL 3350 1 GM: 17 POWDER, FOR SOLUTION ORAL at 08:39

## 2019-03-09 RX ADMIN — FAMOTIDINE 1 MG: 20 TABLET ORAL at 08:35

## 2019-03-09 RX ADMIN — POLYETHYLENE GLYCOL 3350 1 GM: 17 POWDER, FOR SOLUTION ORAL at 08:35

## 2019-03-09 RX ADMIN — INSULIN ASPART 1 UNIT: 100 INJECTION, SOLUTION INTRAVENOUS; SUBCUTANEOUS at 08:36

## 2019-03-09 RX ADMIN — POLYETHYLENE GLYCOL 3350 SCH GM: 17 POWDER, FOR SOLUTION ORAL at 08:39

## 2019-03-09 RX ADMIN — ASCORBIC ACID, VITAMIN A PALMITATE, CHOLECALCIFEROL, THIAMINE HYDROCHLORIDE, RIBOFLAVIN-5 PHOSPHATE SODIUM, PYRIDOXINE HYDROCHLORIDE, NIACINAMIDE, DEXPANTHENOL, ALPHA-TOCOPHEROL ACETATE, VITAMIN K1, FOLIC ACID, BIOTIN, CYANOCOBALAMIN SCH MLS/HR: 200; 3300; 200; 6; 3.6; 6; 40; 15; 10; 150; 600; 60; 5 INJECTION, SOLUTION INTRAVENOUS at 18:21

## 2019-03-09 RX ADMIN — HYDROCODONE BITARTRATE AND ACETAMINOPHEN 1 TAB: 10; 325 TABLET ORAL at 19:49

## 2019-03-09 RX ADMIN — ZOLPIDEM TARTRATE PRN MG: 5 TABLET, FILM COATED ORAL at 23:12

## 2019-03-09 RX ADMIN — ASCORBIC ACID, VITAMIN A PALMITATE, CHOLECALCIFEROL, THIAMINE HYDROCHLORIDE, RIBOFLAVIN-5 PHOSPHATE SODIUM, PYRIDOXINE HYDROCHLORIDE, NIACINAMIDE, DEXPANTHENOL, ALPHA-TOCOPHEROL ACETATE, VITAMIN K1, FOLIC ACID, BIOTIN, CYANOCOBALAMIN SCH MLS/HR: 200; 3300; 200; 6; 3.6; 6; 40; 15; 10; 150; 600; 60; 5 INJECTION, SOLUTION INTRAVENOUS at 05:25

## 2019-03-09 RX ADMIN — ACETAMINOPHEN 1 MG: 325 TABLET, FILM COATED ORAL at 16:09

## 2019-03-09 RX ADMIN — FAMOTIDINE SCH MG: 20 TABLET ORAL at 19:46

## 2019-03-09 RX ADMIN — INSULIN ASPART 1 UNIT: 100 INJECTION, SOLUTION INTRAVENOUS; SUBCUTANEOUS at 11:30

## 2019-03-09 RX ADMIN — POLYETHYLENE GLYCOL 3350 SCH GM: 17 POWDER, FOR SOLUTION ORAL at 19:46

## 2019-03-09 RX ADMIN — INSULIN ASPART 1 UNIT: 100 INJECTION, SOLUTION INTRAVENOUS; SUBCUTANEOUS at 21:00

## 2019-03-09 RX ADMIN — FAMOTIDINE SCH MG: 20 TABLET ORAL at 08:35

## 2019-03-09 RX ADMIN — MORPHINE SULFATE PRN MG: 2 INJECTION, SOLUTION INTRAMUSCULAR; INTRAVENOUS at 05:22

## 2019-03-09 RX ADMIN — MORPHINE SULFATE 1 MG: 2 INJECTION, SOLUTION INTRAMUSCULAR; INTRAVENOUS at 05:22

## 2019-03-09 RX ADMIN — HYDROCODONE BITARTRATE AND ACETAMINOPHEN 1 TAB: 10; 325 TABLET ORAL at 08:36

## 2019-03-09 NOTE — PN
Date/Time of Note


Date/Time of Note


DATE: 3/9/19 


TIME: 11:10





Assessment/Plan


VTE Prophylaxis


Risk score (from Ns)>0 risk:  7


SCD applied (from Ns):  Yes


Pharmacological prophylaxis:  LMWH





Lines/Catheters


IV Catheter Type (from Nrsg):  Peripheral IV


Urinary Cath still in place:  No





Assessment/Plan


Assessment/Plan


1. gyn: s/p ex lap and removal of pelvc mass, POD #4





2. Gi: post op ileus, slowly resolving





3. renal: acute kidney injury relatd to dehydration and obstruction, now resolve


d


(b) POD #8 s/p doule j stent


Result Diagram:  


3/7/19 0605                                                                     


          3/8/19 0600





Results 24hrs





Laboratory Tests


    Test
            3/8/19
12:19  3/8/19
17:25  3/8/19
20:47  3/9/19
08:18


    Bedside Glucose         123           100            96           109








Subjective


24 Hr Interval Summary


Free Text/Dictation


no complaints, tolearted only OJ this am


passing flatus and using toilet





Exam/Review of Systems


Exam


Vitals





Vital Signs


  Date      Temp  Pulse  Resp  B/P (MAP)   Pulse Ox  O2          O2 Flow    FiO2


Time                                                 Delivery    Rate


    3/9/19           68            159/72


     10:32                          (101)


    3/9/19  98.1           18                    96  Room Air


     08:00


    3/8/19                                                             2.0


     20:00








Intake and Output





3/8/19


3/8/19


3/9/19





1515:00


23:00


07:00





IntakeIntake Total


50 ml


1110 ml


1000 ml





OutputOutput Total


1615 ml


15 ml





BalanceBalance


-1565 ml


1110 ml


985 ml











Exam


na, ctab, soft min tender





Results


Results 24hrs





Laboratory Tests


    Test
            3/8/19
12:19  3/8/19
17:25  3/8/19
20:47  3/9/19
08:18


    Bedside Glucose         123           100            96           109








Medications


Medication





Current Medications


IV Flush (NS 3 ml) 3 ml PER PROTOCOL IV ;  Start 2/26/19 at 11:00


Acetaminophen (Tylenol Tab) 650 mg Q6H  PRN PO .PAIN 1-3 OR TEMP Last 


administered on 3/7/19at 00:23; Admin Dose 650 MG;  Start 2/26/19 at 11:00


Docusate Sodium (Colace) 100 mg Q12H  PRN PO .CONSTIPATION Last administered on 


3/7/19at 12:44; Admin Dose 100 MG;  Start 2/26/19 at 11:00


Zolpidem Tartrate (Ambien) 5 mg QHS  PRN PO .INSOMNIA;  Start 2/26/19 at 11:00


Carvedilol (Coreg) 3.125 mg BID PO  Last administered on 3/9/19at 08:35; Admin 


Dose 3.125 MG;  Start 2/26/19 at 11:30


Diagnostic Test (Pha) (Accu-Chek) 1 ea 02 XX ;  Start 2/27/19 at 02:00


Miscellaneous Information 1 ea NOTE XX ;  Start 2/26/19 at 12:30


Glucose (Glutose) 15 gm Q15M  PRN PO DECREASED GLUCOSE;  Start 2/26/19 at 12:30


Glucose (Glutose) 22.5 gm Q15M  PRN PO DECREASED GLUCOSE;  Start 2/26/19 at 


12:30


Dextrose (D50w Syringe) 25 ml Q15M  PRN IV DECREASED GLUCOSE;  Start 2/26/19 at 


12:30


Dextrose (D50w Syringe) 50 ml Q15M  PRN IV DECREASED GLUCOSE;  Start 2/26/19 at 


12:30


Glucagon (Glucagen) 1 mg Q15M  PRN IM DECREASED GLUCOSE;  Start 2/26/19 at 12:30


Glucose (Glutose) 15 gm Q15M  PRN BUCCAL DECREASED GLUCOSE;  Start 2/26/19 at 


12:30


Polyethylene Glycol (Miralax) 17 gm BID PO  Last administered on 3/8/19at 20:48;


Admin Dose 17 GM;  Start 2/27/19 at 09:00


Clonidine (Catapres) 0.1 mg Q4H  PRN PO ELEVATED SYSTOLIC BP Last administered 


on 3/8/19at 08:44; Admin Dose 0.1 MG;  Start 2/28/19 at 15:30


Morphine Sulfate (morphine) 6 mg Q4H  PRN PO SEVERE PAIN LEVEL 7-10 Last 


administered on 3/6/19at 16:29; Admin Dose 6 MG;  Start 3/4/19 at 16:00


Morphine Sulfate (morphine) 2 mg Q2H  PRN IV PAIN LEVEL 6-10 Last administered 


on 3/9/19at 05:22; Admin Dose 2 MG;  Start 3/4/19 at 21:00


Acetaminophen/ Hydrocodone Bitart (Norco (5/325)) 1 tab Q6H  PRN PO PAIN LEVEL 


6-10;  Start 3/4/19 at 21:00


Acetaminophen/ Hydrocodone Bitart (Norco (10/325)) 1 tab Q6H  PRN PO PAIN LEVEL 


6-10 Last administered on 3/9/19at 08:36; Admin Dose 1 TAB;  Start 3/4/19 at 


21:00


Diphenhydramine HCl (Benadryl) 25 mg Q6H  PRN IV ITCHING;  Start 3/4/19 at 21:00


Ondansetron HCl (Zofran Inj) 4 mg Q6H  PRN IV NAUSEA AND/OR VOMITING Last 


administered on 3/7/19at 12:51; Admin Dose 4 MG;  Start 3/4/19 at 21:00


Famotidine (Pepcid) 20 mg Q12 PO  Last administered on 3/9/19at 08:35; Admin 


Dose 20 MG;  Start 3/6/19 at 21:00


Insulin Aspart (Novolog Insulin Pen) (Adult SC Insulin - Mild Algorithm)... AC 


MEALS AND  BEDTIME SC ;  Start 3/7/19 at 12:30


Potassium Chloride/Dextrose/ Sod Cl 1,000 ml @  75 mls/hr [id number] IV  Last 


administered on 3/9/19at 05:25; Admin Dose 75 MLS/HR;  Start 3/8/19 at 13:00











SCOTT GOLDSMITH MD          Mar 9, 2019 11:12

## 2019-03-09 NOTE — PN
Date/Time of Note


Date/Time of Note


DATE: 3/9/19 


TIME: 10:41





Assessment/Plan


VTE Prophylaxis


Risk score (from Ns)>0 risk:  7


SCD applied (from Ns):  Yes


SCD contraindicated:  other


Pharmacological prophylaxis:  LMWH, other





Lines/Catheters


IV Catheter Type (from Nrsg):  Peripheral IV


Urinary Cath still in place:  No





Assessment/Plan


Hospital Course


POD 5, feel better


Assessment/Plan


Advance diet


Result Diagram:  


3/7/19 0605                                                                     


          3/8/19 0600





Results 24hrs





Laboratory Tests


    Test
            3/8/19
12:19  3/8/19
17:25  3/8/19
20:47  3/9/19
08:18


    Bedside Glucose         123           100            96           109








Subjective


24 Hr Interval Summary


Free Text/Dictation


doing better, + flatus





Exam/Review of Systems


Exam


Vitals





Vital Signs


  Date      Temp  Pulse  Resp  B/P (MAP)   Pulse Ox  O2          O2 Flow    FiO2


Time                                                 Delivery    Rate


    3/9/19           68            159/72


     10:32                          (101)


    3/9/19  98.1           18                    96  Room Air


     08:00


    3/8/19                                                             2.0


     20:00








Intake and Output





3/8/19


3/8/19


3/9/19





1515:00


23:00


07:00





IntakeIntake Total


50 ml


1110 ml


1000 ml





OutputOutput Total


1615 ml


15 ml





BalanceBalance


-1565 ml


1110 ml


985 ml











Gastrointestinal:  soft





Results


Results 24hrs





Laboratory Tests


    Test
            3/8/19
12:19  3/8/19
17:25  3/8/19
20:47  3/9/19
08:18


    Bedside Glucose         123           100            96           109








Medications


Medication





Current Medications


IV Flush (NS 3 ml) 3 ml PER PROTOCOL IV ;  Start 2/26/19 at 11:00


Acetaminophen (Tylenol Tab) 650 mg Q6H  PRN PO .PAIN 1-3 OR TEMP Last 


administered on 3/7/19at 00:23; Admin Dose 650 MG;  Start 2/26/19 at 11:00


Docusate Sodium (Colace) 100 mg Q12H  PRN PO .CONSTIPATION Last administered on 


3/7/19at 12:44; Admin Dose 100 MG;  Start 2/26/19 at 11:00


Zolpidem Tartrate (Ambien) 5 mg QHS  PRN PO .INSOMNIA;  Start 2/26/19 at 11:00


Carvedilol (Coreg) 3.125 mg BID PO  Last administered on 3/9/19at 08:35; Admin 


Dose 3.125 MG;  Start 2/26/19 at 11:30


Diagnostic Test (Pha) (Accu-Chek) 1 ea 02 XX ;  Start 2/27/19 at 02:00


Miscellaneous Information 1 ea NOTE XX ;  Start 2/26/19 at 12:30


Glucose (Glutose) 15 gm Q15M  PRN PO DECREASED GLUCOSE;  Start 2/26/19 at 12:30


Glucose (Glutose) 22.5 gm Q15M  PRN PO DECREASED GLUCOSE;  Start 2/26/19 at 


12:30


Dextrose (D50w Syringe) 25 ml Q15M  PRN IV DECREASED GLUCOSE;  Start 2/26/19 at 


12:30


Dextrose (D50w Syringe) 50 ml Q15M  PRN IV DECREASED GLUCOSE;  Start 2/26/19 at 


12:30


Glucagon (Glucagen) 1 mg Q15M  PRN IM DECREASED GLUCOSE;  Start 2/26/19 at 12:30


Glucose (Glutose) 15 gm Q15M  PRN BUCCAL DECREASED GLUCOSE;  Start 2/26/19 at 


12:30


Polyethylene Glycol (Miralax) 17 gm BID PO  Last administered on 3/8/19at 20:48;


Admin Dose 17 GM;  Start 2/27/19 at 09:00


Clonidine (Catapres) 0.1 mg Q4H  PRN PO ELEVATED SYSTOLIC BP Last administered 


on 3/8/19at 08:44; Admin Dose 0.1 MG;  Start 2/28/19 at 15:30


Morphine Sulfate (morphine) 6 mg Q4H  PRN PO SEVERE PAIN LEVEL 7-10 Last 


administered on 3/6/19at 16:29; Admin Dose 6 MG;  Start 3/4/19 at 16:00


Morphine Sulfate (morphine) 2 mg Q2H  PRN IV PAIN LEVEL 6-10 Last administered 


on 3/9/19at 05:22; Admin Dose 2 MG;  Start 3/4/19 at 21:00


Acetaminophen/ Hydrocodone Bitart (Norco (5/325)) 1 tab Q6H  PRN PO PAIN LEVEL 


6-10;  Start 3/4/19 at 21:00


Acetaminophen/ Hydrocodone Bitart (Norco (10/325)) 1 tab Q6H  PRN PO PAIN LEVEL 


6-10 Last administered on 3/9/19at 08:36; Admin Dose 1 TAB;  Start 3/4/19 at 


21:00


Diphenhydramine HCl (Benadryl) 25 mg Q6H  PRN IV ITCHING;  Start 3/4/19 at 21:00


Ondansetron HCl (Zofran Inj) 4 mg Q6H  PRN IV NAUSEA AND/OR VOMITING Last 


administered on 3/7/19at 12:51; Admin Dose 4 MG;  Start 3/4/19 at 21:00


Famotidine (Pepcid) 20 mg Q12 PO  Last administered on 3/9/19at 08:35; Admin 


Dose 20 MG;  Start 3/6/19 at 21:00


Insulin Aspart (Novolog Insulin Pen) (Adult SC Insulin - Mild Algorithm)... AC 


MEALS AND  BEDTIME SC ;  Start 3/7/19 at 12:30


Potassium Chloride/Dextrose/ Sod Cl 1,000 ml @  75 mls/hr [id number] IV  Last 


administered on 3/9/19at 05:25; Admin Dose 75 MLS/HR;  Start 3/8/19 at 13:00











KRISH GUAJARDO MD                Mar 9, 2019 10:42

## 2019-03-09 NOTE — CONS
Assessment/Plan


Assessment/Plan


Assessment/Plan (Daily)


1. Acute kidney injury due to possible prerenal azotemia + possibly obstruction 


from mass


2.  H/o Uterine CA s/p hysterectomy, now concerned about recurrence of tumour 


3. pelvis mass, pssible recurrence 


4. Bilateral Hydronephrosis Cystoscopy and insertion of bilateral ureteral JJ 


stents on 3/1/19 by Dr. Han  


5. H/O HTN


6. H/o DM II





Plan:


Cystoscopy and insertion of bilateral ureteral JJ stents on 3/1/19 by Dr. Han, BUN/Cr improved to 5/0.74- Other electrolytes stable 


UO - 1.6L/24hr


BUN/Cr- 5/0,74;


s/p GYN oncology consult -s/p Exp Lap, Resection of pelvic tumour and Small 


bowel on 3/4/18- doing well


IVF D5NS with KCL at 75 cc/hr


will follow up


Patient seen in collaboration with Dr ARAMIS Gomes. Dw staff





Consultation Date/Type/Reason


Admit Date/Time


Feb 26, 2019 at 10:49


Initial Consult Date


2/28/19


Type of Consult


NEPHROLOGY


Requesting Provider:  JOY RAMIREZ MD


Date/Time of Note


DATE: 3/9/19 


TIME: 14:43





24 HR Interval Summary


Free Text/Dictation


-nad-


On Mechanical soft diet- tolerating diet


- UO- 1.6 L/24 hrs


no new issues reported last night


Constitutional:  improved, requiring IVF





Detailed Summary


Eyes:  no complaints


ENT:  no complaints


Respiratory:  no complaints


Cardiovascular:  no complaints


Gastrointestinal:  pain


Genitourinary:  no complaints


Musculoskeletal:  no complaints


Skin:  no complaints


Neurologic:  no complaints


Endocrine:  no complaints


Lymphatic:  no complaints





Exam/Review of Systems


Exam


Vitals





Vital Signs


  Date      Temp  Pulse  Resp  B/P (MAP)   Pulse Ox  O2          O2 Flow    FiO2


Time                                                 Delivery    Rate


    3/9/19           68            159/72


     10:32                          (101)


    3/9/19  98.1           18                    96  Room Air


     08:00


    3/8/19                                                             2.0


     20:00








Intake and Output





3/8/19


3/8/19


3/9/19





1515:00


23:00


07:00





IntakeIntake Total


50 ml


1110 ml


1000 ml





OutputOutput Total


1615 ml


15 ml





BalanceBalance


-1565 ml


1110 ml


985 ml











Constitutional:  alert, well developed


Psych:  nl mood/affect


Head:  atraumatic


Eyes:  nl lids, nl sclera


ENMT:  nl external ears & nose


Respiratory:  clear to auscultation


Cardiovascular:  nl pulses


Gastrointestinal:  soft, non-tender, other (surgical abdomen)


Musculoskeletal:  nl extremities to inspection


Extremities:  normal pulses


Neurological:  nl speech


Lymph:  nontender





Results


Result Diagram:  


3/7/19 0605                                                                     


          3/8/19 0600





Results 24hrs





Laboratory Tests


    Test
            3/8/19
17:25  3/8/19
20:47  3/9/19
08:18  3/9/19
12:13


    Bedside Glucose         100            96           109           105








Medications


Medication





Current Medications


IV Flush (NS 3 ml) 3 ml PER PROTOCOL IV ;  Start 2/26/19 at 11:00


Acetaminophen (Tylenol Tab) 650 mg Q6H  PRN PO .PAIN 1-3 OR TEMP Last 


administered on 3/7/19at 00:23; Admin Dose 650 MG;  Start 2/26/19 at 11:00


Docusate Sodium (Colace) 100 mg Q12H  PRN PO .CONSTIPATION Last administered on 


3/7/19at 12:44; Admin Dose 100 MG;  Start 2/26/19 at 11:00


Zolpidem Tartrate (Ambien) 5 mg QHS  PRN PO .INSOMNIA;  Start 2/26/19 at 11:00


Carvedilol (Coreg) 3.125 mg BID PO  Last administered on 3/9/19at 08:35; Admin 


Dose 3.125 MG;  Start 2/26/19 at 11:30


Diagnostic Test (Pha) (Accu-Chek) 1 ea 02 XX ;  Start 2/27/19 at 02:00


Miscellaneous Information 1 ea NOTE XX ;  Start 2/26/19 at 12:30


Glucose (Glutose) 15 gm Q15M  PRN PO DECREASED GLUCOSE;  Start 2/26/19 at 12:30


Glucose (Glutose) 22.5 gm Q15M  PRN PO DECREASED GLUCOSE;  Start 2/26/19 at 


12:30


Dextrose (D50w Syringe) 25 ml Q15M  PRN IV DECREASED GLUCOSE;  Start 2/26/19 at 


12:30


Dextrose (D50w Syringe) 50 ml Q15M  PRN IV DECREASED GLUCOSE;  Start 2/26/19 at 


12:30


Glucagon (Glucagen) 1 mg Q15M  PRN IM DECREASED GLUCOSE;  Start 2/26/19 at 12:30


Glucose (Glutose) 15 gm Q15M  PRN BUCCAL DECREASED GLUCOSE;  Start 2/26/19 at 


12:30


Polyethylene Glycol (Miralax) 17 gm BID PO  Last administered on 3/8/19at 20:48;


Admin Dose 17 GM;  Start 2/27/19 at 09:00


Clonidine (Catapres) 0.1 mg Q4H  PRN PO ELEVATED SYSTOLIC BP Last administered 


on 3/8/19at 08:44; Admin Dose 0.1 MG;  Start 2/28/19 at 15:30


Morphine Sulfate (morphine) 6 mg Q4H  PRN PO SEVERE PAIN LEVEL 7-10 Last 


administered on 3/6/19at 16:29; Admin Dose 6 MG;  Start 3/4/19 at 16:00


Morphine Sulfate (morphine) 2 mg Q2H  PRN IV PAIN LEVEL 6-10 Last administered 


on 3/9/19at 05:22; Admin Dose 2 MG;  Start 3/4/19 at 21:00


Acetaminophen/ Hydrocodone Bitart (Norco (5/325)) 1 tab Q6H  PRN PO PAIN LEVEL 


6-10;  Start 3/4/19 at 21:00


Acetaminophen/ Hydrocodone Bitart (Norco (10/325)) 1 tab Q6H  PRN PO PAIN LEVEL 


6-10 Last administered on 3/9/19at 08:36; Admin Dose 1 TAB;  Start 3/4/19 at 


21:00


Diphenhydramine HCl (Benadryl) 25 mg Q6H  PRN IV ITCHING;  Start 3/4/19 at 21:00


Ondansetron HCl (Zofran Inj) 4 mg Q6H  PRN IV NAUSEA AND/OR VOMITING Last 


administered on 3/7/19at 12:51; Admin Dose 4 MG;  Start 3/4/19 at 21:00


Famotidine (Pepcid) 20 mg Q12 PO  Last administered on 3/9/19at 08:35; Admin 


Dose 20 MG;  Start 3/6/19 at 21:00


Insulin Aspart (Novolog Insulin Pen) (Adult SC Insulin - Mild Algorithm)... AC 


MEALS AND  BEDTIME SC ;  Start 3/7/19 at 12:30


Potassium Chloride/Dextrose/ Sod Cl 1,000 ml @  75 mls/hr [id number] IV  Last 


administered on 3/9/19at 05:25; Admin Dose 75 MLS/HR;  Start 3/8/19 at 13:00











MEG GE                  Mar 9, 2019 14:45

## 2019-03-10 VITALS — RESPIRATION RATE: 18 BRPM | SYSTOLIC BLOOD PRESSURE: 179 MMHG | HEART RATE: 74 BPM | DIASTOLIC BLOOD PRESSURE: 74 MMHG

## 2019-03-10 VITALS — HEART RATE: 68 BPM | SYSTOLIC BLOOD PRESSURE: 153 MMHG | DIASTOLIC BLOOD PRESSURE: 75 MMHG | RESPIRATION RATE: 18 BRPM

## 2019-03-10 VITALS — SYSTOLIC BLOOD PRESSURE: 174 MMHG | HEART RATE: 73 BPM | DIASTOLIC BLOOD PRESSURE: 85 MMHG

## 2019-03-10 VITALS — DIASTOLIC BLOOD PRESSURE: 53 MMHG | HEART RATE: 76 BPM | SYSTOLIC BLOOD PRESSURE: 110 MMHG

## 2019-03-10 VITALS — HEART RATE: 77 BPM | SYSTOLIC BLOOD PRESSURE: 128 MMHG | DIASTOLIC BLOOD PRESSURE: 62 MMHG | RESPIRATION RATE: 18 BRPM

## 2019-03-10 VITALS — SYSTOLIC BLOOD PRESSURE: 179 MMHG | DIASTOLIC BLOOD PRESSURE: 85 MMHG | HEART RATE: 73 BPM

## 2019-03-10 VITALS — DIASTOLIC BLOOD PRESSURE: 66 MMHG | SYSTOLIC BLOOD PRESSURE: 142 MMHG

## 2019-03-10 LAB
ADD MAN DIFF?: NO
ANION GAP: 8 (ref 5–13)
BASOPHIL #: 0.1 10^3/UL (ref 0–0.1)
BASOPHILS %: 1.1 % (ref 0–2)
BLOOD UREA NITROGEN: 7 MG/DL (ref 7–20)
CALCIUM: 8 MG/DL (ref 8.4–10.2)
CARBON DIOXIDE: 27 MMOL/L (ref 21–31)
CHLORIDE: 107 MMOL/L (ref 97–110)
CREATININE: 0.79 MG/DL (ref 0.44–1)
EOSINOPHILS #: 0.3 10^3/UL (ref 0–0.5)
EOSINOPHILS %: 5.2 % (ref 0–7)
GLUCOSE: 113 MG/DL (ref 70–220)
HEMATOCRIT: 29.9 % (ref 37–47)
HEMOGLOBIN: 9.3 G/DL (ref 12–16)
IMMATURE GRANS #M: 0.1 10^3/UL (ref 0–0.03)
IMMATURE GRANS % (M): 1.8 % (ref 0–0.43)
LYMPHOCYTES #: 0.9 10^3/UL (ref 0.8–2.9)
LYMPHOCYTES %: 15.6 % (ref 15–51)
MEAN CORPUSCULAR HEMOGLOBIN: 25.5 PG (ref 29–33)
MEAN CORPUSCULAR HGB CONC: 31.1 G/DL (ref 32–37)
MEAN CORPUSCULAR VOLUME: 81.9 FL (ref 82–101)
MEAN PLATELET VOLUME: 11 FL (ref 7.4–10.4)
MONOCYTE #: 0.6 10^3/UL (ref 0.3–0.9)
MONOCYTES %: 10.4 % (ref 0–11)
NEUTROPHIL #: 3.7 10^3/UL (ref 1.6–7.5)
NEUTROPHILS %: 65.9 % (ref 39–77)
NUCLEATED RED BLOOD CELLS #: 0 10^3/UL (ref 0–0)
NUCLEATED RED BLOOD CELLS%: 0 /100WBC (ref 0–0)
PLATELET COUNT: 168 10^3/UL (ref 140–415)
POTASSIUM: 3.9 MMOL/L (ref 3.5–5.1)
RED BLOOD COUNT: 3.65 10^6/UL (ref 4.2–5.4)
RED CELL DISTRIBUTION WIDTH: 14.7 % (ref 11.5–14.5)
SODIUM: 142 MMOL/L (ref 135–144)
WHITE BLOOD COUNT: 5.6 10^3/UL (ref 4.8–10.8)

## 2019-03-10 RX ADMIN — ASCORBIC ACID, VITAMIN A PALMITATE, CHOLECALCIFEROL, THIAMINE HYDROCHLORIDE, RIBOFLAVIN-5 PHOSPHATE SODIUM, PYRIDOXINE HYDROCHLORIDE, NIACINAMIDE, DEXPANTHENOL, ALPHA-TOCOPHEROL ACETATE, VITAMIN K1, FOLIC ACID, BIOTIN, CYANOCOBALAMIN 1 MLS/HR: 200; 3300; 200; 6; 3.6; 6; 40; 15; 10; 150; 600; 60; 5 INJECTION, SOLUTION INTRAVENOUS at 17:09

## 2019-03-10 RX ADMIN — FAMOTIDINE 1 MG: 20 TABLET ORAL at 09:06

## 2019-03-10 RX ADMIN — FAMOTIDINE SCH MG: 20 TABLET ORAL at 20:36

## 2019-03-10 RX ADMIN — FAMOTIDINE SCH MG: 20 TABLET ORAL at 09:06

## 2019-03-10 RX ADMIN — INSULIN ASPART 1 UNIT: 100 INJECTION, SOLUTION INTRAVENOUS; SUBCUTANEOUS at 09:09

## 2019-03-10 RX ADMIN — INSULIN ASPART 1 UNIT: 100 INJECTION, SOLUTION INTRAVENOUS; SUBCUTANEOUS at 20:38

## 2019-03-10 RX ADMIN — MORPHINE SULFATE PRN MG: 10 SOLUTION ORAL at 13:25

## 2019-03-10 RX ADMIN — INSULIN ASPART 1 UNIT: 100 INJECTION, SOLUTION INTRAVENOUS; SUBCUTANEOUS at 17:15

## 2019-03-10 RX ADMIN — ASCORBIC ACID, VITAMIN A PALMITATE, CHOLECALCIFEROL, THIAMINE HYDROCHLORIDE, RIBOFLAVIN-5 PHOSPHATE SODIUM, PYRIDOXINE HYDROCHLORIDE, NIACINAMIDE, DEXPANTHENOL, ALPHA-TOCOPHEROL ACETATE, VITAMIN K1, FOLIC ACID, BIOTIN, CYANOCOBALAMIN SCH MLS/HR: 200; 3300; 200; 6; 3.6; 6; 40; 15; 10; 150; 600; 60; 5 INJECTION, SOLUTION INTRAVENOUS at 17:09

## 2019-03-10 RX ADMIN — POLYETHYLENE GLYCOL 3350 1 GM: 17 POWDER, FOR SOLUTION ORAL at 09:00

## 2019-03-10 RX ADMIN — ASCORBIC ACID, VITAMIN A PALMITATE, CHOLECALCIFEROL, THIAMINE HYDROCHLORIDE, RIBOFLAVIN-5 PHOSPHATE SODIUM, PYRIDOXINE HYDROCHLORIDE, NIACINAMIDE, DEXPANTHENOL, ALPHA-TOCOPHEROL ACETATE, VITAMIN K1, FOLIC ACID, BIOTIN, CYANOCOBALAMIN 1 MLS/HR: 200; 3300; 200; 6; 3.6; 6; 40; 15; 10; 150; 600; 60; 5 INJECTION, SOLUTION INTRAVENOUS at 05:00

## 2019-03-10 RX ADMIN — POLYETHYLENE GLYCOL 3350 SCH GM: 17 POWDER, FOR SOLUTION ORAL at 09:00

## 2019-03-10 RX ADMIN — FAMOTIDINE 1 MG: 20 TABLET ORAL at 20:36

## 2019-03-10 RX ADMIN — POLYETHYLENE GLYCOL 3350 SCH GM: 17 POWDER, FOR SOLUTION ORAL at 20:37

## 2019-03-10 RX ADMIN — HYDROCODONE BITARTRATE AND ACETAMINOPHEN 1 TAB: 10; 325 TABLET ORAL at 23:44

## 2019-03-10 RX ADMIN — HYDROCODONE BITARTRATE AND ACETAMINOPHEN 1 TAB: 10; 325 TABLET ORAL at 16:27

## 2019-03-10 RX ADMIN — MORPHINE SULFATE 1 MG: 10 SOLUTION ORAL at 13:25

## 2019-03-10 RX ADMIN — INSULIN ASPART 1 UNIT: 100 INJECTION, SOLUTION INTRAVENOUS; SUBCUTANEOUS at 11:30

## 2019-03-10 RX ADMIN — POLYETHYLENE GLYCOL 3350 1 GM: 17 POWDER, FOR SOLUTION ORAL at 20:37

## 2019-03-10 RX ADMIN — ASCORBIC ACID, VITAMIN A PALMITATE, CHOLECALCIFEROL, THIAMINE HYDROCHLORIDE, RIBOFLAVIN-5 PHOSPHATE SODIUM, PYRIDOXINE HYDROCHLORIDE, NIACINAMIDE, DEXPANTHENOL, ALPHA-TOCOPHEROL ACETATE, VITAMIN K1, FOLIC ACID, BIOTIN, CYANOCOBALAMIN SCH MLS/HR: 200; 3300; 200; 6; 3.6; 6; 40; 15; 10; 150; 600; 60; 5 INJECTION, SOLUTION INTRAVENOUS at 05:00

## 2019-03-10 RX ADMIN — HYDROCODONE BITARTRATE AND ACETAMINOPHEN 1 TAB: 10; 325 TABLET ORAL at 10:17

## 2019-03-10 NOTE — PN
Date/Time of Note


Date/Time of Note


DATE: 3/10/19 


TIME: 11:13





Assessment/Plan


VTE Prophylaxis


Risk score (from Ns)>0 risk:  3


SCD applied (from Ns):  Yes


Pharmacological prophylaxis:  LMWH





Lines/Catheters


IV Catheter Type (from [facility]):  Peripheral IV


Urinary Cath still in place:  No





Assessment/Plan


Assessment/Plan


1. POD #5 resection pelvic sidewall masss


2. resolving ileus


3. acute kidney injury resolved


4. plan d.c home uin am after drain removed


Result Diagram:  


3/10/19 0552                                                                    


           3/10/19 0552





Results 24hrs





Laboratory Tests


Test
                    3/9/19
12:13  3/9/19
17:05  3/9/19
22:29  3/10/19
05:52


Bedside Glucose                 105           110           118


White Blood Count                                                         5.6  #


Red Blood Count                                                          3.65  L


Hemoglobin                                                                9.3  L


Hematocrit                                                               29.9  L


Mean Corpuscular                                                         81.9  L


Volume


Mean Corpuscular                                                         25.5  L


Hemoglobin


Mean Corpuscular        
              
             
                  31.1  L



Hemoglobin
Concent


Red Cell Distribution                                                    14.7  H


Width


Platelet Count                                                            168  #


Mean Platelet Volume                                                     11.0  H


Immature Granulocytes                                                   1.800  H


%


Neutrophils %                                                             65.9


Lymphocytes %                                                             15.6


Monocytes %                                                               10.4


Eosinophils %                                                              5.2


Basophils %                                                                1.1


Nucleated Red Blood                                                        0.0


Cells %


Immature Granulocytes                                                   0.100  H


#


Neutrophils #                                                              3.7


Lymphocytes #                                                              0.9


Monocytes #                                                                0.6


Eosinophils #                                                              0.3


Basophils #                                                                0.1


Nucleated Red Blood                                                        0.0


Cells #


Sodium Level                                                               142


Potassium Level                                                            3.9


Chloride Level                                                             107


Carbon Dioxide Level                                                        27


Anion Gap                                                                    8


Blood Urea Nitrogen                                                          7


Creatinine                                                                0.79


Est Glomerular Filtrat  
              
             
             > 60  



Rate
mL/min


Glucose Level                                                              113


Calcium Level                                                             8.0  L


Test
                   3/10/19
09:02  
             
             



Bedside Glucose                 106








Subjective


24 Hr Interval Summary


Free Text/Dictation


no compains, still min PO intake, passing flatus and having BM





Exam/Review of Systems


Exam


Vitals





Vital Signs


  Date      Temp  Pulse  Resp  B/P (MAP)   Pulse Ox  O2          O2 Flow    FiO2


Time                                                 Delivery    Rate


   3/10/19  98.3     68    18      153/75        96


     08:00                          (101)


    3/9/19                                           Room Air


     15:13


    3/8/19                                                             2.0


     20:00








Intake and Output





3/9/19


3/9/19


3/10/19





1414:59


22:59


06:59





IntakeIntake Total


240 ml


1150 ml


450 ml





OutputOutput Total


10 ml





BalanceBalance


240 ml


1140 ml


450 ml











Exam


nad, ctab, rrr





Results


Results 24hrs





Laboratory Tests


Test
                    3/9/19
12:13  3/9/19
17:05  3/9/19
22:29  3/10/19
05:52


Bedside Glucose                 105           110           118


White Blood Count                                                         5.6  #


Red Blood Count                                                          3.65  L


Hemoglobin                                                                9.3  L


Hematocrit                                                               29.9  L


Mean Corpuscular                                                         81.9  L


Volume


Mean Corpuscular                                                         25.5  L


Hemoglobin


Mean Corpuscular        
              
             
                  31.1  L



Hemoglobin
Concent


Red Cell Distribution                                                    14.7  H


Width


Platelet Count                                                            168  #


Mean Platelet Volume                                                     11.0  H


Immature Granulocytes                                                   1.800  H


%


Neutrophils %                                                             65.9


Lymphocytes %                                                             15.6


Monocytes %                                                               10.4


Eosinophils %                                                              5.2


Basophils %                                                                1.1


Nucleated Red Blood                                                        0.0


Cells %


Immature Granulocytes                                                   0.100  H


#


Neutrophils #                                                              3.7


Lymphocytes #                                                              0.9


Monocytes #                                                                0.6


Eosinophils #                                                              0.3


Basophils #                                                                0.1


Nucleated Red Blood                                                        0.0


Cells #


Sodium Level                                                               142


Potassium Level                                                            3.9


Chloride Level                                                             107


Carbon Dioxide Level                                                        27


Anion Gap                                                                    8


Blood Urea Nitrogen                                                          7


Creatinine                                                                0.79


Est Glomerular Filtrat  
              
             
             > 60  



Rate
mL/min


Glucose Level                                                              113


Calcium Level                                                             8.0  L


Test
                   3/10/19
09:02  
             
             



Bedside Glucose                 106








Medications


Medication





Current Medications


IV Flush (NS 3 ml) 3 ml PER PROTOCOL IV ;  Start 2/26/19 at 11:00


Acetaminophen (Tylenol Tab) 650 mg Q6H  PRN PO .PAIN 1-3 OR TEMP Last 


administered on 3/9/19at 16:09; Admin Dose 650 MG;  Start 2/26/19 at 11:00


Docusate Sodium (Colace) 100 mg Q12H  PRN PO .CONSTIPATION Last administered on 


3/7/19at 12:44; Admin Dose 100 MG;  Start 2/26/19 at 11:00


Zolpidem Tartrate (Ambien) 5 mg QHS  PRN PO .INSOMNIA Last administered on 


3/9/19at 23:12; Admin Dose 5 MG;  Start 2/26/19 at 11:00


Carvedilol (Coreg) 3.125 mg BID PO  Last administered on 3/10/19at 09:06; Admin 


Dose 3.125 MG;  Start 2/26/19 at 11:30


Diagnostic Test (Pha) (Accu-Chek) 1 ea 02 XX ;  Start 2/27/19 at 02:00


Miscellaneous Information 1 ea NOTE XX ;  Start 2/26/19 at 12:30


Glucose (Glutose) 15 gm Q15M  PRN PO DECREASED GLUCOSE;  Start 2/26/19 at 12:30


Glucose (Glutose) 22.5 gm Q15M  PRN PO DECREASED GLUCOSE;  Start 2/26/19 at 


12:30


Dextrose (D50w Syringe) 25 ml Q15M  PRN IV DECREASED GLUCOSE;  Start 2/26/19 at 


12:30


Dextrose (D50w Syringe) 50 ml Q15M  PRN IV DECREASED GLUCOSE;  Start 2/26/19 at 


12:30


Glucagon (Glucagen) 1 mg Q15M  PRN IM DECREASED GLUCOSE;  Start 2/26/19 at 12:30


Glucose (Glutose) 15 gm Q15M  PRN BUCCAL DECREASED GLUCOSE;  Start 2/26/19 at 


12:30


Polyethylene Glycol (Miralax) 17 gm BID PO  Last administered on 3/9/19at 19:46;


Admin Dose 17 GM;  Start 2/27/19 at 09:00


Clonidine (Catapres) 0.1 mg Q4H  PRN PO ELEVATED SYSTOLIC BP Last administered 


on 3/9/19at 19:45; Admin Dose 0.1 MG;  Start 2/28/19 at 15:30


Morphine Sulfate (morphine) 6 mg Q4H  PRN PO SEVERE PAIN LEVEL 7-10 Last 


administered on 3/6/19at 16:29; Admin Dose 6 MG;  Start 3/4/19 at 16:00


Morphine Sulfate (morphine) 2 mg Q2H  PRN IV PAIN LEVEL 6-10 Last administered 


on 3/9/19at 05:22; Admin Dose 2 MG;  Start 3/4/19 at 21:00


Acetaminophen/ Hydrocodone Bitart (Norco (5/325)) 1 tab Q6H  PRN PO PAIN LEVEL 


6-10;  Start 3/4/19 at 21:00


Acetaminophen/ Hydrocodone Bitart (Norco (10/325)) 1 tab Q6H  PRN PO PAIN LEVEL 


6-10 Last administered on 3/10/19at 10:17; Admin Dose 1 TAB;  Start 3/4/19 at 


21:00


Diphenhydramine HCl (Benadryl) 25 mg Q6H  PRN IV ITCHING;  Start 3/4/19 at 21:00


Ondansetron HCl (Zofran Inj) 4 mg Q6H  PRN IV NAUSEA AND/OR VOMITING Last 


administered on 3/7/19at 12:51; Admin Dose 4 MG;  Start 3/4/19 at 21:00


Famotidine (Pepcid) 20 mg Q12 PO  Last administered on 3/10/19at 09:06; Admin 


Dose 20 MG;  Start 3/6/19 at 21:00


Insulin Aspart (Novolog Insulin Pen) (Adult SC Insulin - Mild Algorithm)... AC 


MEALS AND  BEDTIME SC ;  Start 3/7/19 at 12:30


Potassium Chloride/Dextrose/ Sod Cl 1,000 ml @  75 mls/hr [id number] IV  Last 


administered on 3/9/19at 18:21; Admin Dose 75 MLS/HR;  Start 3/8/19 at 13:00











SCOTT GOLDSMITH MD         Mar 10, 2019 11:15

## 2019-03-10 NOTE — CONS
Assessment/Plan


Assessment/Plan


Assessment/Plan (Daily)


1. Acute kidney injury due to possible prerenal azotemia + possibly obstruction 


from mass


2.  H/o Uterine CA s/p hysterectomy, now concerned about recurrence of tumour 


3. pelvis mass, pssible recurrence 


4. Bilateral Hydronephrosis Cystoscopy and insertion of bilateral ureteral JJ 


stents on 3/1/19 by Dr. Han  


5. H/O HTN


6. H/o DM II





Plan:


Cystoscopy and insertion of bilateral ureteral JJ stents on 3/1/19 by Dr. Han, BUN/Cr improved to 5/0.74- Other electrolytes stable 


UO - X 5 voids


BUN/Cr- 5/0,74;


s/p GYN oncology consult -s/p Exp Lap, Resection of pelvic tumour and Small 


bowel on 3/4/18- doing well


IVF D5NS with KCL at 75 cc/hr


will follow up


Patient seen in collaboration with Dr ARAMIS Gomes. Dw staff








-nad-


On Mechanical soft diet- tolerating diet


- UO- 1.6 L/24 hrs


no new issues reported last night





Consultation Date/Type/Reason


Admit Date/Time


Feb 26, 2019 at 10:49


Initial Consult Date


2/28/19


Requesting Provider:  JOY RAMIREZ MD


Date/Time of Note


DATE: 3/10/19 


TIME: 13:48





24 HR Interval Summary


Free Text/Dictation


-nad-


- tolerating diet


- UO- x 5 


no new issues reported last night


Constitutional:  improved, requiring IVF





Detailed Summary


Eyes:  no complaints


ENT:  no complaints


Respiratory:  no complaints


Cardiovascular:  no complaints


Gastrointestinal:  no complaints


Genitourinary:  no complaints


Musculoskeletal:  no complaints


Skin:  no complaints


Neurologic:  no complaints


Endocrine:  no complaints


Lymphatic:  no complaints





Exam/Review of Systems


Exam


Vitals





Vital Signs


  Date      Temp  Pulse  Resp  B/P (MAP)   Pulse Ox  O2          O2 Flow    FiO2


Time                                                 Delivery    Rate


   3/10/19  98.3     68    18      153/75        96


     08:00                          (101)


    3/9/19                                           Room Air


     15:13


    3/8/19                                                             2.0


     20:00








Intake and Output





3/9/19


3/9/19


3/10/19





1515:00


23:00


07:00





IntakeIntake Total


240 ml


1150 ml


450 ml





OutputOutput Total


10 ml





BalanceBalance


240 ml


1140 ml


450 ml











Constitutional:  alert, well developed


Psych:  nl mood/affect


Head:  atraumatic


Eyes:  EOMI, nl lids, nl sclera


ENMT:  nl external ears & nose


Neck:  non-tender


Respiratory:  diminished breath sounds (at bases bilaterally)


Cardiovascular:  nl pulses, other (s1s2)


Gastrointestinal:  soft, other (surgical abdomen)


Musculoskeletal:  nl extremities to inspection


Extremities:  normal pulses


Neurological:  nl speech, other (alert/responsive)


Lymph:  nontender





Results


Result Diagram:  


3/10/19 0552                                                                    


           3/10/19 0552





Results 24hrs





Laboratory Tests


Test
                   3/9/19
17:05  3/9/19
22:29  3/10/19
05:52  3/10/19
09:02


Bedside Glucose                110           118                           106


White Blood Count                                          5.6  #


Red Blood Count                                           3.65  L


Hemoglobin                                                 9.3  L


Hematocrit                                                29.9  L


Mean Corpuscular                                          81.9  L


Volume


Mean Corpuscular                                          25.5  L


Hemoglobin


Mean Corpuscular       
              
                  31.1  L
  



Hemoglobin
Concent


Red Cell Distribution                                     14.7  H


Width


Platelet Count                                             168  #


Mean Platelet Volume                                      11.0  H


Immature Granulocytes                                    1.800  H


%


Neutrophils %                                              65.9


Lymphocytes %                                              15.6


Monocytes %                                                10.4


Eosinophils %                                               5.2


Basophils %                                                 1.1


Nucleated Red Blood                                         0.0


Cells %


Immature Granulocytes                                    0.100  H


#


Neutrophils #                                               3.7


Lymphocytes #                                               0.9


Monocytes #                                                 0.6


Eosinophils #                                               0.3


Basophils #                                                 0.1


Nucleated Red Blood                                         0.0


Cells #


Sodium Level                                                142


Potassium Level                                             3.9


Chloride Level                                              107


Carbon Dioxide Level                                         27


Anion Gap                                                     8


Blood Urea Nitrogen                                           7


Creatinine                                                 0.79


Est Glomerular         
              
             > 60  
        



Filtrat Rate
mL/min


Glucose Level                                               113


Calcium Level                                              8.0  L


Test
                  3/10/19
12:13  
             
              



Bedside Glucose                 88








Medications


Medication





Current Medications


IV Flush (NS 3 ml) 3 ml PER PROTOCOL IV ;  Start 2/26/19 at 11:00


Acetaminophen (Tylenol Tab) 650 mg Q6H  PRN PO .PAIN 1-3 OR TEMP Last 


administered on 3/9/19at 16:09; Admin Dose 650 MG;  Start 2/26/19 at 11:00


Docusate Sodium (Colace) 100 mg Q12H  PRN PO .CONSTIPATION Last administered on 


3/7/19at 12:44; Admin Dose 100 MG;  Start 2/26/19 at 11:00


Zolpidem Tartrate (Ambien) 5 mg QHS  PRN PO .INSOMNIA Last administered on 


3/9/19at 23:12; Admin Dose 5 MG;  Start 2/26/19 at 11:00


Carvedilol (Coreg) 3.125 mg BID PO  Last administered on 3/10/19at 09:06; Admin 


Dose 3.125 MG;  Start 2/26/19 at 11:30


Diagnostic Test (Pha) (Accu-Chek) 1 ea 02 XX ;  Start 2/27/19 at 02:00


Miscellaneous Information 1 ea NOTE XX ;  Start 2/26/19 at 12:30


Glucose (Glutose) 15 gm Q15M  PRN PO DECREASED GLUCOSE;  Start 2/26/19 at 12:30


Glucose (Glutose) 22.5 gm Q15M  PRN PO DECREASED GLUCOSE;  Start 2/26/19 at 


12:30


Dextrose (D50w Syringe) 25 ml Q15M  PRN IV DECREASED GLUCOSE;  Start 2/26/19 at 


12:30


Dextrose (D50w Syringe) 50 ml Q15M  PRN IV DECREASED GLUCOSE;  Start 2/26/19 at 


12:30


Glucagon (Glucagen) 1 mg Q15M  PRN IM DECREASED GLUCOSE;  Start 2/26/19 at 12:30


Glucose (Glutose) 15 gm Q15M  PRN BUCCAL DECREASED GLUCOSE;  Start 2/26/19 at 


12:30


Polyethylene Glycol (Miralax) 17 gm BID PO  Last administered on 3/9/19at 19:46;


Admin Dose 17 GM;  Start 2/27/19 at 09:00


Clonidine (Catapres) 0.1 mg Q4H  PRN PO ELEVATED SYSTOLIC BP Last administered 


on 3/9/19at 19:45; Admin Dose 0.1 MG;  Start 2/28/19 at 15:30


Morphine Sulfate (morphine) 6 mg Q4H  PRN PO SEVERE PAIN LEVEL 7-10 Last 


administered on 3/10/19at 13:25; Admin Dose 6 MG;  Start 3/4/19 at 16:00


Morphine Sulfate (morphine) 2 mg Q2H  PRN IV PAIN LEVEL 6-10 Last administered 


on 3/9/19at 05:22; Admin Dose 2 MG;  Start 3/4/19 at 21:00


Acetaminophen/ Hydrocodone Bitart (Norco (5/325)) 1 tab Q6H  PRN PO PAIN LEVEL 


6-10;  Start 3/4/19 at 21:00


Acetaminophen/ Hydrocodone Bitart (Norco (10/325)) 1 tab Q6H  PRN PO PAIN LEVEL 


6-10 Last administered on 3/10/19at 10:17; Admin Dose 1 TAB;  Start 3/4/19 at 21


:00


Diphenhydramine HCl (Benadryl) 25 mg Q6H  PRN IV ITCHING;  Start 3/4/19 at 21:00


Ondansetron HCl (Zofran Inj) 4 mg Q6H  PRN IV NAUSEA AND/OR VOMITING Last 


administered on 3/7/19at 12:51; Admin Dose 4 MG;  Start 3/4/19 at 21:00


Famotidine (Pepcid) 20 mg Q12 PO  Last administered on 3/10/19at 09:06; Admin 


Dose 20 MG;  Start 3/6/19 at 21:00


Insulin Aspart (Novolog Insulin Pen) (Adult SC Insulin - Mild Algorithm)... AC 


MEALS AND  BEDTIME SC ;  Start 3/7/19 at 12:30


Potassium Chloride/Dextrose/ Sod Cl 1,000 ml @  75 mls/hr [id number] IV  Last 


administered on 3/9/19at 18:21; Admin Dose 75 MLS/HR;  Start 3/8/19 at 13:00











MEG GE                 Mar 10, 2019 13:49

## 2019-03-10 NOTE — PN
Date/Time of Note


Date/Time of Note


DATE: 3/10/19 


TIME: 12:36





Assessment/Plan


VTE Prophylaxis


Risk score (from Ns)>0 risk:  3


SCD applied (from Ns):  Yes


Pharmacological prophylaxis:  LMWH





Lines/Catheters


IV Catheter Type (from Nrsg):  Peripheral IV


Central line still needed:  No


Urinary Cath still in place:  No





Assessment/Plan


Hospital Course


POD 6, feel better


Assessment/Plan


TUAN out, Discahrge tomorrow, follow up in office.


Result Diagram:  


3/10/19 0552                                                                    


           3/10/19 0552





Results 24hrs





Laboratory Tests


Test
                   3/9/19
17:05  3/9/19
22:29  3/10/19
05:52  3/10/19
09:02


Bedside Glucose                110           118                           106


White Blood Count                                          5.6  #


Red Blood Count                                           3.65  L


Hemoglobin                                                 9.3  L


Hematocrit                                                29.9  L


Mean Corpuscular                                          81.9  L


Volume


Mean Corpuscular                                          25.5  L


Hemoglobin


Mean Corpuscular       
              
                  31.1  L
  



Hemoglobin
Concent


Red Cell Distribution                                     14.7  H


Width


Platelet Count                                             168  #


Mean Platelet Volume                                      11.0  H


Immature Granulocytes                                    1.800  H


%


Neutrophils %                                              65.9


Lymphocytes %                                              15.6


Monocytes %                                                10.4


Eosinophils %                                               5.2


Basophils %                                                 1.1


Nucleated Red Blood                                         0.0


Cells %


Immature Granulocytes                                    0.100  H


#


Neutrophils #                                               3.7


Lymphocytes #                                               0.9


Monocytes #                                                 0.6


Eosinophils #                                               0.3


Basophils #                                                 0.1


Nucleated Red Blood                                         0.0


Cells #


Sodium Level                                                142


Potassium Level                                             3.9


Chloride Level                                              107


Carbon Dioxide Level                                         27


Anion Gap                                                     8


Blood Urea Nitrogen                                           7


Creatinine                                                 0.79


Est Glomerular         
              
             > 60  
        



Filtrat Rate
mL/min


Glucose Level                                               113


Calcium Level                                              8.0  L


Test
                  3/10/19
12:13  
             
              



Bedside Glucose                 88








Subjective


24 Hr Interval Summary


Free Text/Dictation


Doing better, + BM





Exam/Review of Systems


Exam


Vitals





Vital Signs


  Date      Temp  Pulse  Resp  B/P (MAP)   Pulse Ox  O2          O2 Flow    FiO2


Time                                                 Delivery    Rate


   3/10/19  98.3     68    18      153/75        96


     08:00                          (101)


    3/9/19                                           Room Air


     15:13


    3/8/19                                                             2.0


     20:00








Intake and Output





3/9/19


3/9/19


3/10/19





1515:00


23:00


07:00





IntakeIntake Total


240 ml


1150 ml


450 ml





OutputOutput Total


10 ml





BalanceBalance


240 ml


1140 ml


450 ml











Gastrointestinal:  soft





Results


Results 24hrs





Laboratory Tests


Test
                   3/9/19
17:05  3/9/19
22:29  3/10/19
05:52  3/10/19
09:02


Bedside Glucose                110           118                           106


White Blood Count                                          5.6  #


Red Blood Count                                           3.65  L


Hemoglobin                                                 9.3  L


Hematocrit                                                29.9  L


Mean Corpuscular                                          81.9  L


Volume


Mean Corpuscular                                          25.5  L


Hemoglobin


Mean Corpuscular       
              
                  31.1  L
  



Hemoglobin
Concent


Red Cell Distribution                                     14.7  H


Width


Platelet Count                                             168  #


Mean Platelet Volume                                      11.0  H


Immature Granulocytes                                    1.800  H


%


Neutrophils %                                              65.9


Lymphocytes %                                              15.6


Monocytes %                                                10.4


Eosinophils %                                               5.2


Basophils %                                                 1.1


Nucleated Red Blood                                         0.0


Cells %


Immature Granulocytes                                    0.100  H


#


Neutrophils #                                               3.7


Lymphocytes #                                               0.9


Monocytes #                                                 0.6


Eosinophils #                                               0.3


Basophils #                                                 0.1


Nucleated Red Blood                                         0.0


Cells #


Sodium Level                                                142


Potassium Level                                             3.9


Chloride Level                                              107


Carbon Dioxide Level                                         27


Anion Gap                                                     8


Blood Urea Nitrogen                                           7


Creatinine                                                 0.79


Est Glomerular         
              
             > 60  
        



Filtrat Rate
mL/min


Glucose Level                                               113


Calcium Level                                              8.0  L


Test
                  3/10/19
12:13  
             
              



Bedside Glucose                 88








Medications


Medication





Current Medications


IV Flush (NS 3 ml) 3 ml PER PROTOCOL IV ;  Start 2/26/19 at 11:00


Acetaminophen (Tylenol Tab) 650 mg Q6H  PRN PO .PAIN 1-3 OR TEMP Last 


administered on 3/9/19at 16:09; Admin Dose 650 MG;  Start 2/26/19 at 11:00


Docusate Sodium (Colace) 100 mg Q12H  PRN PO .CONSTIPATION Last administered on 


3/7/19at 12:44; Admin Dose 100 MG;  Start 2/26/19 at 11:00


Zolpidem Tartrate (Ambien) 5 mg QHS  PRN PO .INSOMNIA Last administered on 


3/9/19at 23:12; Admin Dose 5 MG;  Start 2/26/19 at 11:00


Carvedilol (Coreg) 3.125 mg BID PO  Last administered on 3/10/19at 09:06; Admin 


Dose 3.125 MG;  Start 2/26/19 at 11:30


Diagnostic Test (Pha) (Accu-Chek) 1 ea 02 XX ;  Start 2/27/19 at 02:00


Miscellaneous Information 1 ea NOTE XX ;  Start 2/26/19 at 12:30


Glucose (Glutose) 15 gm Q15M  PRN PO DECREASED GLUCOSE;  Start 2/26/19 at 12:30


Glucose (Glutose) 22.5 gm Q15M  PRN PO DECREASED GLUCOSE;  Start 2/26/19 at 


12:30


Dextrose (D50w Syringe) 25 ml Q15M  PRN IV DECREASED GLUCOSE;  Start 2/26/19 at 


12:30


Dextrose (D50w Syringe) 50 ml Q15M  PRN IV DECREASED GLUCOSE;  Start 2/26/19 at 


12:30


Glucagon (Glucagen) 1 mg Q15M  PRN IM DECREASED GLUCOSE;  Start 2/26/19 at 12:30


Glucose (Glutose) 15 gm Q15M  PRN BUCCAL DECREASED GLUCOSE;  Start 2/26/19 at 


12:30


Polyethylene Glycol (Miralax) 17 gm BID PO  Last administered on 3/9/19at 19:46;


Admin Dose 17 GM;  Start 2/27/19 at 09:00


Clonidine (Catapres) 0.1 mg Q4H  PRN PO ELEVATED SYSTOLIC BP Last administered 


on 3/9/19at 19:45; Admin Dose 0.1 MG;  Start 2/28/19 at 15:30


Morphine Sulfate (morphine) 6 mg Q4H  PRN PO SEVERE PAIN LEVEL 7-10 Last 


administered on 3/6/19at 16:29; Admin Dose 6 MG;  Start 3/4/19 at 16:00


Morphine Sulfate (morphine) 2 mg Q2H  PRN IV PAIN LEVEL 6-10 Last administered 


on 3/9/19at 05:22; Admin Dose 2 MG;  Start 3/4/19 at 21:00


Acetaminophen/ Hydrocodone Bitart (Norco (5/325)) 1 tab Q6H  PRN PO PAIN LEVEL 


6-10;  Start 3/4/19 at 21:00


Acetaminophen/ Hydrocodone Bitart (Norco (10/325)) 1 tab Q6H  PRN PO PAIN LEVEL 


6-10 Last administered on 3/10/19at 10:17; Admin Dose 1 TAB;  Start 3/4/19 at 


21:00


Diphenhydramine HCl (Benadryl) 25 mg Q6H  PRN IV ITCHING;  Start 3/4/19 at 21:00


Ondansetron HCl (Zofran Inj) 4 mg Q6H  PRN IV NAUSEA AND/OR VOMITING Last 


administered on 3/7/19at 12:51; Admin Dose 4 MG;  Start 3/4/19 at 21:00


Famotidine (Pepcid) 20 mg Q12 PO  Last administered on 3/10/19at 09:06; Admin 


Dose 20 MG;  Start 3/6/19 at 21:00


Insulin Aspart (Novolog Insulin Pen) (Adult SC Insulin - Mild Algorithm)... AC 


MEALS AND  BEDTIME SC ;  Start 3/7/19 at 12:30


Potassium Chloride/Dextrose/ Sod Cl 1,000 ml @  75 mls/hr [id number] IV  Last 


administered on 3/9/19at 18:21; Admin Dose 75 MLS/HR;  Start 3/8/19 at 13:00











KRISH GUAJARDO MD               Mar 10, 2019 12:39

## 2019-03-11 VITALS — RESPIRATION RATE: 22 BRPM | HEART RATE: 69 BPM | DIASTOLIC BLOOD PRESSURE: 64 MMHG | SYSTOLIC BLOOD PRESSURE: 139 MMHG

## 2019-03-11 VITALS — DIASTOLIC BLOOD PRESSURE: 62 MMHG | RESPIRATION RATE: 20 BRPM | SYSTOLIC BLOOD PRESSURE: 142 MMHG | HEART RATE: 67 BPM

## 2019-03-11 RX ADMIN — ZOLPIDEM TARTRATE 1 MG: 5 TABLET, FILM COATED ORAL at 01:37

## 2019-03-11 RX ADMIN — POLYETHYLENE GLYCOL 3350 1 GM: 17 POWDER, FOR SOLUTION ORAL at 08:52

## 2019-03-11 RX ADMIN — MORPHINE SULFATE 1 MG: 10 SOLUTION ORAL at 00:46

## 2019-03-11 RX ADMIN — ASCORBIC ACID, VITAMIN A PALMITATE, CHOLECALCIFEROL, THIAMINE HYDROCHLORIDE, RIBOFLAVIN-5 PHOSPHATE SODIUM, PYRIDOXINE HYDROCHLORIDE, NIACINAMIDE, DEXPANTHENOL, ALPHA-TOCOPHEROL ACETATE, VITAMIN K1, FOLIC ACID, BIOTIN, CYANOCOBALAMIN SCH MLS/HR: 200; 3300; 200; 6; 3.6; 6; 40; 15; 10; 150; 600; 60; 5 INJECTION, SOLUTION INTRAVENOUS at 05:37

## 2019-03-11 RX ADMIN — FAMOTIDINE 1 MG: 20 TABLET ORAL at 08:53

## 2019-03-11 RX ADMIN — ZOLPIDEM TARTRATE PRN MG: 5 TABLET, FILM COATED ORAL at 01:37

## 2019-03-11 RX ADMIN — HYDROCODONE BITARTRATE AND ACETAMINOPHEN 1 TAB: 10; 325 TABLET ORAL at 08:51

## 2019-03-11 RX ADMIN — POLYETHYLENE GLYCOL 3350 SCH GM: 17 POWDER, FOR SOLUTION ORAL at 08:52

## 2019-03-11 RX ADMIN — INSULIN ASPART 1 UNIT: 100 INJECTION, SOLUTION INTRAVENOUS; SUBCUTANEOUS at 07:00

## 2019-03-11 RX ADMIN — ASCORBIC ACID, VITAMIN A PALMITATE, CHOLECALCIFEROL, THIAMINE HYDROCHLORIDE, RIBOFLAVIN-5 PHOSPHATE SODIUM, PYRIDOXINE HYDROCHLORIDE, NIACINAMIDE, DEXPANTHENOL, ALPHA-TOCOPHEROL ACETATE, VITAMIN K1, FOLIC ACID, BIOTIN, CYANOCOBALAMIN 1 MLS/HR: 200; 3300; 200; 6; 3.6; 6; 40; 15; 10; 150; 600; 60; 5 INJECTION, SOLUTION INTRAVENOUS at 05:37

## 2019-03-11 RX ADMIN — MORPHINE SULFATE PRN MG: 10 SOLUTION ORAL at 00:46

## 2019-03-11 RX ADMIN — FAMOTIDINE SCH MG: 20 TABLET ORAL at 08:53

## 2019-03-11 NOTE — CONS
Assessment/Plan


Assessment/Plan


Assessment/Plan (Daily)


1. Acute kidney injury due to possible prerenal azotemia + possibely obstruction


from mass


2.  H/o Uterine CA s/p hysterectomy, now concerned about recurrence of tumour 


3. pelvis mass, pssible recurrence 


4. Bilateral Hydronephrosis Cystoscopy and insertion of bilateral ureteral JJ 


stents on 3/1/19 by Dr. Han  


5. H/O HTN


6. H/o DM II





Plan:


Cystoscopy and insertion of bilateral ureteral JJ stents on 3/1/19 by Dr. Han, BUN/Cr improved to 5/0.74- Other electrolytes stable 


s/p GYN oncology consult -s/p Exp Lap, Resection of pelvic tumour and Small 


bowel on 3/4/18- doing well


ok to d/c home with outpatient nephrology folllow up 


will follow up





Consultation Date/Type/Reason


Admit Date/Time


Feb 26, 2019 at 10:49


Initial Consult Date


2/26/19


Type of Consult


NEPHROLOGY


Requesting Provider:  JOY RAMIREZ MD


Date/Time of Note


DATE: 3/11/19 


TIME: 10:43





Exam/Review of Systems


Exam


Vitals





Vital Signs


  Date      Temp  Pulse  Resp  B/P (MAP)   Pulse Ox  O2          O2 Flow    FiO2


Time                                                 Delivery    Rate


   3/11/19  98.1     67    20      142/62        98  Room Air


     08:23                           (88)


    3/8/19                                                             2.0


     20:00








Intake and Output





3/10/19


3/10/19


3/11/19





1414:59


22:59


06:59





IntakeIntake Total


120 ml


1025 ml


875 ml





OutputOutput Total


5 ml





BalanceBalance


120 ml


1020 ml


875 ml














Results


Result Diagram:  


3/10/19 0552                                                                    


           3/10/19 0552





Results 24hrs





Laboratory Tests


  Test
            3/10/19
12:13  3/10/19
17:06  3/10/19
20:34  3/11/19
08:49


  Bedside Glucose           88             92            129            123








Medications


Medication





Current Medications


IV Flush (NS 3 ml) 3 ml PER PROTOCOL IV ;  Start 2/26/19 at 11:00


Acetaminophen (Tylenol Tab) 650 mg Q6H  PRN PO .PAIN 1-3 OR TEMP Last 


administered on 3/9/19at 16:09; Admin Dose 650 MG;  Start 2/26/19 at 11:00


Docusate Sodium (Colace) 100 mg Q12H  PRN PO .CONSTIPATION Last administered on 


3/7/19at 12:44; Admin Dose 100 MG;  Start 2/26/19 at 11:00


Zolpidem Tartrate (Ambien) 5 mg QHS  PRN PO .INSOMNIA Last administered on 


3/11/19at 01:37; Admin Dose 5 MG;  Start 2/26/19 at 11:00


Carvedilol (Coreg) 3.125 mg BID PO  Last administered on 3/11/19at 08:52; Admin 


Dose 3.125 MG;  Start 2/26/19 at 11:30


Diagnostic Test (Pha) (Accu-Chek) 1 ea 02 XX ;  Start 2/27/19 at 02:00


Miscellaneous Information 1 ea NOTE XX ;  Start 2/26/19 at 12:30


Glucose (Glutose) 15 gm Q15M  PRN PO DECREASED GLUCOSE;  Start 2/26/19 at 12:30


Glucose (Glutose) 22.5 gm Q15M  PRN PO DECREASED GLUCOSE;  Start 2/26/19 at 


12:30


Dextrose (D50w Syringe) 25 ml Q15M  PRN IV DECREASED GLUCOSE;  Start 2/26/19 at 


12:30


Dextrose (D50w Syringe) 50 ml Q15M  PRN IV DECREASED GLUCOSE;  Start 2/26/19 at 


12:30


Glucagon (Glucagen) 1 mg Q15M  PRN IM DECREASED GLUCOSE;  Start 2/26/19 at 12:30


Glucose (Glutose) 15 gm Q15M  PRN BUCCAL DECREASED GLUCOSE;  Start 2/26/19 at 


12:30


Polyethylene Glycol (Miralax) 17 gm BID PO  Last administered on 3/11/19at 


08:52; Admin Dose 17 GM;  Start 2/27/19 at 09:00


Clonidine (Catapres) 0.1 mg Q4H  PRN PO ELEVATED SYSTOLIC BP Last administered 


on 3/10/19at 17:07; Admin Dose 0.1 MG;  Start 2/28/19 at 15:30


Morphine Sulfate (morphine) 6 mg Q4H  PRN PO SEVERE PAIN LEVEL 7-10 Last adminis


tered on 3/11/19at 00:46; Admin Dose 6 MG;  Start 3/4/19 at 16:00


Morphine Sulfate (morphine) 2 mg Q2H  PRN IV PAIN LEVEL 6-10 Last administered 


on 3/9/19at 05:22; Admin Dose 2 MG;  Start 3/4/19 at 21:00


Acetaminophen/ Hydrocodone Bitart (Norco (5/325)) 1 tab Q6H  PRN PO PAIN LEVEL 


6-10;  Start 3/4/19 at 21:00


Acetaminophen/ Hydrocodone Bitart (Norco (10/325)) 1 tab Q6H  PRN PO PAIN LEVEL 


6-10 Last administered on 3/11/19at 08:51; Admin Dose 1 TAB;  Start 3/4/19 at 


21:00


Diphenhydramine HCl (Benadryl) 25 mg Q6H  PRN IV ITCHING;  Start 3/4/19 at 21:00


Ondansetron HCl (Zofran Inj) 4 mg Q6H  PRN IV NAUSEA AND/OR VOMITING Last 


administered on 3/7/19at 12:51; Admin Dose 4 MG;  Start 3/4/19 at 21:00


Famotidine (Pepcid) 20 mg Q12 PO  Last administered on 3/11/19at 08:53; Admin 


Dose 20 MG;  Start 3/6/19 at 21:00


Insulin Aspart (Novolog Insulin Pen) (Adult SC Insulin - Mild Algorithm)... AC 


MEALS AND  BEDTIME SC ;  Start 3/7/19 at 12:30


Potassium Chloride/Dextrose/ Sod Cl 1,000 ml @  75 mls/hr [id number] IV  Last 


administered on 3/11/19at 05:37; Admin Dose 75 MLS/HR;  Start 3/8/19 at 13:00


Hydralazine HCl (Apresoline) 25 mg Q6H  PRN PO SBP >160 Last administered on 


3/10/19at 18:17; Admin Dose 25 MG;  Start 3/10/19 at 18:30











MARGIE GODINEZ MD           Mar 11, 2019 10:43

## 2019-03-11 NOTE — PDOCDIS
Discharge Instructions


CONDITION


                 [id number]
Patient Condition:  [id number]
Good








HOME CARE INSTRUCTIONS:


               [id number]
Diet Instructions:  [id number]
       [id number]
Activity Restrictions:  [id number]
No Restrictions


                                             Slowly Increase Activity








FOLLOW UP/APPOINTMENTS


Follow-up Plan


pcp 1 week





Dr Brown 1 week











JOY RAMIREZ MD                  Mar 11, 2019 09:28

## 2019-03-11 NOTE — DS
DATE OF ADMISSION: 02/26/2019

DATE OF DISCHARGE: 03/11/2019

 

DISCHARGE DIAGNOSES:

1.  A 58-year-old female with recurrent ovarian cancer.

2.  Status post pelvic mass section, enterolysis and partial small bowel resection.

3.  History of synchronous uterine and ovarian cancer in 2011, status post total abdominal hysterecto
my.

4.  Hypertension.

5.  Type 2 diabetes mellitus.

 

HOSPITAL COURSE:  A 58-year-old very pleasant female presented to emergency room with complaint of pe
rsistent lower abdominal pain.  Initially, the patient was found to be in acute kidney injury.  She h
ad a known large pelvic mass.  The patient was evaluated by Dr. Brown.

 

She was seen by the nephrology and urology.  The patient underwent bilateral ureteral stent placement
 since the mass was pressing against the distal aspect of the ureters.  Kidney function improved foll
owing stent placement.  The patient was then taken to the operating room and underwent pelvic mass re
section, enterolysis and small bowel resection.  This was found to be recurrent ovarian cancer.  The 
patient is now in a stable condition for discharge.  She is tolerating oral intake and has had bowel 
movements during the hospitalization.

 

FOLLOWUP:  She will follow up with PCP and Dr. Brown.

 

MEDICATIONS ON DISCHARGE:

1.  Benazepril 40 mg daily.

2.  Coreg 3.125 mg b.i.d.

3.  Jardiance 10 mg daily.

4.  Omeprazole 40 daily.

5.  Tramadol every 6 hours as needed.

 

 

Dictated By: JOY PARISI/CHONG

DD:    03/11/2019 09:32:03

DT:    03/11/2019 17:37:19

Conf#: [id number]

DID#:  [id number]

## 2019-04-08 ENCOUNTER — HOSPITAL ENCOUNTER (INPATIENT)
Dept: HOSPITAL 91 - E/R | Age: 59
LOS: 1 days | Discharge: HOME | DRG: 390 | End: 2019-04-09
Payer: COMMERCIAL

## 2019-04-08 ENCOUNTER — HOSPITAL ENCOUNTER (INPATIENT)
Dept: HOSPITAL 10 - E/R | Age: 59
LOS: 1 days | Discharge: HOME | DRG: 390 | End: 2019-04-09
Attending: INTERNAL MEDICINE | Admitting: INTERNAL MEDICINE
Payer: COMMERCIAL

## 2019-04-08 VITALS
WEIGHT: 128.31 LBS | HEIGHT: 60 IN | WEIGHT: 128.31 LBS | BODY MASS INDEX: 25.19 KG/M2 | BODY MASS INDEX: 25.19 KG/M2 | HEIGHT: 60 IN

## 2019-04-08 VITALS — DIASTOLIC BLOOD PRESSURE: 72 MMHG | SYSTOLIC BLOOD PRESSURE: 171 MMHG | HEART RATE: 86 BPM | RESPIRATION RATE: 18 BRPM

## 2019-04-08 DIAGNOSIS — K56.600: Primary | ICD-10-CM

## 2019-04-08 DIAGNOSIS — I10: ICD-10-CM

## 2019-04-08 DIAGNOSIS — E11.9: ICD-10-CM

## 2019-04-08 DIAGNOSIS — Z85.42: ICD-10-CM

## 2019-04-08 LAB
ADD MAN DIFF?: NO
ALANINE AMINOTRANSFERASE: 11 IU/L (ref 13–69)
ALBUMIN/GLOBULIN RATIO: 1.13
ALBUMIN: 4.2 G/DL (ref 3.3–4.9)
ALKALINE PHOSPHATASE: 131 IU/L (ref 42–121)
ANION GAP: 13 (ref 5–13)
ASPARTATE AMINO TRANSFERASE: 22 IU/L (ref 15–46)
BASOPHIL #: 0 10^3/UL (ref 0–0.1)
BASOPHILS %: 0.4 % (ref 0–2)
BILIRUBIN,DIRECT: 0 MG/DL (ref 0–0.2)
BILIRUBIN,TOTAL: 0.3 MG/DL (ref 0.2–1.3)
BLOOD UREA NITROGEN: 15 MG/DL (ref 7–20)
CALCIUM: 9.2 MG/DL (ref 8.4–10.2)
CARBON DIOXIDE: 27 MMOL/L (ref 21–31)
CHLORIDE: 104 MMOL/L (ref 97–110)
CREATININE: 0.94 MG/DL (ref 0.44–1)
EOSINOPHILS #: 0.2 10^3/UL (ref 0–0.5)
EOSINOPHILS %: 2.9 % (ref 0–7)
GLOBULIN: 3.7 G/DL (ref 1.3–3.2)
GLUCOSE: 97 MG/DL (ref 70–220)
HEMATOCRIT: 39.8 % (ref 37–47)
HEMOGLOBIN: 12.3 G/DL (ref 12–16)
IMMATURE GRANS #M: 0.04 10^3/UL (ref 0–0.03)
IMMATURE GRANS % (M): 0.5 % (ref 0–0.43)
INR: 0.8
LIPASE: 177 U/L (ref 23–300)
LYMPHOCYTES #: 1.4 10^3/UL (ref 0.8–2.9)
LYMPHOCYTES %: 18.9 % (ref 15–51)
MEAN CORPUSCULAR HEMOGLOBIN: 25.8 PG (ref 29–33)
MEAN CORPUSCULAR HGB CONC: 30.9 G/DL (ref 32–37)
MEAN CORPUSCULAR VOLUME: 83.6 FL (ref 82–101)
MEAN PLATELET VOLUME: 11.4 FL (ref 7.4–10.4)
MONOCYTE #: 0.7 10^3/UL (ref 0.3–0.9)
MONOCYTES %: 9 % (ref 0–11)
NEUTROPHIL #: 5.2 10^3/UL (ref 1.6–7.5)
NEUTROPHILS %: 68.3 % (ref 39–77)
NUCLEATED RED BLOOD CELLS #: 0 10^3/UL (ref 0–0)
NUCLEATED RED BLOOD CELLS%: 0 /100WBC (ref 0–0)
PARTIAL THROMBOPLASTIN TIME: 23.4 SEC (ref 23–35)
PLATELET COUNT: 263 10^3/UL (ref 140–415)
POTASSIUM: 3.2 MMOL/L (ref 3.5–5.1)
PROTIME: 11.2 SEC (ref 11.9–14.9)
PT RATIO: 0.9
RED BLOOD COUNT: 4.76 10^6/UL (ref 4.2–5.4)
RED CELL DISTRIBUTION WIDTH: 16.6 % (ref 11.5–14.5)
SODIUM: 144 MMOL/L (ref 135–144)
TOTAL PROTEIN: 7.9 G/DL (ref 6.1–8.1)
WHITE BLOOD COUNT: 7.6 10^3/UL (ref 4.8–10.8)

## 2019-04-08 PROCEDURE — 85610 PROTHROMBIN TIME: CPT

## 2019-04-08 PROCEDURE — 74240 X-RAY XM UPR GI TRC 1CNTRST: CPT

## 2019-04-08 PROCEDURE — 80053 COMPREHEN METABOLIC PANEL: CPT

## 2019-04-08 PROCEDURE — 96374 THER/PROPH/DIAG INJ IV PUSH: CPT

## 2019-04-08 PROCEDURE — 71045 X-RAY EXAM CHEST 1 VIEW: CPT

## 2019-04-08 PROCEDURE — 87081 CULTURE SCREEN ONLY: CPT

## 2019-04-08 PROCEDURE — 74176 CT ABD & PELVIS W/O CONTRAST: CPT

## 2019-04-08 PROCEDURE — 85730 THROMBOPLASTIN TIME PARTIAL: CPT

## 2019-04-08 PROCEDURE — 83690 ASSAY OF LIPASE: CPT

## 2019-04-08 PROCEDURE — 74250 X-RAY XM SM INT 1CNTRST STD: CPT

## 2019-04-08 PROCEDURE — 96375 TX/PRO/DX INJ NEW DRUG ADDON: CPT

## 2019-04-08 PROCEDURE — 99285 EMERGENCY DEPT VISIT HI MDM: CPT

## 2019-04-08 PROCEDURE — 85025 COMPLETE CBC W/AUTO DIFF WBC: CPT

## 2019-04-08 RX ADMIN — ONDANSETRON HYDROCHLORIDE 1 MG: 2 INJECTION, SOLUTION INTRAMUSCULAR; INTRAVENOUS at 22:09

## 2019-04-08 RX ADMIN — LIDOCAINE HYDROCHLORIDE 1 MLS/HR: 10 INJECTION, SOLUTION EPIDURAL; INFILTRATION; INTRACAUDAL; PERINEURAL at 22:09

## 2019-04-08 NOTE — ERD
ER Documentation


Chief Complaint


Chief Complaint





ap +n/v today. hx of abd mass removal 3/4/19.





HPI


This is a very pleasant 58-year-old female comes in with points of epigastric 


and lower quadrant abdominal pain that started at 1:00 today.  It was 


accompanied by nausea and 2 episodes nonbilious nonbloody vomiting.  Pain is 


mild to moderate intensity with no exacerbating alleviating factors and has been


essentially steady since 130.  No fevers or chills.  No recent travel.  Denies 


diarrhea.  She does have history of recent abdominal mass removal surgery 


approximately 1 month ago.





ROS


All systems reviewed and are negative except as per history of present illness.





Medications


Home Meds


Active Scripts


Hydrocodone Bit-Acetaminophen (Hydrocodone Bit-APAP) 5-325MG Tablet, 1 TAB PO 


Q6H PRN for PAIN LEVEL 6-10 for 4 Days, #20 TAB


   Prov:JOY RAMIREZ MD         3/11/19


Hydrocodone/Acetaminophen (Norco 5-325 Tablet) 1 Each Tablet, 1 EACH PO Q4, #20 


TAB


   Prov:JOY RAMIREZ MD         3/1/19


Reported Medications


Tramadol Hcl* (Ultram*) 50 Mg Tablet, 50 MG PO Q6H PRN for PAIN, #15 TAB


   19


Empagliflozin (Jardiance) 10 Mg Tablet, 10 MG PO DAILY, TAB


   18


Omeprazole* (Omeprazole*) 40 Mg Capsule.dr, 40 MG PO DAILY, #30 CAP


   18


Carvedilol* (Carvedilol*) 3.125 Mg Tablet, 3.125 MG PO BID, #60 TAB


   18


Benazepril Hcl* (Benazepril Hcl*) 40 Mg Tablet, 40 MG PO DAILY, #30 TAB


   18





Allergies


Allergies:  


Coded Allergies:  


     No Known Allergy (Unverified , 19)





PMhx/Soc


History of Surgery:  No (hysterectomy )


Anesthesia Reaction:  No


Hx Neurological Disorder:  No


Hx Respiratory Disorders:  No


Hx Cardiac Disorders:  Yes (HTN)


Hx Psychiatric Problems:  No


Hx Miscellaneous Medical Probl:  Yes (HTN,DME,ovarian Ca)


Hx Alcohol Use:  No


Hx Substance Use:  No


Hx Tobacco Use:  No





Physical Exam


Vitals





Vital Signs


  Date      Temp  Pulse  Resp  B/P (MAP)   Pulse Ox  O2          O2 Flow    FiO2


Time                                                 Delivery    Rate


    19  98.0     78    20      178/84       100


     19:54                          (115)





Physical Exam


Const:   No acute distress


Head:   Atraumatic 


Eyes:    Normal Conjunctiva


ENT:    Normal External Ears, Nose and Mouth.


Neck:               Full range of motion. No meningismus.


Resp:   Clear to auscultation bilaterally


Cardio:   Regular rate and rhythm, no murmurs


Abd:    Soft, non tender, non distended. Normal bowel sounds


Skin:   No petechiae or rashes


Back:   No midline or flank tenderness


Ext:    No cyanosis, or edema


Neur:   Awake and alert


Psych:    Normal Mood and Affect


Result Diagram:  


19





Results 24 hrs





Laboratory Tests


              Test
                                  19
22:22


              White Blood Count                      7.6 10^3/ul


              Red Blood Count                       4.76 10^6/ul


              Hemoglobin                               12.3 g/dl


              Hematocrit                                  39.8 %


              Mean Corpuscular Volume                    83.6 fl


              Mean Corpuscular Hemoglobin                25.8 pg


              Mean Corpuscular Hemoglobin
Concent     30.9 g/dl 



              Red Cell Distribution Width                 16.6 %


              Platelet Count                         263 10^3/UL


              Mean Platelet Volume                       11.4 fl


              Immature Granulocytes %                    0.500 %


              Neutrophils %                               68.3 %


              Lymphocytes %                               18.9 %


              Monocytes %                                  9.0 %


              Eosinophils %                                2.9 %


              Basophils %                                  0.4 %


              Nucleated Red Blood Cells %            0.0 /100WBC


              Immature Granulocytes #              0.040 10^3/ul


              Neutrophils #                          5.2 10^3/ul


              Lymphocytes #                          1.4 10^3/ul


              Monocytes #                            0.7 10^3/ul


              Eosinophils #                          0.2 10^3/ul


              Basophils #                            0.0 10^3/ul


              Nucleated Red Blood Cells #            0.0 10^3/ul


              Prothrombin Time                          11.2 Sec


              Prothrombin Time Ratio                         0.9


              INR International Normalized
Ratio           0.80 



              Activated Partial
Thromboplast Time      23.4 Sec 



              Sodium Level                            144 mmol/L


              Potassium Level                         3.2 mmol/L


              Chloride Level                          104 mmol/L


              Carbon Dioxide Level                     27 mmol/L


              Anion Gap                                       13


              Blood Urea Nitrogen                       15 mg/dl


              Creatinine                              0.94 mg/dl


              Est Glomerular Filtrat Rate
mL/min   > 60 mL/min 



              Glucose Level                             97 mg/dl


              Calcium Level                            9.2 mg/dl


              Total Bilirubin                          0.3 mg/dl


              Direct Bilirubin                        0.00 mg/dl


              Indirect Bilirubin                       0.3 mg/dl


              Aspartate Amino Transf
(AST/SGOT)         22 IU/L 



              Alanine Aminotransferase
(ALT/SGPT)       11 IU/L 



              Alkaline Phosphatase                      131 IU/L


              Total Protein                             7.9 g/dl


              Albumin                                   4.2 g/dl


              Globulin                                 3.70 g/dl


              Albumin/Globulin Ratio                        1.13


              Lipase                                     177 U/L





Current Medications


 Medications
   Dose
          Sig/Gracia
       Start Time
   Status  Last


 (Trade)       Ordered        Route
 PRN     Stop Time              Admin
Dose


                              Reason                                Admin


 Sodium         500 ml @ 
     Q1H STAT
      19        DC            19


Chloride       500 mls/hr     IV
            21:39
 19                22:09



                                             22:38


 Morphine       4 mg           ONCE  STAT
    19        DC            19


Sulfate
                      IV
            21:39
 19                22:09



(morphine)                                   21:40


 Ondansetron    4 mg           ONCE  STAT
    19        DC            19


HCl
  (Zofran                 IV
            21:39
 19                22:09



Inj)                                         21:40








Procedures/MDM


Emergency department course: Patient was seen and evaluated had intravenous 


access established.  Given fluid bolus.  Given pain medication.  Had a stat CT 


scan.





Milka data: CT scan shows evidence of partial small bowel obstruction.  Please 


see the radiologist full dictation for full report.





Medical decision makin-year-old female with partial SBO.  Patient has been 


admitted to Dr. Stephen.  Dr. Angela consulted from surgery.





Departure


Diagnosis:  


   Primary Impression:  


   Abdominal pain


   Abdominal location:  unspecified location  Qualified Codes:  R10.9 - 


   Unspecified abdominal pain


   Additional Impression:  


   Partial small bowel obstruction


Condition:  Stable











SUNITHA ROBERT              2019 23:32

## 2019-04-09 VITALS — DIASTOLIC BLOOD PRESSURE: 63 MMHG | RESPIRATION RATE: 19 BRPM | HEART RATE: 81 BPM | SYSTOLIC BLOOD PRESSURE: 132 MMHG

## 2019-04-09 RX ADMIN — ONDANSETRON HYDROCHLORIDE 1 MG: 2 INJECTION, SOLUTION INTRAMUSCULAR; INTRAVENOUS at 03:01

## 2019-04-09 RX ADMIN — MORPHINE SULFATE 1 MG: 2 INJECTION, SOLUTION INTRAMUSCULAR; INTRAVENOUS at 03:02

## 2019-04-09 RX ADMIN — IOHEXOL 1 ML: 300 INJECTION, SOLUTION INTRAVENOUS at 10:32

## 2019-04-09 RX ADMIN — FOLIC ACID SCH MLS/HR: 5 INJECTION, SOLUTION INTRAMUSCULAR; INTRAVENOUS; SUBCUTANEOUS at 01:24

## 2019-04-09 RX ADMIN — FOLIC ACID SCH MLS/HR: 5 INJECTION, SOLUTION INTRAMUSCULAR; INTRAVENOUS; SUBCUTANEOUS at 13:54

## 2019-04-09 RX ADMIN — ASCORBIC ACID, VITAMIN A PALMITATE, CHOLECALCIFEROL, THIAMINE HYDROCHLORIDE, RIBOFLAVIN-5 PHOSPHATE SODIUM, PYRIDOXINE HYDROCHLORIDE, NIACINAMIDE, DEXPANTHENOL, ALPHA-TOCOPHEROL ACETATE, VITAMIN K1, FOLIC ACID, BIOTIN, CYANOCOBALAMIN 1 MLS/HR: 200; 3300; 200; 6; 3.6; 6; 40; 15; 10; 150; 600; 60; 5 INJECTION, SOLUTION INTRAVENOUS at 01:24

## 2019-04-09 RX ADMIN — ASCORBIC ACID, VITAMIN A PALMITATE, CHOLECALCIFEROL, THIAMINE HYDROCHLORIDE, RIBOFLAVIN-5 PHOSPHATE SODIUM, PYRIDOXINE HYDROCHLORIDE, NIACINAMIDE, DEXPANTHENOL, ALPHA-TOCOPHEROL ACETATE, VITAMIN K1, FOLIC ACID, BIOTIN, CYANOCOBALAMIN 1 MLS/HR: 200; 3300; 200; 6; 3.6; 6; 40; 15; 10; 150; 600; 60; 5 INJECTION, SOLUTION INTRAVENOUS at 13:54

## 2019-04-09 NOTE — HP
DATE OF ADMISSION: 04/08/2019

 

CHIEF COMPLAINT:  Abdominal pain associated with nausea and vomiting.

 

HISTORY OF PRESENT ILLNESS:  A 58-year-old female with a history of recurrent ovarian cancer status-p
ost surgical resection of pelvic mass on 03/04/2019, presented to Emergency Room with complaint of ep
igastric and lower abdominal pain since 1:00.  The patient had 2 episodes of nonbloody vomiting.  She
 denies any fevers or chills.  No bright red blood per rectum.  No melena.  Patient is waiting to und
ergo chemotherapy.

 

PAST MEDICAL HISTORY:

1.  History of ovarian uterine cancer. 

2.  Recurrent uterine cancer. 

3.  Hypertension.

4.  Type 2 diabetes mellitus.

 

MEDICATIONS PRIOR TO ADMISSION: 

1.  Norco.

2.  Tramadol.

3.  Jardiance.  

4.  Omeprazole.

5.  Coreg.

6.  Benazepril.  

 

ALLERGIES:  The patient has no known drug allergies.

 

SOCIAL HISTORY:  The patient denies tobacco or alcohol use.

 

PHYSICAL EXAMINATION:

GENERAL:  Well-developed, well-nourished female who is in no apparent distress.

VITAL SIGNS:  Stable.  She is afebrile.

HEENT:  Extraocular muscles intact.  Pupils equal and reactive to light bilaterally.  Sclerae are ani
cteric.  Oropharynx is clear and moist.

NECK:  Supple, no JVD, no carotid bruits.

LUNGS:  Clear to auscultation bilaterally.

CARDIAC:  Regular rate and rhythm.  No murmurs or gallops.

ABDOMEN:  Soft, mild epigastric and lower abdominal tenderness to palpation.  No rebound or guarding.
  Normoactive bowel sounds.

EXTREMITIES:  No clubbing, cyanosis, or edema.

NEUROLOGICAL:  Nonfocal.

 

LABORATORY DATA:  White blood cell count 7.6, hemoglobin 12.3, platelets 263,000.  Sodium 144, potass
ium 3.2, chloride 104, bicarb 27, BUN 15, creatinine 0.94.  Lipase is 177.  Abdominal pelvic CT shows
 partial small bowel obstruction at the level of the distal ileum and anastomotic sutures, bilateral 
internal renal stents were noted with no evidence of hydronephrosis, 2.3 cm right adrenal mass was no
kiesha which was decreased in size compared to 02/23/2019.

 

ASSESSMENT:

1.  A 58-year-old female presenting with a partial small-bowel obstruction.

2.  History of recurrent uterine cancer status-post resection on 03/04/2019.

3.  Hypertension.

4.  Type 2 diabetes mellitus.

 

PLAN:

1.  Place in Med/Surg observation.

2.  Nothing by mouth. 

3.  IV fluid hydration.

4.  Pain control.

5.  Proceed with upper GI and small bowel follow through.

 

Dr. Brown was notified of this admission.

 

 

Dictated By: JOY RAMIREZ MD

 

SK/NTS

DD:    04/09/2019 10:40:09

DT:    04/09/2019 12:50:06

Conf#: [id number]

DID#:  [id number]

CC: JOY RAMIREZ MD;*EndCC*

## 2019-04-09 NOTE — PDOCDIS
Discharge Instructions


CONDITION


                 [id number]
Patient Condition:  [id number]
Good








HOME CARE INSTRUCTIONS:


               [id number]
Diet Instructions:  Zdaor0k
       [id number]
Activity Restrictions:  [id number]
No Restrictions








FOLLOW UP/APPOINTMENTS


Follow-up Plan


pcp 1 week





Oncology 1 week











JOY RAMIREZ MD                   Apr 9, 2019 10:42

## 2019-04-11 NOTE — DS
DATE OF ADMISSION: 04/08/2019

DATE OF DISCHARGE: 04/09/2019

 

DISCHARGE DIAGNOSES:

1.  A 58-year-old female with partial small-bowel obstruction, resolved.

2.  Recurrent uterine cancer, status post pelvic mass resection on 03/04/2019.

3.  Hypertension.

4.  Type 2 diabetes mellitus.

 

PROCEDURES DURING HOSPITALIZATION:  Upper GI with small bowel follow through.

 

HOSPITAL COURSE:  A 58-year-old female with history of recurrent uterine cancer status post pelvic ma
ss resection on 03/04/2019, presented with complaint of epigastric and lower abdominal pain associate
d with vomiting.  Initial CAT scan suggested a partial small-bowel obstruction.  The patient was made
 n.p.o.  She underwent upper GI with small bowel follow through.  The study was unremarkable and ther
e was no evidence of SBO.  The patient was discharged home in a stable condition.  She was asked to f
ollow up with her PCP and oncologist.

 

 

Dictated By: JOY PARISI/CHONG

DD:    04/10/2019 10:06:06

DT:    04/11/2019 02:23:09

Conf#: [id number]

DID#:  [id number]

CC: LILY GUAJARDO MD;*EndCC*

## 2019-04-17 ENCOUNTER — HOSPITAL ENCOUNTER (INPATIENT)
Dept: HOSPITAL 91 - E/R | Age: 59
LOS: 2 days | Discharge: HOME | DRG: 392 | End: 2019-04-19
Payer: COMMERCIAL

## 2019-04-17 ENCOUNTER — HOSPITAL ENCOUNTER (INPATIENT)
Dept: HOSPITAL 10 - E/R | Age: 59
LOS: 2 days | Discharge: HOME | DRG: 392 | End: 2019-04-19
Attending: PEDIATRICS | Admitting: PEDIATRICS
Payer: COMMERCIAL

## 2019-04-17 VITALS
HEIGHT: 60 IN | WEIGHT: 130.07 LBS | HEIGHT: 60 IN | BODY MASS INDEX: 25.54 KG/M2 | WEIGHT: 130.07 LBS | BODY MASS INDEX: 25.54 KG/M2

## 2019-04-17 DIAGNOSIS — I10: ICD-10-CM

## 2019-04-17 DIAGNOSIS — E11.9: ICD-10-CM

## 2019-04-17 DIAGNOSIS — E87.5: ICD-10-CM

## 2019-04-17 DIAGNOSIS — C56.9: ICD-10-CM

## 2019-04-17 DIAGNOSIS — R11.2: ICD-10-CM

## 2019-04-17 DIAGNOSIS — R10.9: Primary | ICD-10-CM

## 2019-04-17 LAB
ADD MAN DIFF?: NO
ADD UMIC: YES
ALANINE AMINOTRANSFERASE: 23 IU/L (ref 13–69)
ALBUMIN/GLOBULIN RATIO: 1.08
ALBUMIN: 3.9 G/DL (ref 3.3–4.9)
ALKALINE PHOSPHATASE: 121 IU/L (ref 42–121)
ANION GAP: 12 (ref 5–13)
ASPARTATE AMINO TRANSFERASE: 23 IU/L (ref 15–46)
BASOPHIL #: 0.1 10^3/UL (ref 0–0.1)
BASOPHILS %: 1 % (ref 0–2)
BILIRUBIN,DIRECT: 0 MG/DL (ref 0–0.2)
BILIRUBIN,TOTAL: 0.2 MG/DL (ref 0.2–1.3)
BLOOD UREA NITROGEN: 12 MG/DL (ref 7–20)
CALCIUM: 8.8 MG/DL (ref 8.4–10.2)
CARBON DIOXIDE: 25 MMOL/L (ref 21–31)
CHLORIDE: 106 MMOL/L (ref 97–110)
CREATININE: 0.74 MG/DL (ref 0.44–1)
EOSINOPHILS #: 0.2 10^3/UL (ref 0–0.5)
EOSINOPHILS %: 2.8 % (ref 0–7)
GLOBULIN: 3.6 G/DL (ref 1.3–3.2)
GLUCOSE: 100 MG/DL (ref 70–220)
HEMATOCRIT: 38.6 % (ref 37–47)
HEMOGLOBIN: 12 G/DL (ref 12–16)
IMMATURE GRANS #M: 0.03 10^3/UL (ref 0–0.03)
IMMATURE GRANS % (M): 0.4 % (ref 0–0.43)
LIPASE: 184 U/L (ref 23–300)
LYMPHOCYTES #: 1.4 10^3/UL (ref 0.8–2.9)
LYMPHOCYTES %: 18.8 % (ref 15–51)
MEAN CORPUSCULAR HEMOGLOBIN: 26 PG (ref 29–33)
MEAN CORPUSCULAR HGB CONC: 31.1 G/DL (ref 32–37)
MEAN CORPUSCULAR VOLUME: 83.7 FL (ref 82–101)
MEAN PLATELET VOLUME: 12.2 FL (ref 7.4–10.4)
MONOCYTE #: 0.7 10^3/UL (ref 0.3–0.9)
MONOCYTES %: 10 % (ref 0–11)
NEUTROPHIL #: 4.9 10^3/UL (ref 1.6–7.5)
NEUTROPHILS %: 67 % (ref 39–77)
NUCLEATED RED BLOOD CELLS #: 0 10^3/UL (ref 0–0)
NUCLEATED RED BLOOD CELLS%: 0 /100WBC (ref 0–0)
PLATELET COUNT: 278 10^3/UL (ref 140–415)
POTASSIUM: 3.3 MMOL/L (ref 3.5–5.1)
RED BLOOD COUNT: 4.61 10^6/UL (ref 4.2–5.4)
RED CELL DISTRIBUTION WIDTH: 16.1 % (ref 11.5–14.5)
SODIUM: 143 MMOL/L (ref 135–144)
TOTAL PROTEIN: 7.5 G/DL (ref 6.1–8.1)
UR ASCORBIC ACID: NEGATIVE MG/DL
UR BACTERIA: (no result) /HPF
UR BILIRUBIN (DIP): NEGATIVE MG/DL
UR BLOOD (DIP): (no result) MG/DL
UR CLARITY: CLEAR
UR COLOR: (no result)
UR GLUCOSE (DIP): (no result) MG/DL
UR KETONES (DIP): NEGATIVE MG/DL
UR LEUKOCYTE ESTERASE (DIP): (no result) LEU/UL
UR NITRITE (DIP): NEGATIVE MG/DL
UR PH (DIP): 8 (ref 5–9)
UR RBC: 81 /HPF (ref 0–5)
UR SPECIFIC GRAVITY (DIP): 1.01 (ref 1–1.03)
UR SQUAMOUS EPITHELIAL CELL: (no result) /HPF
UR TOTAL PROTEIN (DIP): NEGATIVE MG/DL
UR UROBILINOGEN (DIP): NEGATIVE MG/DL
UR WBC: 24 /HPF (ref 0–5)
WHITE BLOOD COUNT: 7.2 10^3/UL (ref 4.8–10.8)

## 2019-04-17 PROCEDURE — 80048 BASIC METABOLIC PNL TOTAL CA: CPT

## 2019-04-17 PROCEDURE — 96374 THER/PROPH/DIAG INJ IV PUSH: CPT

## 2019-04-17 PROCEDURE — 84436 ASSAY OF TOTAL THYROXINE: CPT

## 2019-04-17 PROCEDURE — 74177 CT ABD & PELVIS W/CONTRAST: CPT

## 2019-04-17 PROCEDURE — 36415 COLL VENOUS BLD VENIPUNCTURE: CPT

## 2019-04-17 PROCEDURE — 71045 X-RAY EXAM CHEST 1 VIEW: CPT

## 2019-04-17 PROCEDURE — 83036 HEMOGLOBIN GLYCOSYLATED A1C: CPT

## 2019-04-17 PROCEDURE — 96375 TX/PRO/DX INJ NEW DRUG ADDON: CPT

## 2019-04-17 PROCEDURE — 81001 URINALYSIS AUTO W/SCOPE: CPT

## 2019-04-17 PROCEDURE — 74018 RADEX ABDOMEN 1 VIEW: CPT

## 2019-04-17 PROCEDURE — 84443 ASSAY THYROID STIM HORMONE: CPT

## 2019-04-17 PROCEDURE — 80053 COMPREHEN METABOLIC PANEL: CPT

## 2019-04-17 PROCEDURE — 74176 CT ABD & PELVIS W/O CONTRAST: CPT

## 2019-04-17 PROCEDURE — 82962 GLUCOSE BLOOD TEST: CPT

## 2019-04-17 PROCEDURE — 87081 CULTURE SCREEN ONLY: CPT

## 2019-04-17 PROCEDURE — 83690 ASSAY OF LIPASE: CPT

## 2019-04-17 PROCEDURE — 85025 COMPLETE CBC W/AUTO DIFF WBC: CPT

## 2019-04-17 PROCEDURE — 99285 EMERGENCY DEPT VISIT HI MDM: CPT

## 2019-04-17 PROCEDURE — 84479 ASSAY OF THYROID (T3 OR T4): CPT

## 2019-04-17 RX ADMIN — ONDANSETRON HYDROCHLORIDE 1 MG: 2 INJECTION, SOLUTION INTRAMUSCULAR; INTRAVENOUS at 22:40

## 2019-04-17 RX ADMIN — LIDOCAINE HYDROCHLORIDE 1 MLS/HR: 10 INJECTION, SOLUTION EPIDURAL; INFILTRATION; INTRACAUDAL; PERINEURAL at 22:40

## 2019-04-18 VITALS — DIASTOLIC BLOOD PRESSURE: 73 MMHG | SYSTOLIC BLOOD PRESSURE: 152 MMHG | HEART RATE: 76 BPM | RESPIRATION RATE: 20 BRPM

## 2019-04-18 VITALS — RESPIRATION RATE: 19 BRPM | DIASTOLIC BLOOD PRESSURE: 65 MMHG | HEART RATE: 85 BPM | SYSTOLIC BLOOD PRESSURE: 136 MMHG

## 2019-04-18 VITALS — HEART RATE: 134 BPM | DIASTOLIC BLOOD PRESSURE: 63 MMHG | SYSTOLIC BLOOD PRESSURE: 134 MMHG | RESPIRATION RATE: 18 BRPM

## 2019-04-18 VITALS — SYSTOLIC BLOOD PRESSURE: 154 MMHG | RESPIRATION RATE: 18 BRPM | HEART RATE: 77 BPM | DIASTOLIC BLOOD PRESSURE: 74 MMHG

## 2019-04-18 RX ADMIN — HYDROMORPHONE HYDROCHLORIDE 1 MG: 1 INJECTION, SOLUTION INTRAMUSCULAR; INTRAVENOUS; SUBCUTANEOUS at 00:39

## 2019-04-18 RX ADMIN — DEXTROSE, SODIUM CHLORIDE, AND POTASSIUM CHLORIDE 1 MLS/HR: 5; .45; .15 INJECTION INTRAVENOUS at 23:27

## 2019-04-18 RX ADMIN — DEXTROSE, SODIUM CHLORIDE, AND POTASSIUM CHLORIDE 1 MLS/HR: 5; .45; .15 INJECTION INTRAVENOUS at 13:20

## 2019-04-18 RX ADMIN — DEXAMETHASONE SODIUM PHOSPHATE PRN MG: 10 INJECTION, SOLUTION INTRAMUSCULAR; INTRAVENOUS at 03:42

## 2019-04-18 RX ADMIN — DIPHENHYDRAMINE HYDROCHLORIDE SCH MG: 50 INJECTION, SOLUTION INTRAMUSCULAR; INTRAVENOUS at 23:44

## 2019-04-18 RX ADMIN — VASOPRESSIN 1: 20 INJECTION, SOLUTION INTRAMUSCULAR; SUBCUTANEOUS at 18:03

## 2019-04-18 RX ADMIN — MORPHINE SULFATE PRN MG: 2 INJECTION, SOLUTION INTRAMUSCULAR; INTRAVENOUS at 14:41

## 2019-04-18 RX ADMIN — FAMOTIDINE 1 MG: 10 INJECTION, SOLUTION INTRAVENOUS at 23:44

## 2019-04-18 RX ADMIN — POTASSIUM CHLORIDE SCH MLS/HR: 200 INJECTION, SOLUTION INTRAVENOUS at 14:40

## 2019-04-18 RX ADMIN — MORPHINE SULFATE 1 MG: 2 INJECTION, SOLUTION INTRAMUSCULAR; INTRAVENOUS at 03:34

## 2019-04-18 RX ADMIN — POTASSIUM CHLORIDE 1 MLS/HR: 200 INJECTION, SOLUTION INTRAVENOUS at 14:40

## 2019-04-18 RX ADMIN — DEXAMETHASONE SODIUM PHOSPHATE PRN MG: 10 INJECTION, SOLUTION INTRAMUSCULAR; INTRAVENOUS at 17:17

## 2019-04-18 RX ADMIN — ONDANSETRON HYDROCHLORIDE 1 MG: 2 INJECTION, SOLUTION INTRAMUSCULAR; INTRAVENOUS at 01:27

## 2019-04-18 RX ADMIN — MORPHINE SULFATE PRN MG: 2 INJECTION, SOLUTION INTRAMUSCULAR; INTRAVENOUS at 03:34

## 2019-04-18 RX ADMIN — ONDANSETRON HYDROCHLORIDE 1 MG: 2 INJECTION, SOLUTION INTRAMUSCULAR; INTRAVENOUS at 17:17

## 2019-04-18 RX ADMIN — IOHEXOL 1 ML: 300 INJECTION, SOLUTION INTRAVENOUS at 17:17

## 2019-04-18 RX ADMIN — MORPHINE SULFATE PRN MG: 2 INJECTION, SOLUTION INTRAMUSCULAR; INTRAVENOUS at 19:04

## 2019-04-18 RX ADMIN — DIPHENHYDRAMINE HYDROCHLORIDE SCH MG: 50 INJECTION, SOLUTION INTRAMUSCULAR; INTRAVENOUS at 08:06

## 2019-04-18 RX ADMIN — POTASSIUM CHLORIDE SCH MLS/HR: 200 INJECTION, SOLUTION INTRAVENOUS at 23:27

## 2019-04-18 RX ADMIN — MORPHINE SULFATE 1 MG: 2 INJECTION, SOLUTION INTRAMUSCULAR; INTRAVENOUS at 14:41

## 2019-04-18 RX ADMIN — FAMOTIDINE 1 MG: 10 INJECTION, SOLUTION INTRAVENOUS at 08:06

## 2019-04-18 RX ADMIN — MORPHINE SULFATE 1 MG: 2 INJECTION, SOLUTION INTRAMUSCULAR; INTRAVENOUS at 23:23

## 2019-04-18 RX ADMIN — SODIUM CHLORIDE 1 ML: 9 INJECTION, SOLUTION INTRAMUSCULAR; INTRAVENOUS; SUBCUTANEOUS at 18:03

## 2019-04-18 RX ADMIN — DEXTROSE, SODIUM CHLORIDE, AND POTASSIUM CHLORIDE SCH MLS/HR: 5; .45; .15 INJECTION INTRAVENOUS at 03:34

## 2019-04-18 RX ADMIN — DEXTROSE, SODIUM CHLORIDE, AND POTASSIUM CHLORIDE 1 MLS/HR: 5; .45; .15 INJECTION INTRAVENOUS at 03:34

## 2019-04-18 RX ADMIN — ONDANSETRON HYDROCHLORIDE 1 MG: 2 INJECTION, SOLUTION INTRAMUSCULAR; INTRAVENOUS at 03:42

## 2019-04-18 RX ADMIN — DEXTROSE, SODIUM CHLORIDE, AND POTASSIUM CHLORIDE SCH MLS/HR: 5; .45; .15 INJECTION INTRAVENOUS at 13:20

## 2019-04-18 RX ADMIN — DEXAMETHASONE SODIUM PHOSPHATE PRN MG: 10 INJECTION, SOLUTION INTRAMUSCULAR; INTRAVENOUS at 01:27

## 2019-04-18 RX ADMIN — MORPHINE SULFATE PRN MG: 2 INJECTION, SOLUTION INTRAMUSCULAR; INTRAVENOUS at 23:23

## 2019-04-18 RX ADMIN — MORPHINE SULFATE PRN MG: 2 INJECTION, SOLUTION INTRAMUSCULAR; INTRAVENOUS at 08:31

## 2019-04-18 RX ADMIN — MORPHINE SULFATE 1 MG: 2 INJECTION, SOLUTION INTRAMUSCULAR; INTRAVENOUS at 08:31

## 2019-04-18 RX ADMIN — IOHEXOL 1 ML: 300 INJECTION, SOLUTION INTRAVENOUS at 18:03

## 2019-04-18 RX ADMIN — POTASSIUM CHLORIDE 1 MLS/HR: 200 INJECTION, SOLUTION INTRAVENOUS at 23:27

## 2019-04-18 RX ADMIN — DEXTROSE, SODIUM CHLORIDE, AND POTASSIUM CHLORIDE SCH MLS/HR: 5; .45; .15 INJECTION INTRAVENOUS at 23:27

## 2019-04-18 RX ADMIN — MORPHINE SULFATE 1 MG: 2 INJECTION, SOLUTION INTRAMUSCULAR; INTRAVENOUS at 19:04

## 2019-04-18 NOTE — HP
DATE OF ADMISSION: 04/18/2019

 

CHIEF COMPLAINT:  Abdominal pain associated with nausea and vomiting.

 

HISTORY OF PRESENT ILLNESS:  A 58-year-old female with a recent history of pelvic mass due to recurre
nt urinary cancer, status post resection in early part of March 2019.  Presented to emergency room wi
th complaint of recurrent abdominal pain associated with nausea and 3 to 4 episodes of nonbilious, no
nbloody vomiting.  She was admitted recently for similar symptoms.  At that time, small bowel follow 
through did not show any evidence of SBO.  CAT scan of the abdomen and pelvis at this time shows a hi
gh-grade partial small bowel obstruction at the site of surgical anastomosis.  The report notes that 
this is unchanged from 9 days earlier.  Bilateral internal renal stents were noted with no hydronephr
osis.

 

PAST MEDICAL HISTORY:

1.  History of ovarian and uterine cancer.

2.  Hypertension.

3.  Recurrent hepatic mass, status post resection in March of 2019.

4.  Type 2 diabetes mellitus.

 

MEDICATIONS PRIOR TO ADMISSION:

1.  Coreg 3.125 mg b.i.d.

2.  Norco as needed.

3.  Omeprazole 40 mg daily.

4.  Jardiance 10 mg daily.

5.  Vitamin D supplement.

 

PHYSICAL EXAMINATION:

GENERAL:  Well-developed, well-nourished female who is in no apparent distress.  She complains of dif
fuse abdominal pain.

VITAL SIGNS:  Stable.  She is afebrile.

HEENT:  Extraocular muscles are intact.  Pupils equal and reactive to light bilaterally.  Sclerae are
 anicteric.  Oropharynx is clear and moist.

NECK:  Supple.  No JVD.  No carotid bruits.

LUNGS:  Clear to auscultation bilaterally.

CARDIAC:  Regular rate and rhythm.  No murmurs or gallops.

ABDOMEN:  Soft, diffusely tender to palpation.  Normoactive bowel sounds.

EXTREMITIES:  No clubbing, cyanosis, or edema.

NEUROLOGICAL:  Nonfocal.

 

LABORATORY DATA:  CBC is within normal limits.  BMP shows a potassium of 3.3.

 

ASSESSMENT:

1.  A 58-year-old female with recurrent abdominal pain associated with nausea and vomiting.

2.  Rule out small bowel obstruction (SBO).

3.  History of recurrent uterine cancer, status post mass resection.

4.  Hypertension.

5.  Type 2 diabetes mellitus.

6.  Hyperkalemia.

 

PLAN:  

1.  Admit to Med/Surg.

2.  N.p.o.

3.  CAT scan of the abdomen and pelvis with oral and IV contrast.

 

The case was discussed with Dr. Brown.

 

 

Dictated By: JOY PARISI/CHONG

DD:    04/18/2019 12:49:03

DT:    04/18/2019 13:51:34

Conf#: [id number]

DID#:  [id number]

CC: LILY BROWN MD;*EndCC*

## 2019-04-18 NOTE — ERD
ER Documentation


Chief Complaint


Chief Complaint





c/o abd pain. +n/v s/p bowel obstruction 2 weeks ago.





HPI


Is a 58-year-old female complains of abdominal pain.  She does have nausea and 


3-4 episodes of nonbilious nonbloody vomiting.  It started around 12:00 today.  


Abdominal pain is none localizing and diffuse in location.  No fevers or chills.


 No other current complaints.  Status post bowel obstruction 3 weeks ago as 


well.





ROS


All systems reviewed and are negative except as per history of present illness.





Medications


Home Meds


Active Scripts


Hydrocodone Bit-Acetaminophen (Hydrocodone Bit-APAP) 5-325MG Tablet, 1 TAB PO 


Q6H PRN for PAIN LEVEL 6-10 for 4 Days, #20 TAB


   Prov:JOY RAMIREZ MD         3/11/19


Hydrocodone/Acetaminophen (Norco 5-325 Tablet) 1 Each Tablet, 1 EACH PO Q4, #20 


TAB


   Prov:JOY RAMIREZ MD         3/1/19


Reported Medications


Ergocalciferol (Vitamin D2) (VITAMIN D2) 50,000 Unit Capsule, 64321 UNIT PO Q7D 


PRN for QWED, CAP


   19


Empagliflozin (Jardiance) 10 Mg Tablet, 10 MG PO DAILY, TAB


   18


Omeprazole* (Omeprazole*) 40 Mg Capsule.dr, 40 MG PO DAILY, #30 CAP


   18


Carvedilol* (Carvedilol*) 3.125 Mg Tablet, 3.125 MG PO BID, #60 TAB


   18


Discontinued Reported Medications


Tramadol Hcl* (Ultram*) 50 Mg Tablet, 50 MG PO Q6H PRN for PAIN, #15 TAB


   19


Benazepril Hcl* (Benazepril Hcl*) 40 Mg Tablet, 40 MG PO DAILY, #30 TAB


   18





Allergies


Allergies:  


Coded Allergies:  


     acetaminophen (Unverified  Allergy, Unknown, ITCHING, 19)


     hydrocodone (Unverified  Allergy, Unknown, ITCHING, 19)





PMhx/Soc


History of Surgery:  Yes (ABDOMINAL TUMOR REMOVAL SX ( 19))


Anesthesia Reaction:  No


Hx Neurological Disorder:  No


Hx Respiratory Disorders:  No


Hx Cardiac Disorders:  Yes (HTN)


Hx Psychiatric Problems:  No


Hx Miscellaneous Medical Probl:  Yes (HTN,DME,ovarian Ca)


Hx Alcohol Use:  No


Hx Substance Use:  No


Hx Tobacco Use:  No


Smoking Status:  Never smoker





Physical Exam


Vitals





Vital Signs


  Date      Temp  Pulse  Resp  B/P (MAP)   Pulse Ox  O2          O2 Flow    FiO2


Time                                                 Delivery    Rate


   19  98.4     99    20      195/92        99


     20:53                          (126)





Physical Exam


Const:   No acute distress


Head:   Atraumatic 


Eyes:    Normal Conjunctiva


ENT:    Normal External Ears, Nose and Mouth.


Neck:               Full range of motion. No meningismus.


Resp:   Clear to auscultation bilaterally


Cardio:   Regular rate and rhythm, no murmurs


Abd:    Soft, non tender, non distended. Normal bowel sounds


Skin:   No petechiae or rashes


Back:   No midline or flank tenderness


Ext:    No cyanosis, or edema


Neur:   Awake and alert


Psych:    Normal Mood and Affect


Result Diagram:  


19





Results 24 hrs





Laboratory Tests


              Test
                                  19
22:01


              White Blood Count                       7.2 10^3/ul


              Red Blood Count                        4.61 10^6/ul


              Hemoglobin                                12.0 g/dl


              Hematocrit                                   38.6 %


              Mean Corpuscular Volume                     83.7 fl


              Mean Corpuscular Hemoglobin                 26.0 pg


              Mean Corpuscular Hemoglobin
Concent      31.1 g/dl 



              Red Cell Distribution Width                  16.1 %


              Platelet Count                          278 10^3/UL


              Mean Platelet Volume                        12.2 fl


              Immature Granulocytes %                     0.400 %


              Neutrophils %                                67.0 %


              Lymphocytes %                                18.8 %


              Monocytes %                                  10.0 %


              Eosinophils %                                 2.8 %


              Basophils %                                   1.0 %


              Nucleated Red Blood Cells %             0.0 /100WBC


              Immature Granulocytes #               0.030 10^3/ul


              Neutrophils #                           4.9 10^3/ul


              Lymphocytes #                           1.4 10^3/ul


              Monocytes #                             0.7 10^3/ul


              Eosinophils #                           0.2 10^3/ul


              Basophils #                             0.1 10^3/ul


              Nucleated Red Blood Cells #             0.0 10^3/ul


              Urine Color                          STRAW


              Urine Clarity                        CLEAR


              Urine pH                                        8.0


              Urine Specific Gravity                        1.006


              Urine Ketones                        NEGATIVE mg/dL


              Urine Nitrite                        NEGATIVE mg/dL


              Urine Bilirubin                      NEGATIVE mg/dL


              Urine Urobilinogen                   NEGATIVE mg/dL


              Urine Leukocyte Esterase                  2+ Cherie/ul


              Urine Microscopic RBC                       81 /HPF


              Urine Microscopic WBC                       24 /HPF


              Urine Squamous Epithelial
Cells      FEW /HPF 



              Urine Bacteria                       FEW /HPF


              Urine Hemoglobin                           3+ mg/dL


              Urine Glucose                              2+ mg/dL


              Urine Total Protein                  NEGATIVE mg/dl


              Sodium Level                             143 mmol/L


              Potassium Level                          3.3 mmol/L


              Chloride Level                           106 mmol/L


              Carbon Dioxide Level                      25 mmol/L


              Anion Gap                                        12


              Blood Urea Nitrogen                        12 mg/dl


              Creatinine                               0.74 mg/dl


              Est Glomerular Filtrat Rate
mL/min   > 60 mL/min 



              Glucose Level                             100 mg/dl


              Calcium Level                             8.8 mg/dl


              Total Bilirubin                           0.2 mg/dl


              Direct Bilirubin                         0.00 mg/dl


              Indirect Bilirubin                        0.2 mg/dl


              Aspartate Amino Transf
(AST/SGOT)          23 IU/L 



              Alanine Aminotransferase
(ALT/SGPT)        23 IU/L 



              Alkaline Phosphatase                       121 IU/L


              Total Protein                              7.5 g/dl


              Albumin                                    3.9 g/dl


              Globulin                                  3.60 g/dl


              Albumin/Globulin Ratio                         1.08


              Lipase                                      184 U/L





Current Medications


 Medications
   Dose
          Sig/Gracia
       Start Time
   Status  Last


 (Trade)       Ordered        Route
 PRN     Stop Time              Admin
Dose


                              Reason                                Admin


 Sodium         500 ml @ 
     Q1H STAT
      19       DC           19


Chloride       500 mls/hr     IV
            22:32
                       22:40



                                             19 23:31


 Morphine       4 mg           ONCE  STAT
    19       DC           19


Sulfate
                      IV
            22:32
                       22:40



(morphine)                                   19 22:33


 Ondansetron    4 mg           ONCE  STAT
    19       DC           19


HCl
  (Zofran                 IV
            22:32
                       22:40



Inj)                                         19 22:33


                1 mg           ONCE  STAT
    19       DC           19


Hydromorphone                 IV
            00:27
                       00:39



HCl
                                         19 00:28


(Dilaudid)


 Carvedilol
    3.125 mg       BID
 PO
       19                



(Coreg)                                      09:00



 Potassium
     1,000 ml @ 
   Q10H
 IV
      19                



Chloride/Dext  100 mls/hr                    01:11



elis/
 Sod Cl


 IV Flush
      3 ml           PER            19                



(NS 3 ml)                     PROTOCOL
 IV
  01:30



 Ondansetron    4 mg           Q6H  PRN
      19


HCl
  (Zofran                 IV
            01:30
                       01:27



Inj)                          NAUSEA/VOMITI


                              NG


 Morphine       2 mg           Q4H  PRN
      19                



Sulfate
                      IV
 .SEVERE    01:30



(morphine)                    PAIN 7-10


 Famotidine
    20 mg          Q12
 IV
       19                



(Pepcid Iv)                                  09:00









Procedures/MDM


Medical decision m[age]-year-old female with another small bowel 


obstruction.  Patient will be admitted to Dr. Dowling who is on-call for the 


[facility].  Dr. Villatoro is been consulted for surgery.  NG tube is been placed as well





Departure


Diagnosis:  


   Primary Impression:  


   Abdominal pain


   Abdominal location:  unspecified location  Qualified Codes:  R10.9 - 


   Unspecified abdominal pain


   Additional Impression:  


   Small bowel obstruction


Condition:  Serious











SUNITHA ROBERT             2019 01:52

## 2019-04-19 VITALS — DIASTOLIC BLOOD PRESSURE: 74 MMHG | HEART RATE: 83 BPM | SYSTOLIC BLOOD PRESSURE: 141 MMHG | RESPIRATION RATE: 18 BRPM

## 2019-04-19 VITALS — RESPIRATION RATE: 18 BRPM | HEART RATE: 80 BPM | SYSTOLIC BLOOD PRESSURE: 136 MMHG | DIASTOLIC BLOOD PRESSURE: 72 MMHG

## 2019-04-19 VITALS — DIASTOLIC BLOOD PRESSURE: 58 MMHG | HEART RATE: 99 BPM | RESPIRATION RATE: 20 BRPM | SYSTOLIC BLOOD PRESSURE: 123 MMHG

## 2019-04-19 LAB
ABNORMAL IP MESSAGE: 1
ADD MAN DIFF?: NO
ANION GAP: 8 (ref 5–13)
BASOPHIL #: 0 10^3/UL (ref 0–0.1)
BASOPHILS %: 1 % (ref 0–2)
BLOOD UREA NITROGEN: 6 MG/DL (ref 7–20)
CALCIUM: 8.4 MG/DL (ref 8.4–10.2)
CARBON DIOXIDE: 29 MMOL/L (ref 21–31)
CHLORIDE: 102 MMOL/L (ref 97–110)
CREATININE: 0.7 MG/DL (ref 0.44–1)
EOSINOPHILS #: 0.1 10^3/UL (ref 0–0.5)
EOSINOPHILS %: 2 % (ref 0–7)
FREE THYROXINE INDEX (CALC): 3.81 UG/ML (ref 0.65–3.89)
GLUCOSE: 120 MG/DL (ref 70–220)
HEMATOCRIT: 36.7 % (ref 37–47)
HEMOGLOBIN A1C: 5.3 % (ref 0–5.9)
HEMOGLOBIN: 11.3 G/DL (ref 12–16)
IMMATURE GRANS #M: 0.02 10^3/UL (ref 0–0.03)
IMMATURE GRANS % (M): 0.5 % (ref 0–0.43)
LYMPHOCYTES #: 0.6 10^3/UL (ref 0.8–2.9)
LYMPHOCYTES %: 14.3 % (ref 15–51)
MEAN CORPUSCULAR HEMOGLOBIN: 26 PG (ref 29–33)
MEAN CORPUSCULAR HGB CONC: 30.8 G/DL (ref 32–37)
MEAN CORPUSCULAR VOLUME: 84.6 FL (ref 82–101)
MEAN PLATELET VOLUME: 11.8 FL (ref 7.4–10.4)
MONOCYTE #: 0.5 10^3/UL (ref 0.3–0.9)
MONOCYTES %: 12.1 % (ref 0–11)
NEUTROPHIL #: 2.9 10^3/UL (ref 1.6–7.5)
NEUTROPHILS %: 70.1 % (ref 39–77)
NUCLEATED RED BLOOD CELLS #: 0 10^3/UL (ref 0–0)
NUCLEATED RED BLOOD CELLS%: 0 /100WBC (ref 0–0)
PLATELET COUNT: 219 10^3/UL (ref 140–415)
POSITIVE DIFF: (no result)
POTASSIUM: 4.1 MMOL/L (ref 3.5–5.1)
RED BLOOD COUNT: 4.34 10^6/UL (ref 4.2–5.4)
RED CELL DISTRIBUTION WIDTH: 16.4 % (ref 11.5–14.5)
SODIUM: 139 MMOL/L (ref 135–144)
T3 UPTAKE: 34 % (ref 23.5–40.5)
T4 (THYROXINE): 11.2 UG/DL (ref 5.5–11)
THYROID STIMULATING HORMONE: 0.49 MIU/L (ref 0.47–4.68)
WHITE BLOOD COUNT: 4.1 10^3/UL (ref 4.8–10.8)

## 2019-04-19 RX ADMIN — DEXTROSE, SODIUM CHLORIDE, AND POTASSIUM CHLORIDE SCH MLS/HR: 5; .45; .15 INJECTION INTRAVENOUS at 07:11

## 2019-04-19 RX ADMIN — FAMOTIDINE 1 MG: 10 INJECTION, SOLUTION INTRAVENOUS at 09:16

## 2019-04-19 RX ADMIN — DIPHENHYDRAMINE HYDROCHLORIDE SCH MG: 50 INJECTION, SOLUTION INTRAMUSCULAR; INTRAVENOUS at 09:16

## 2019-04-19 RX ADMIN — DEXTROSE, SODIUM CHLORIDE, AND POTASSIUM CHLORIDE 1 MLS/HR: 5; .45; .15 INJECTION INTRAVENOUS at 07:11

## 2019-04-19 NOTE — PDOCDIS
Discharge Instructions


CONDITION


                 [id number]
Patient Condition:  Eetnf0x
Good








HOME CARE INSTRUCTIONS:


                [id number]
Diet Instructions:  [id number]







FOLLOW UP/APPOINTMENTS


Follow-up Plan


pcp 1 week





oncology 2 weeks











JOY RAMIREZ MD                  Apr 19, 2019 08:51

## 2019-04-19 NOTE — DS
DATE OF ADMISSION: 04/18/2019

DATE OF DISCHARGE: 04/19/2019

 

DISCHARGE DIAGNOSES:

1.  Abdominal pain associated with nausea and vomiting, resolved.

2.  Recurrent clear cell ovarian cancer.

3.  Status post pelvic mass resection on 03/04/2019.

4.  Hypertension.

 

PROCEDURES DURING HOSPITALIZATION:  CAT scan of the abdomen and pelvis with oral and IV contrast.

 

HOSPITAL COURSE:  A 58-year-old female with recent diagnosis of recurrent clear cell ovarian carcinom
a, status post pelvic mass resection on 03/04/2019, presented to Emergency Room with complaint of abd
ominal pain associated with nausea and vomiting.  Initial CAT scan suggested persistent partial SBO. 
 NG tube was placed and patient was kept n.p.o.  Case was discussed with Dr. Brown.  He recommended rep
eat CAT scan with oral and IV contrast.  This study did not show any evidence of obstruction.  There 
were post-surgical changes with interval decrease in the small bowel dilatation.  There was thickenin
g of the distal small bowel with near complete passage of the fecal-like material.  There was no evid
ence of pneumatosis.  The bilateral internal renal stents were in place with increased mild to modera
te bilateral hydronephrosis.  The patient is in a stable condition for discharge.  She can continue N
orco at home for pain control.  Chemotherapy will be started soon.  She has an appointment for 05/01/
2019.  The patient was started on full liquid diet and asked to advance as tolerated.

 

MEDICATIONS ON DISCHARGE:

1.  Coreg 3.125 mg b.i.d.

2.  Jardiance 10 mg daily.

3.  Vitamin D supplement.

4.  Norco 1 tablet every 4 hours as needed.

5.  Omeprazole 40 mg daily.

 

FOLLOW UP:  

1.  With PCP in 1 week.

2.  With oncology in 2 weeks.

 

 

Dictated By: JOY RAMIREZ MD

 

SK/NTS

DD:    04/19/2019 08:55:52

DT:    04/19/2019 10:42:17

Conf#: [id number]

DID#:  [id number]

CC: LIZBETH HERNANDEZ MD;*EndCC*

## 2019-06-28 ENCOUNTER — HOSPITAL ENCOUNTER (INPATIENT)
Dept: HOSPITAL 10 - E/R | Age: 59
LOS: 2 days | Discharge: HOME HEALTH | DRG: 445 | End: 2019-06-30
Attending: PEDIATRICS | Admitting: PEDIATRICS
Payer: COMMERCIAL

## 2019-06-28 ENCOUNTER — HOSPITAL ENCOUNTER (INPATIENT)
Dept: HOSPITAL 91 - E/R | Age: 59
LOS: 2 days | Discharge: HOME HEALTH | DRG: 445 | End: 2019-06-30
Payer: COMMERCIAL

## 2019-06-28 VITALS — DIASTOLIC BLOOD PRESSURE: 52 MMHG | RESPIRATION RATE: 17 BRPM | HEART RATE: 69 BPM | SYSTOLIC BLOOD PRESSURE: 99 MMHG

## 2019-06-28 VITALS
HEIGHT: 58 IN | BODY MASS INDEX: 25.54 KG/M2 | WEIGHT: 121.7 LBS | BODY MASS INDEX: 25.54 KG/M2 | BODY MASS INDEX: 25.54 KG/M2 | WEIGHT: 121.7 LBS | HEIGHT: 58 IN

## 2019-06-28 VITALS — DIASTOLIC BLOOD PRESSURE: 56 MMHG | SYSTOLIC BLOOD PRESSURE: 106 MMHG | HEART RATE: 68 BPM

## 2019-06-28 VITALS — HEART RATE: 67 BPM | RESPIRATION RATE: 18 BRPM | DIASTOLIC BLOOD PRESSURE: 59 MMHG | SYSTOLIC BLOOD PRESSURE: 109 MMHG

## 2019-06-28 VITALS — SYSTOLIC BLOOD PRESSURE: 105 MMHG | HEART RATE: 70 BPM | DIASTOLIC BLOOD PRESSURE: 48 MMHG | RESPIRATION RATE: 18 BRPM

## 2019-06-28 DIAGNOSIS — I10: ICD-10-CM

## 2019-06-28 DIAGNOSIS — Z90.710: ICD-10-CM

## 2019-06-28 DIAGNOSIS — K80.00: Primary | ICD-10-CM

## 2019-06-28 DIAGNOSIS — E11.9: ICD-10-CM

## 2019-06-28 DIAGNOSIS — D70.9: ICD-10-CM

## 2019-06-28 DIAGNOSIS — Z79.84: ICD-10-CM

## 2019-06-28 DIAGNOSIS — C56.1: ICD-10-CM

## 2019-06-28 DIAGNOSIS — D63.8: ICD-10-CM

## 2019-06-28 DIAGNOSIS — E87.6: ICD-10-CM

## 2019-06-28 LAB
ABNORMAL IP MESSAGE: 1
ADD MAN DIFF?: YES
ADD UMIC: YES
ALANINE AMINOTRANSFERASE: 215 IU/L (ref 13–69)
ALBUMIN/GLOBULIN RATIO: 1.17
ALBUMIN: 4 G/DL (ref 3.3–4.9)
ALKALINE PHOSPHATASE: 169 IU/L (ref 42–121)
ANION GAP: 14 (ref 5–13)
ANISOCYTOSIS: (no result) (ref 0–0)
ASPARTATE AMINO TRANSFERASE: 262 IU/L (ref 15–46)
BASOPHIL #M: 0 10^3/UL (ref 0–0)
BASOPHILS % (M): 1 % (ref 0–2)
BILIRUBIN,DIRECT: 0 MG/DL (ref 0–0.2)
BILIRUBIN,TOTAL: 0.6 MG/DL (ref 0.2–1.3)
BLOOD UREA NITROGEN: 15 MG/DL (ref 7–20)
CALCIUM: 7.9 MG/DL (ref 8.4–10.2)
CARBON DIOXIDE: 19 MMOL/L (ref 21–31)
CHLORIDE: 107 MMOL/L (ref 97–110)
CREATININE: 0.79 MG/DL (ref 0.44–1)
D-DIMER: 1041.54 NG/ML (ref ?–460)
ERYTHROCYTE SEDIMENTATION RATE: 60 MM/HR (ref 0–30)
GIANT THROMBO% (M): 4 % (ref 0–0)
GLOBULIN: 3.4 G/DL (ref 1.3–3.2)
GLUCOSE: 137 MG/DL (ref 70–220)
HAAIG REFLEX: (no result)
HEMATOCRIT: 29.7 % (ref 37–47)
HEMOGLOBIN: 9.6 G/DL (ref 12–16)
HEPATITIS B SURFACE ANTIGEN: NEGATIVE
IMMATURE GRANS #M: 0 10^3/UL (ref 0–0.03)
IMMATURE GRANS % (M): 0 % (ref 0–0.43)
LIPASE: 124 U/L (ref 23–300)
LYMPHOCYTES #M: 0.7 10^3/UL (ref 0.8–2.9)
LYMPHOCYTES % (M): 81 % (ref 15–51)
MEAN CORPUSCULAR HEMOGLOBIN: 26.9 PG (ref 29–33)
MEAN CORPUSCULAR HGB CONC: 32.3 G/DL (ref 32–37)
MEAN CORPUSCULAR VOLUME: 83.2 FL (ref 82–101)
MEAN PLATELET VOLUME: 11.5 FL (ref 7.4–10.4)
MICROCYTOSIS: (no result) (ref 0–0)
MONOCYTE #M: 0.1 10^3/UL (ref 0.3–0.9)
MONOCYTES % (M): 12 % (ref 0–11)
NUCLEATED RED BLOOD CELLS%: 0 /100WBC (ref 0–0)
PLASMA CELLS #M: 0 10^3/UL (ref 0–0)
PLASMAC%(M): 1 %
PLATELET COUNT: 170 10^3/UL (ref 140–415)
PLATELET ESTIMATE: NORMAL
POLYCHROMASIA: (no result) (ref 0–0)
POSITIVE DIFF: (no result)
POTASSIUM: 3.1 MMOL/L (ref 3.5–5.1)
RED BLOOD COUNT: 3.57 10^6/UL (ref 4.2–5.4)
RED CELL DISTRIBUTION WIDTH: 17.1 % (ref 11.5–14.5)
SEGMENTED NEUTROPHILS (M) %: 5 % (ref 39–77)
SMUDGE%M: 6 % (ref 0–0)
SODIUM: 140 MMOL/L (ref 135–144)
TOTAL PROTEIN: 7.4 G/DL (ref 6.1–8.1)
TROPONIN-I: < 0.012 NG/ML (ref 0–0.12)
UR ASCORBIC ACID: NEGATIVE MG/DL
UR BILIRUBIN (DIP): NEGATIVE MG/DL
UR BLOOD (DIP): NEGATIVE MG/DL
UR CLARITY: CLEAR
UR COLOR: YELLOW
UR GLUCOSE (DIP): (no result) MG/DL
UR KETONES (DIP): NEGATIVE MG/DL
UR LEUKOCYTE ESTERASE (DIP): NEGATIVE LEU/UL
UR MUCUS: (no result) /HPF
UR NITRITE (DIP): NEGATIVE MG/DL
UR PH (DIP): 7 (ref 5–9)
UR RBC: 1 /HPF (ref 0–5)
UR SPECIFIC GRAVITY (DIP): 1.02 (ref 1–1.03)
UR SQUAMOUS EPITHELIAL CELL: (no result) /HPF
UR TOTAL PROTEIN (DIP): (no result) MG/DL
UR UROBILINOGEN (DIP): NEGATIVE MG/DL
UR WBC: 3 /HPF (ref 0–5)
WHITE BLOOD COUNT: 0.9 10^3/UL (ref 4.8–10.8)

## 2019-06-28 PROCEDURE — 71045 X-RAY EXAM CHEST 1 VIEW: CPT

## 2019-06-28 PROCEDURE — 86803 HEPATITIS C AB TEST: CPT

## 2019-06-28 PROCEDURE — 80076 HEPATIC FUNCTION PANEL: CPT

## 2019-06-28 PROCEDURE — 87340 HEPATITIS B SURFACE AG IA: CPT

## 2019-06-28 PROCEDURE — 93005 ELECTROCARDIOGRAM TRACING: CPT

## 2019-06-28 PROCEDURE — 78226 HEPATOBILIARY SYSTEM IMAGING: CPT

## 2019-06-28 PROCEDURE — 84484 ASSAY OF TROPONIN QUANT: CPT

## 2019-06-28 PROCEDURE — 84443 ASSAY THYROID STIM HORMONE: CPT

## 2019-06-28 PROCEDURE — A9537 TC99M MEBROFENIN: HCPCS

## 2019-06-28 PROCEDURE — 83690 ASSAY OF LIPASE: CPT

## 2019-06-28 PROCEDURE — 85378 FIBRIN DEGRADE SEMIQUANT: CPT

## 2019-06-28 PROCEDURE — 36415 COLL VENOUS BLD VENIPUNCTURE: CPT

## 2019-06-28 PROCEDURE — 85025 COMPLETE CBC W/AUTO DIFF WBC: CPT

## 2019-06-28 PROCEDURE — 76705 ECHO EXAM OF ABDOMEN: CPT

## 2019-06-28 PROCEDURE — 80053 COMPREHEN METABOLIC PANEL: CPT

## 2019-06-28 PROCEDURE — 99285 EMERGENCY DEPT VISIT HI MDM: CPT

## 2019-06-28 PROCEDURE — 86704 HEP B CORE ANTIBODY TOTAL: CPT

## 2019-06-28 PROCEDURE — 80048 BASIC METABOLIC PNL TOTAL CA: CPT

## 2019-06-28 PROCEDURE — 82962 GLUCOSE BLOOD TEST: CPT

## 2019-06-28 PROCEDURE — 86709 HEPATITIS A IGM ANTIBODY: CPT

## 2019-06-28 PROCEDURE — 83036 HEMOGLOBIN GLYCOSYLATED A1C: CPT

## 2019-06-28 PROCEDURE — 85651 RBC SED RATE NONAUTOMATED: CPT

## 2019-06-28 PROCEDURE — 81001 URINALYSIS AUTO W/SCOPE: CPT

## 2019-06-28 RX ADMIN — EMPAGLIFLOZIN SCH MG: 10 TABLET, FILM COATED ORAL at 09:00

## 2019-06-28 RX ADMIN — MORPHINE SULFATE PRN MG: 2 INJECTION, SOLUTION INTRAMUSCULAR; INTRAVENOUS at 22:46

## 2019-06-28 RX ADMIN — EMPAGLIFLOZIN 1 MG: 10 TABLET, FILM COATED ORAL at 09:00

## 2019-06-28 RX ADMIN — ENOXAPARIN SODIUM SCH MG: 100 INJECTION SUBCUTANEOUS at 08:59

## 2019-06-28 RX ADMIN — ENOXAPARIN SODIUM 1 MG: 100 INJECTION SUBCUTANEOUS at 08:59

## 2019-06-28 RX ADMIN — SODIUM CHLORIDE AND POTASSIUM CHLORIDE SCH MLS/HR: .9; .15 SOLUTION INTRAVENOUS at 08:54

## 2019-06-28 RX ADMIN — PIPERACILLIN SODIUM AND TAZOBACTAM SODIUM 1 MLS/HR: 3; .375 INJECTION, POWDER, LYOPHILIZED, FOR SOLUTION INTRAVENOUS at 06:49

## 2019-06-28 RX ADMIN — PIPERACILLIN SODIUM AND TAZOBACTAM SODIUM SCH MLS/HR: 3; .375 INJECTION, POWDER, LYOPHILIZED, FOR SOLUTION INTRAVENOUS at 21:40

## 2019-06-28 RX ADMIN — ONDANSETRON HYDROCHLORIDE 1 MG: 4 TABLET, FILM COATED ORAL at 08:59

## 2019-06-28 RX ADMIN — SODIUM CHLORIDE AND POTASSIUM CHLORIDE 1 MLS/HR: .9; .15 SOLUTION INTRAVENOUS at 15:11

## 2019-06-28 RX ADMIN — ONDANSETRON HYDROCHLORIDE 1 MG: 2 INJECTION, SOLUTION INTRAMUSCULAR; INTRAVENOUS at 05:24

## 2019-06-28 RX ADMIN — SODIUM CHLORIDE AND POTASSIUM CHLORIDE SCH MLS/HR: .9; .15 SOLUTION INTRAVENOUS at 15:11

## 2019-06-28 RX ADMIN — FAMOTIDINE 1 MG: 20 TABLET ORAL at 09:00

## 2019-06-28 RX ADMIN — FAMOTIDINE 1 MG: 20 TABLET ORAL at 20:35

## 2019-06-28 RX ADMIN — PIPERACILLIN SODIUM AND TAZOBACTAM SODIUM 1 MLS/HR: 3; .375 INJECTION, POWDER, LYOPHILIZED, FOR SOLUTION INTRAVENOUS at 13:27

## 2019-06-28 RX ADMIN — PIPERACILLIN SODIUM AND TAZOBACTAM SODIUM 1 MLS/HR: 3; .375 INJECTION, POWDER, LYOPHILIZED, FOR SOLUTION INTRAVENOUS at 21:40

## 2019-06-28 RX ADMIN — FILGRASTIM-AAFI 1 MCG: 480 INJECTION, SOLUTION SUBCUTANEOUS at 18:28

## 2019-06-28 RX ADMIN — SODIUM CHLORIDE AND POTASSIUM CHLORIDE 1 MLS/HR: .9; .15 SOLUTION INTRAVENOUS at 08:54

## 2019-06-28 RX ADMIN — PIPERACILLIN SODIUM AND TAZOBACTAM SODIUM SCH MLS/HR: 3; .375 INJECTION, POWDER, LYOPHILIZED, FOR SOLUTION INTRAVENOUS at 13:27

## 2019-06-28 RX ADMIN — FAMOTIDINE SCH MG: 20 TABLET ORAL at 20:35

## 2019-06-28 RX ADMIN — ONDANSETRON SCH MG: 4 TABLET, FILM COATED ORAL at 08:59

## 2019-06-28 RX ADMIN — FAMOTIDINE SCH MG: 20 TABLET ORAL at 09:00

## 2019-06-28 RX ADMIN — FILGRASTIM-AAFI SCH MCG: 480 INJECTION, SOLUTION SUBCUTANEOUS at 18:28

## 2019-06-28 RX ADMIN — MORPHINE SULFATE 1 MG: 2 INJECTION, SOLUTION INTRAMUSCULAR; INTRAVENOUS at 22:46

## 2019-06-28 NOTE — CONS
Assessment/Plan


Assessment/Plan


Assessment/Plan (Daily)


The possibility of cholecystitis should be entertained with HIDA scan.  If HIDA 


scan is positive, because of the recent chemotherapy and the hostile abdomen 


encountered during her last surgery 3 and half months ago, consideration can be 


given to percutaneous cholecystostomy.  Further recommendations for this patient


will be based on the patient's further work-up and clinical course.  I will 


follow with you.





Consultation Date/Type/Reason


Admit Date/Time


Jun 28, 2019 at 05:00


Date of Consultation:  Jun 28, 2019


Type of Consult


General surgery


Reason for Consultation


Right upper quadrant abdominal pain and gallstones


Date/Time of Note


DATE: 6/28/19 


TIME: 17:17





Hx of Present Illness


The patient is a 59-year-old female who is now 3-1/2 months status post 


exploratory laparotomy with resection of pelvic tumor and small bowel resection 


for what proved to be an ovarian carcinoma.  She recovered uneventfully and has 


been been treated with chemotherapy.  Her last chemotherapy was approximately 7 


days ago.  Since that last chemotherapy she has had progressive development of 


right back pain radiating to the right upper quadrant.  Imaging studies do inde


ed show that she has gallstones and gallbladder wall thickening with possible 


cholecystitis.  The patient has had no fevers, chills or jaundice.  The patient 


and daughter state that she is symptomatically improved since admission last 


night


Review of systems:


HEENT: Unremarkable


Pulmonary: No history of asthma, pneumonia or shortness of breath


Cardiac: No history of chest pain MI or arrhythmia


GI and : As in the HPI





Past Medical History


Medical History:  other (Ovarian carcinoma)


Home Meds


Reported Medications


Ondansetron Hcl* (Ondansetron Hcl*) 4 Mg Tablet, 4 MG PO DAILY for 30 Days


   6/28/19


Empagliflozin (Jardiance) 10 Mg Tablet, 10 MG PO DAILY, TAB


   12/29/18


Omeprazole* (Omeprazole*) 40 Mg Capsule.dr, 40 MG PO DAILY, #30 CAP


   12/29/18


Carvedilol* (Carvedilol*) 3.125 Mg Tablet, 3.125 MG PO BID, #60 TAB


   12/29/18


Discontinued Reported Medications


Ergocalciferol (Vitamin D2) (VITAMIN D2) 50,000 Unit Capsule, 77976 UNIT PO Q7D 


PRN for QWED, CAP


   4/18/19


Discontinued Scripts


Docusate Sodium* (Colace*) 100 Mg Capsule, 100 MG PO BID, #60 CAP


   Prov:JOY RAMIREZ MD         4/19/19


Hydrocodone/Acetaminophen (Norco 5-325 Tablet) 1 Each Tablet, 1 EACH PO Q4, #20 


TAB


   Prov:JOY RAMIREZ MD         3/1/19


Medications





Current Medications


Carvedilol (Coreg) 3.125 mg BID PO  Last administered on 6/28/19at 09:00; Admin 


Dose 3.125 MG;  Start 6/28/19 at 09:00


Empaglifozin (Jardiance) 10 mg DAILY PO  Last administered on 6/28/19at 09:00; 


Admin Dose 10 MG;  Start 6/28/19 at 09:00


Ondansetron HCl (Zofran Tab) 4 mg DAILY PO  Last administered on 6/28/19at 


08:59; Admin Dose 4 MG;  Start 6/28/19 at 09:00


Diagnostic Test (Pha) (Accu-Chek) 1 ea AC MEALS AND  BEDTIME XX ;  Start 6/28/19


at 07:00


Potassium Chloride/Sodium Chloride 1,000 ml @  100 mls/hr Q10H IV  Last 


administered on 6/28/19at 08:54; Admin Dose 100 MLS/HR;  Start 6/28/19 at 05:11


IV Flush (NS 3 ml) 3 ml PER PROTOCOL IV ;  Start 6/28/19 at 05:30


Morphine Sulfate (morphine) 2 mg Q4H  PRN IV .SEVERE PAIN 7-10;  Start 6/28/19 


at 05:30


Docusate Sodium (Colace) 100 mg Q12H  PRN PO .CONSTIPATION;  Start 6/28/19 at 


05:30


Famotidine (Pepcid) 20 mg Q12 PO  Last administered on 6/28/19at 09:00; Admin 


Dose 20 MG;  Start 6/28/19 at 09:00


Enoxaparin Sodium (Lovenox) 40 mg DAILY SC  Last administered on 6/28/19at 


08:59; Admin Dose 40 MG;  Start 6/28/19 at 09:00


Piperacillin Sod/ Tazobactam Sod 100 ml @  200 mls/hr Q8 IVPB  Last administered


on 6/28/19at 13:27; Admin Dose 200 MLS/HR;  Start 6/28/19 at 14:00


Filgrastim (Nivestym) 480 mcg DAILY@1700 SC ;  Start 6/28/19 at 17:00


Allergies:  


Coded Allergies:  


     acetaminophen (Unverified  Allergy, Unknown, ITCHING, 4/18/19)


     hydrocodone (Unverified  Allergy, Unknown, ITCHING, 4/18/19)





Past Surgical History


Past Surgical Hx:  bowel resection, other (Resection of pelvic mass)





Family History


Significant Family History:  no pertinent family hx





Social History


Smoking Status:  Never smoker





Exam/Review of Systems


Exam


Vitals





Vital Signs


  Date      Temp  Pulse  Resp  B/P (MAP)   Pulse Ox  O2          O2 Flow    FiO2


Time                                                 Delivery    Rate


   6/28/19  97.9     69    17  99/52 (68)       100  Room Air


     14:00





Constitutional:  alert, oriented


Psych:  no complaints


Head:  normocephalic


Eyes:  nl conjunctiva


ENMT:  nl external ears & nose


Neck:  supple


Respiratory:  clear to auscultation


Cardiovascular:  regular rate and rhythm


Gastrointestinal:  soft, non-tender, surgical scars (There is a well-healed 


vertical midline scar from xiphoid to pubis)


Musculoskeletal:  nl extremities to inspection


Extremities:  normal pulses


Neurological:  CNS II-XII intact


Skin:  nl turgor





Results


Result Diagram:  


6/28/19 0143                                                                    


           6/28/19 0143





Results 24hrs





Laboratory Tests


Test
                 6/28/19
01:43  6/28/19
06:43  6/28/19
07:43  6/28/19
08:24


White Blood Count           0.9  #L


Red Blood Count             3.57  L


Hemoglobin                   9.6  L


Hematocrit                  29.7  L


Mean Corpuscular             83.2


Volume


Mean Corpuscular            26.9  L


Hemoglobin


Mean Corpuscular            32.3  
  
              
              



Hemoglobin
Concent


Red Cell                    17.1  H


Distribution Width


Platelet Count               170  #


Mean Platelet Volume        11.5  H


Immature                   0.000  L


Granulocytes %


Neutrophils %


Segmented                     5  L
  
              
              



Neutrophils


%
(Manual)


Lymphocytes %


Lymphocytes %                 81  H


(Manual)


Monocytes %


Monocytes % (Manual)          12  H


Eosinophils %


Basophils %


Basophils % (Manual)            1


Plasma Cells %                  1


(manual)


Nucleated Red Blood           0.0


Cells %


Immature                    0.000


Granulocytes #


Neutrophils #


Lymphocytes (Manual)         0.7  L


Lymphocytes #


Monocytes #


Monocytes # (Manual)         0.1  L


Eosinophils #


Basophils #


Basophils # (Manual)          0.0


Plasma Cells #                0.0


(manual)


Nucleated Red Blood


Cells #


Platelet Estimate     NORMAL


Giant Platelets                4  H


Polychromasia                  1+


Anisocytosis                   1+


Microcytosis                   1+


Urine Color           YELLOW


Urine Clarity         CLEAR


Urine pH                      7.0


Urine Specific              1.021


Gravity


Urine Ketones         NEGATIVE


Urine Nitrite         NEGATIVE


Urine Bilirubin       NEGATIVE


Urine Urobilinogen    NEGATIVE


Urine Leukocyte       NEGATIVE


Esterase


Urine Microscopic               1


RBC


Urine Microscopic               3


WBC


Urine Squamous        FEW  
         
              
              



Epithelial
Cells


Urine Mucus           FEW  A


Urine Hemoglobin      NEGATIVE


Urine Glucose                 3+  H


Urine Total Protein           1+  H


Sodium Level                  140


Potassium Level              3.1  L


Chloride Level                107


Carbon Dioxide Level          19  L


Anion Gap                     14  H


Blood Urea Nitrogen            15


Creatinine                   0.79


Est Glomerular        > 60  
        
              
              



Filtrat Rate
mL/min


Glucose Level                 137


Calcium Level                7.9  L


Total Bilirubin               0.6


Direct Bilirubin             0.00


Indirect Bilirubin            0.6


Aspartate Amino             262  H
  
              
              



Transf
(AST/SGOT)


Alanine                     215  H
  
              
              



Aminotransferase
(AL


T/SGPT)


Alkaline Phosphatase         169  H


Total Protein                 7.4


Albumin                       4.0


Globulin                    3.40  H


Albumin/Globulin             1.17


Ratio


Lipase                        124


Bedside Glucose                              115            120


Erythrocyte                                                                60  H


Sedimentation Rate


D-Dimer                                                               1041.54  H


D-Dimer Comment


Troponin I                                                         < 0.012


Test
                 6/28/19
08:25  6/28/19
09:05  
              



Hepatitis B Surface   Pending


Antigen


Hepatitis B Core      Pending  
     
              
              



Total
Antibody


Hepatitis C Antibody  Pending


Bedside Glucose                              115








Medications


Medication





Current Medications


Carvedilol (Coreg) 3.125 mg BID PO  Last administered on 6/28/19at 09:00; Admin 


Dose 3.125 MG;  Start 6/28/19 at 09:00


Empaglifozin (Jardiance) 10 mg DAILY PO  Last administered on 6/28/19at 09:00; 


Admin Dose 10 MG;  Start 6/28/19 at 09:00


Ondansetron HCl (Zofran Tab) 4 mg DAILY PO  Last administered on 6/28/19at 


08:59; Admin Dose 4 MG;  Start 6/28/19 at 09:00


Diagnostic Test (Pha) (Accu-Chek) 1 ea AC MEALS AND  BEDTIME XX ;  Start 6/28/19


at 07:00


Potassium Chloride/Sodium Chloride 1,000 ml @  100 mls/hr Q10H IV  Last 


administered on 6/28/19at 08:54; Admin Dose 100 MLS/HR;  Start 6/28/19 at 05:11


IV Flush (NS 3 ml) 3 ml PER PROTOCOL IV ;  Start 6/28/19 at 05:30


Morphine Sulfate (morphine) 2 mg Q4H  PRN IV .SEVERE PAIN 7-10;  Start 6/28/19 


at 05:30


Docusate Sodium (Colace) 100 mg Q12H  PRN PO .CONSTIPATION;  Start 6/28/19 at 


05:30


Famotidine (Pepcid) 20 mg Q12 PO  Last administered on 6/28/19at 09:00; Admin 


Dose 20 MG;  Start 6/28/19 at 09:00


Enoxaparin Sodium (Lovenox) 40 mg DAILY SC  Last administered on 6/28/19at 


08:59; Admin Dose 40 MG;  Start 6/28/19 at 09:00


Piperacillin Sod/ Tazobactam Sod 100 ml @  200 mls/hr Q8 IVPB  Last administered


on 6/28/19at 13:27; Admin Dose 200 MLS/HR;  Start 6/28/19 at 14:00


Filgrastim (Nivestym) 480 mcg DAILY@1700 SC ;  Start 6/28/19 at 17:00











JESSICA ARROYO MD             Jun 28, 2019 17:22

## 2019-06-28 NOTE — ERD
ER Documentation


Chief Complaint


Chief Complaint





R back to R ab pain x 1.5 day; hx pelvic mass CA on ChemoTx





HPI


This is a 59-year-old female with a past medical history of ovarian cancer 


status post resection complicated by recurrence currently on chemotherapy who is


presenting with right-sided upper abdominal and back pain.  The patient reports 


that her pain starts in the back and radiates forward.  It is worse with 


movement, but continues at rest.  The patient does not recall any trauma or 


injury.  The patient does report that her pain has been getting progressively 


worse over the last several days since getting chemo.  The patient denies any 


dysuria or hematuria or urgency or frequency.  She denies any constipation or d


iarrhea.  She denies any black or bloody or tarry stools.  The patient does 


report this evening she had a transient now resolved episode of palpitations 


with moderate mid substernal pressure-like chest pain.  She did not have any 


shortness of breath.  She had no diaphoresis.  She had no nausea or vomiting.  


She had no lightheadedness or dizziness.





The patient denies feeling sick recently.  The patient denies fever or chills.  


The patient has had no headache or vision changes.  The patient does not endorse


neck pain.  The patient denies changes to bowel movements or urination.  The 


patient has had no focal deficits.  The patient has had no weakness or numbness 


or tingling to the face or extremities.





ROS


All systems reviewed and are negative except as per history of present illness.





Medications


Home Meds


Reported Medications


Ondansetron Hcl* (Ondansetron Hcl*) 4 Mg Tablet, 4 MG PO DAILY for 30 Days


   6/28/19


Empagliflozin (Jardiance) 10 Mg Tablet, 10 MG PO DAILY, TAB


   12/29/18


Omeprazole* (Omeprazole*) 40 Mg Capsule.dr, 40 MG PO DAILY, #30 CAP


   12/29/18


Carvedilol* (Carvedilol*) 3.125 Mg Tablet, 3.125 MG PO BID, #60 TAB


   12/29/18


Discontinued Reported Medications


Ergocalciferol (Vitamin D2) (VITAMIN D2) 50,000 Unit Capsule, 45064 UNIT PO Q7D 


PRN for QWED, CAP


   4/18/19


Discontinued Scripts


Docusate Sodium* (Colace*) 100 Mg Capsule, 100 MG PO BID, #60 CAP


   Prov:JOY RAMIREZ MD         4/19/19


Hydrocodone/Acetaminophen (Norco 5-325 Tablet) 1 Each Tablet, 1 EACH PO Q4, #20 


TAB


   Prov:JOY RAMIREZ MD         3/1/19





Allergies


Allergies:  


Coded Allergies:  


     acetaminophen (Unverified  Allergy, Unknown, ITCHING, 4/18/19)


     hydrocodone (Unverified  Allergy, Unknown, ITCHING, 4/18/19)





PMhx/Soc


History of Surgery:  Yes (abdominal tumor removal)


Anesthesia Reaction:  No


Hx Neurological Disorder:  No


Hx Respiratory Disorders:  No


Hx Cardiac Disorders:  No


Hx Psychiatric Problems:  No


Hx Miscellaneous Medical Probl:  Yes (DM. ovarian CA)


Hx Alcohol Use:  No


Hx Substance Use:  No


Hx Tobacco Use:  No


Smoking Status:  Never smoker





FmHx


Family History:  No diabetes





Physical Exam


Vitals





Vital Signs


  Date      Temp  Pulse  Resp  B/P (MAP)   Pulse Ox  O2          O2 Flow    FiO2


Time                                                 Delivery    Rate


   6/28/19           76    11      113/74        96  Room Air


     05:30                           (87)


   6/28/19           70    16      113/74       100  Room Air


     04:27                           (87)


   6/28/19  98.5     68    20      136/68       100


     00:52                           (90)





Physical Exam


Const:   No acute distress


Head:   Atraumatic 


Eyes:    Normal Conjunctiva


ENT:    Normal External Ears, Nose and Mouth.


Neck:                  Full range of motion. No meningismus.


Resp:   Clear to auscultation bilaterally


Cardio:   Regular rate and rhythm, no murmurs


Chest:   Right lower rib tenderness.


Abd:    Mild epigastric and right upper quadrant tenderness.  Soft, non 


distended. Normal bowel sounds


Skin:   No petechiae or rashes


Back:   No midline or flank tenderness


Ext:    No cyanosis, or edema


Neur:   Awake and alert


Psych:    Normal Mood and Affect


Result Diagram:  


6/28/19 0143                                                                    


           6/28/19 0143





Results 24 hrs





Laboratory Tests


              Test
                                  6/28/19
01:43


              White Blood Count                       0.9 10^3/ul


              Red Blood Count                        3.57 10^6/ul


              Hemoglobin                                 9.6 g/dl


              Hematocrit                                   29.7 %


              Mean Corpuscular Volume                     83.2 fl


              Mean Corpuscular Hemoglobin                 26.9 pg


              Mean Corpuscular Hemoglobin
Concent      32.3 g/dl 



              Red Cell Distribution Width                  17.1 %


              Platelet Count                          170 10^3/UL


              Mean Platelet Volume                        11.5 fl


              Immature Granulocytes %                     0.000 %


              Neutrophils %                         %


              Segmented Neutrophils %
(Manual)               5 % 



              Lymphocytes %                         %


              Lymphocytes % (Manual)                         81 %


              Monocytes %                           %


              Monocytes % (Manual)                           12 %


              Eosinophils %                         %


              Basophils %                           %


              Basophils % (Manual)                            1 %


              Plasma Cells % (manual)                         1 %


              Nucleated Red Blood Cells %             0.0 /100WBC


              Immature Granulocytes #               0.000 10^3/ul


              Neutrophils #                         10^3/ul


              Lymphocytes (Manual)                    0.7 10^3/ul


              Lymphocytes #                         10^3/ul


              Monocytes #                           10^3/ul


              Monocytes # (Manual)                    0.1 10^3/ul


              Eosinophils #                         10^3/ul


              Basophils #                           10^3/ul


              Basophils # (Manual)                    0.0 10^3/ul


              Plasma Cells # (manual)                 0.0 10^3/ul


              Nucleated Red Blood Cells #           10^3/ul


              Platelet Estimate                    NORMAL


              Giant Platelets                                 4 %


              Polychromasia                                    1+


              Anisocytosis                                     1+


              Microcytosis                                     1+


              Urine Color                          YELLOW


              Urine Clarity                        CLEAR


              Urine pH                                        7.0


              Urine Specific Gravity                        1.021


              Urine Ketones                        NEGATIVE mg/dL


              Urine Nitrite                        NEGATIVE mg/dL


              Urine Bilirubin                      NEGATIVE mg/dL


              Urine Urobilinogen                   NEGATIVE mg/dL


              Urine Leukocyte Esterase
            NEGATIVE
Cherie/ul


              Urine Microscopic RBC                        1 /HPF


              Urine Microscopic WBC                        3 /HPF


              Urine Squamous Epithelial
Cells      FEW /HPF 



              Urine Mucus                          FEW /HPF


              Urine Hemoglobin                     NEGATIVE mg/dL


              Urine Glucose                              3+ mg/dL


              Urine Total Protein                        1+ mg/dl


              Sodium Level                             140 mmol/L


              Potassium Level                          3.1 mmol/L


              Chloride Level                           107 mmol/L


              Carbon Dioxide Level                      19 mmol/L


              Anion Gap                                        14


              Blood Urea Nitrogen                        15 mg/dl


              Creatinine                               0.79 mg/dl


              Est Glomerular Filtrat Rate
mL/min   > 60 mL/min 



              Glucose Level                             137 mg/dl


              Calcium Level                             7.9 mg/dl


              Total Bilirubin                           0.6 mg/dl


              Direct Bilirubin                         0.00 mg/dl


              Indirect Bilirubin                        0.6 mg/dl


              Aspartate Amino Transf
(AST/SGOT)         262 IU/L 



              Alanine Aminotransferase
(ALT/SGPT)       215 IU/L 



              Alkaline Phosphatase                       169 IU/L


              Total Protein                              7.4 g/dl


              Albumin                                    4.0 g/dl


              Globulin                                  3.40 g/dl


              Albumin/Globulin Ratio                         1.17


              Lipase                                      124 U/L





Current Medications


 Medications
   Dose
          Sig/Gracia
       Start Time
   Status  Last


 (Trade)       Ordered        Route
 PRN     Stop Time              Admin
Dose


                              Reason                                Admin


 Ondansetron    4 mg           BRIDGE ORDER   6/28/19                



HCl
  (Zofran                 PRN
 IV
       05:00



Inj)                          NAUSEA/VOMITI  6/29/19 04:59


                              NG


 Morphine       4 mg           ONCE  STAT
    6/28/19       DC           6/28/19


Sulfate
                      IV
            05:00
                       05:23



(morphine)                                   6/28/19 05:01


 Ondansetron    4 mg           ONCE  STAT
    6/28/19       DC           6/28/19


HCl
  (Zofran                 IV
            05:00
                       05:24



Inj)                                         6/28/19 05:01


 Carvedilol
    3.125 mg       BID
 PO
       6/28/19                



(Coreg)                                      09:00



                10 mg          DAILY
 PO
     6/28/19                



Empaglifozin
                                09:00



 (Jardiance)


 Ondansetron    4 mg           DAILY
 PO
     6/28/19                



HCl
  (Zofran                                09:00



Tab)


 Diagnostic     1 ea           AC MEALS AND   6/28/19                



Test
  (Pha)
                  BEDTIME
 XX
  07:00



 (Accu-Chek)


 Potassium
     1,000 ml @ 
   Q10H
 IV
      6/28/19                



Chloride/Sodi  100 mls/hr                    05:11



um
 Chloride


 IV Flush
      3 ml           PER            6/28/19                



(NS 3 ml)                     PROTOCOL
 IV
  05:30



 Morphine       2 mg           Q4H  PRN
      6/28/19                



Sulfate
                      IV
 .SEVERE    05:30



(morphine)                    PAIN 7-10


 Docusate       100 mg         Q12H  PRN
     6/28/19                



Sodium
                       PO
            05:30



(Colace)                      .CONSTIPATION


 Famotidine
    20 mg          Q12
 PO
       6/28/19                



(Pepcid)                                     09:00



 Enoxaparin     40 mg          DAILY
 SC
     6/28/19                



Sodium
                                      09:00



(Lovenox)








Procedures/MDM


MDM





The patient's presentation warrants further investigation. Previous medical 


records, if available, were reviewed.





LABS





The patient's laboratory testing was obtained and reviewed. No emergent 


treatment was required unless described below.





CBC:   Severe leukopenia and neutropenia, concerning for significant 


immunosuppression.  Normocytic anemia, not emergent.  No E/o thrombocytopenia


Chemistry:   No E/o severe alkalosis or renal failure or liver disease or 


diabetic ketoacidosis.  Hypokalemia, not emergent.  Mild anion gap metabolic 


acidosis, not emergent.  New transaminitis.


Lipase:   No E/o pancreatitis


Urine:   Nephropathy evident.  No E/o acute infection or hematuria





EKG





EKG read by me: 


Rate/Rhythm:    Regular rate and rhythm at a rate of 73 bpm


Intervals:    Normal


Axis:    Normal


Impression:    No evidence of acute ischemia or arrhythmia





IMAGING





Imaging and Radiology interpretation reviewed.





1V Interpreted by me


Soft Tissue: No acute abnormalities


Bones: No acute abnormalities


Mediastinum/Cardiac Silhouette: Unremarkable.  No widened mediastinum.


Lungs: No acute abnormalities.  Normal pulmonary vasculature.  No pneumothorax. 


No pulmonary edema.  Clear costal diaphragmatic angles.  No pleural effusions.  


No opacity or consolidations concerning for pneumonia.





Ultrasound right upper quadrant


FINDINGS: There is sludge in multiple mobile gallstones present. The gallbladder


wall is mildly thickened maximal transverse diameter 3.41 mm. Mild 


pericholecystic fluid. Rule out acute calculus cholecystitis. Common bile duct 


normal limits in size maximal transverse diameter 5.15 mm. No evidence of 


intrahepatic biliary ductal dilation. The pancreas was poorly visualized due to 


overlying bowel gas however those portion the pancreatic head and proximal body 


visualized are unremarkable. The liver is normal limits in size maximal sagittal


dimension 15.03 cm. Heterogeneous fatty infiltration of the liver without focal 


hepatic lesions. Right kidney normal in size maximal sagittal dimension 8.98 cm.


Normal right renal parenchymal echogenicity without hydronephrosis intra renal 


mass or calculus.


IMPRESSION: 


1.  Sludge in multiple mobile gallstones with mild gallbladder wall thickening 


and pericholecystic fluid. Rule out acute calculus cholecystitis.


2.  No biliary ductal dilation.


3.  Poor visualization of the pancreas which is otherwise unremarkable.


4.  Hepatic fatty infiltration without focal hepatic lesions.


5.  Unremarkable right kidney.


Electronically viewed and signed by Physician Rosalinda on 06/28/2019 05:28 





TREATMENT/DISPOSITION





The patient presents for right-sided rib pain and right upper quadrant abdominal


pain.  The patient's symptoms could be related to chemotherapy and exacerbated 


pain from her regimen.  Metastatic disease to the bone should also be 


considered. That said, there is evidence of a possible hepatic pathology given 


her transaminitis.  Right upper quadrant ultrasound was completed that revealed 


the possibility of acute cholecystitis.  The patient will be given a dose of 


Zosyn in the emergency department.  General surgery will be consulted.  After 


speaking with the on-call general surgeon, Dr. Purdy, he recommended that a 


HIDA scan be completed.  This was ordered for assessment in the hospital. The 


patient's work-up reveals severe neutropenia, and I am concerned about her 


immunodeficient state.  I did speak with the patient's oncologist, Dr. José Pal, who recommended that the patient be admitted for Neupogen therapy. His


partner, Dr. Summers, has privileges at our facility and will require 


consultation. I suspect the patient's neutropenia to be related to her 


chemotherapy.  The patient is afebrile with unremarkable vital signs.  I do not 


believe the patient is systemically ill or septic.  I do not feel the patient 


requires a full septic work-up.





The patient does not have any evidence of peritonitis. The patient does not have


clinical symptoms concerning for mesenteric ischemia or ischemic colitis.  I do 


not suspect viscus perforation.  The patient does not have left upper quadrant 


tenderness.  I have low suspicion for pancreatitis. The patient does not have 


any right lower quadrant tenderness, or periumbilical tenderness. I have low 


suspicion for appendicitis. The patient does not have suprapubic tenderness. I 


have decreased suspicion for cystitis.   The patient does not have any left 


lower quadrant tenderness, and I have low suspicion for diverticulosis or 


diverticulitis. The patient does not have any flank tenderness.  The patient 


does not have gross hematuria.  I have decreased suspicion for nephrolithiasis 


or renal colic.  The patient does not have any palpable pulsatile mass or severe


abdominal pain radiating to the back. I have low suspicion for aortic aneurysm, 


dissection or rupture.





The patient's chest xray does not reveal pneumonia or pneumothorax or pleural 


effusions or pulmonary edema. The patient does not have a widened mediastinum 


and does not have signs or symptoms concerning for thoracic aortic aneurysm or 


dissection. The patient does not have pneumomediastinum or signs concerning for 


esophageal tear or rupture. The patient has no clinical or radiographic signs of


pericardial effusion or tamponade.  The patient does not have pneumoperitoneum 


and I have decreased suspicion of viscus perforation as possible referred pain. 


The patient does not have a history of heart failure and I have low suspicion 


for this. The patient does not have a diagnosis of COPD and is not wheezing 


today. The patient is not tachypneic or hypoxic. The patient is breathing 


comfortably and without pleuritic pain. The patient is not on hormonal therapy. 


The patient has no history of clotting or bleeding disorders. The patient has no


calf tenderness. The patient has had no hemoptysis.  I have decreased suspicion 


for PE. The patient's EKG is reassuring. I have low suspicion for acute coronary


syndrome. 





ADMISSION





At this time, I feel that the patient requires admission for further evaluation 


and management. The patient will be admitted to [facility] in accordance with the 


patient's insurance.  The patient was accepted by Dr. Russell at 5 AM on June 28, 2019.





Dr. Summers, Dr. Pal's partner, will require consultation in the hospital.





Dr. Purdy, the on-call general surgeon, was consulted in the ER.





Disclaimer: Inadvertent spelling and grammatical errors are likely due to 


EHR/dictation software use and do not reflect on the overall quality of patient 


care. Note that the electronic time recorded on this note does not necessarily 


reflect the actual time of the patient encounter.





Departure


Diagnosis:  


   Primary Impression:  


   Cholecystitis


   Additional Impressions:  


   Right upper quadrant abdominal pain


   Rib pain on right side


   History of ovarian cancer


   Neutropenia


   Neutropenia type:  unspecified  Qualified Codes:  D70.9 - Neutropenia, 


   unspecified


   Normocytic anemia


   Hypokalemia


   High anion gap metabolic acidosis


   Transaminitis


Condition:  Serious











ROSAURA FAY MD              Jun 28, 2019 05:04

## 2019-06-28 NOTE — HP
DATE OF ADMISSION: 06/28/2019

 

CHIEF COMPLAINT:  Right upper quadrant abdominal pain.

 

HISTORY OF PRESENT ILLNESS:  A 59-year-old female with history of recurrent ovarian cancer status pos
t debulking aiming for followup and followed by chemotherapy as outpatient, presented to [facility] with complaint of right-sided upper abdominal pain radiating to her back for 1-1/2 day prior to ad
mission.  Symptoms are worse with movement and persisted at rest.  There was no history of trauma.  T
he patient reports mild nausea but no vomiting.  At the time of my visit she denying any abdominal pa
in.  Initial evaluation revealed white blood cell count of 0.9 and hemoglobin of 9.6.  The patient un
derwent abdominal ultrasound.  This showed sludge and multiple gallstones with mild gallbladder wall 
thickening and pericholecystic fluid.  The symptoms were consistent with acute cholecystitis.  Liver 
function tests were also elevated with AST of 262, ALT of 115 and alkaline phosphatase of 169.  Total
 bilirubin was normal at 0.6.  Troponin was normal.  Last chemotherapy was the Wednesday prior to adm
ission.

 

PAST MEDICAL HISTORY:

1.  Recurrent ovarian cancer.

2.  Hypertension.

3.  Type 2 diabetes mellitus.

 

PAST SURGICAL HISTORY:  Status cold debulking for recurrent ovarian cancer.

 

MEDICATIONS PRIOR TO ADMISSION:

1.  Coreg 3.125 mg b.i.d.

2.  Omeprazole 40 mg daily.

3.  Jardiance 10 mg daily.

 

SOCIAL HISTORY:  Patient lives at home.  Her daughter was at the bedside.  She denies tobacco or alco
hol use.

 

PHYSICAL EXAMINATION:

GENERAL:  Well-developed, well-nourished female with alopecia.

VITAL SIGNS:  Stable.  She is afebrile.

HEENT:  Extraocular muscles intact.  Pupils are equal and reactive to light bilaterally.  Sclerae are
 anicteric.  Oropharynx is clear and moist.

NECK:  Supple, no JVD, no carotid bruits.

LUNGS:  Clear to auscultation bilaterally.

CARDIAC:  Regular rate and rhythm.  No murmurs, rubs or gallops.

ABDOMEN:  Soft, mild right upper quadrant and right flank tenderness to palpation.  No rebound or gua
rding.  Normoactive bowel sounds.

EXTREMITIES:  No clubbing, cyanosis, or edema.

NEUROLOGICAL:  Grossly nonfocal.

 

ASSESSMENT:

1.  A 59-year-old female with acute cholecystitis versus biliary colic.  Case was discussed with Dr. Arroyo.  He recommended HIDA scan.  If there is evidence of acute cholecystitis, he then recommends c
holecystostomy tube placement.  Surgical intervention is avoided due to her underlying cancer and jama
tropenia.

2.  Recurrent ovarian cancer, undergoing chemotherapy.

3.  Neutropenia.

4.  Hypertension.

5.  Type 2 diabetes mellitus.

 

PLAN:

1.  Admit to med/surg, proceed with a HIDA scan and continue Zosyn.

2.  N.p.o.

3.  Surgical followup is appreciated.

 

 

Dictated By: JOY RAMIREZ MD

 

SK/NTS

DD:    06/28/2019 11:26:58

DT:    06/28/2019 13:49:54

Conf#: [id number]

DID#:  [id number]

CC: JESSICA ARROYO MD;*EndCC*

## 2019-06-29 VITALS — SYSTOLIC BLOOD PRESSURE: 109 MMHG | RESPIRATION RATE: 18 BRPM | HEART RATE: 79 BPM | DIASTOLIC BLOOD PRESSURE: 61 MMHG

## 2019-06-29 VITALS — SYSTOLIC BLOOD PRESSURE: 103 MMHG | DIASTOLIC BLOOD PRESSURE: 54 MMHG | HEART RATE: 59 BPM | RESPIRATION RATE: 18 BRPM

## 2019-06-29 VITALS — DIASTOLIC BLOOD PRESSURE: 58 MMHG | HEART RATE: 63 BPM | SYSTOLIC BLOOD PRESSURE: 107 MMHG

## 2019-06-29 VITALS — HEART RATE: 76 BPM | RESPIRATION RATE: 18 BRPM | SYSTOLIC BLOOD PRESSURE: 124 MMHG | DIASTOLIC BLOOD PRESSURE: 54 MMHG

## 2019-06-29 VITALS — DIASTOLIC BLOOD PRESSURE: 59 MMHG | RESPIRATION RATE: 17 BRPM | SYSTOLIC BLOOD PRESSURE: 104 MMHG | HEART RATE: 74 BPM

## 2019-06-29 LAB
ABNORMAL IP MESSAGE: 1
ADD MAN DIFF?: YES
ALANINE AMINOTRANSFERASE: 247 IU/L (ref 13–69)
ALBUMIN: 3.2 G/DL (ref 3.3–4.9)
ALKALINE PHOSPHATASE: 157 IU/L (ref 42–121)
ANION GAP: 9 (ref 5–13)
ANISOCYTOSIS: (no result) (ref 0–0)
ASPARTATE AMINO TRANSFERASE: 127 IU/L (ref 15–46)
BAND NEUTROPHILS #M: 0 10^3/UL (ref 0–0.6)
BAND NEUTROPHILS % (M): 4 % (ref 0–4)
BILIRUBIN,DIRECT: 0 MG/DL (ref 0–0.2)
BILIRUBIN,TOTAL: 0.6 MG/DL (ref 0.2–1.3)
BLOOD UREA NITROGEN: 9 MG/DL (ref 7–20)
CALCIUM: 7.1 MG/DL (ref 8.4–10.2)
CARBON DIOXIDE: 25 MMOL/L (ref 21–31)
CHLORIDE: 108 MMOL/L (ref 97–110)
CREATININE: 0.85 MG/DL (ref 0.44–1)
ERYTHROBLAST% (NRBC) (M): 1 % (ref 0–0)
GIANT THROMBO% (M): 12 % (ref 0–0)
GLUCOSE: 80 MG/DL (ref 70–220)
HEMATOCRIT: 26.7 % (ref 37–47)
HEMOGLOBIN A1C: 5.5 % (ref 0–5.9)
HEMOGLOBIN: 8.6 G/DL (ref 12–16)
HEPATITIS B CORE ANTIBODY: NEGATIVE
HEPATITIS C VIRAL ANTIBODY: NEGATIVE
IMMATURE GRANS #M: 0 10^3/UL (ref 0–0.03)
IMMATURE GRANS % (M): 0 % (ref 0–0.43)
LYMPHOCYTES #M: 0.6 10^3/UL (ref 0.8–2.9)
LYMPHOCYTES % (M): 68 % (ref 15–51)
MEAN CORPUSCULAR HEMOGLOBIN: 27.2 PG (ref 29–33)
MEAN CORPUSCULAR HGB CONC: 32.2 G/DL (ref 32–37)
MEAN CORPUSCULAR VOLUME: 84.5 FL (ref 82–101)
MEAN PLATELET VOLUME: 12.1 FL (ref 7.4–10.4)
MICROCYTOSIS: (no result) (ref 0–0)
MONOCYTE #M: 0.2 10^3/UL (ref 0.3–0.9)
MONOCYTES % (M): 24 % (ref 0–11)
MYELOCYTES #M: 0 10^3/UL (ref 0–0)
MYELOCYTES % (M): 1 % (ref 0–0)
NUCLEATED RED BLOOD CELLS%: 0 /100WBC (ref 0–0)
OVALOCYTES: (no result) (ref 0–0)
PLATELET COUNT: 111 10^3/UL (ref 140–415)
PLATELET ESTIMATE: (no result)
POLYCHROMASIA: (no result) (ref 0–0)
POSITIVE DIFF: (no result)
POTASSIUM: 4.1 MMOL/L (ref 3.5–5.1)
RED BLOOD COUNT: 3.16 10^6/UL (ref 4.2–5.4)
RED CELL DISTRIBUTION WIDTH: 17.7 % (ref 11.5–14.5)
SEG NEUT #M: 0 10^3/UL (ref 1.6–7.5)
SEGMENTED NEUTROPHILS (M) %: 3 % (ref 39–77)
SMUDGE%M: 5 % (ref 0–0)
SODIUM: 142 MMOL/L (ref 135–144)
THYROID STIMULATING HORMONE: 0.35 MIU/L (ref 0.47–4.68)
TOTAL PROTEIN: 6.4 G/DL (ref 6.1–8.1)
WHITE BLOOD COUNT: 1 10^3/UL (ref 4.8–10.8)

## 2019-06-29 RX ADMIN — FAMOTIDINE SCH MG: 20 TABLET ORAL at 20:46

## 2019-06-29 RX ADMIN — SODIUM CHLORIDE AND POTASSIUM CHLORIDE 1 MLS/HR: .9; .15 SOLUTION INTRAVENOUS at 11:32

## 2019-06-29 RX ADMIN — SODIUM CHLORIDE AND POTASSIUM CHLORIDE 1 MLS/HR: .9; .15 SOLUTION INTRAVENOUS at 20:46

## 2019-06-29 RX ADMIN — EMPAGLIFLOZIN SCH MG: 10 TABLET, FILM COATED ORAL at 08:05

## 2019-06-29 RX ADMIN — ENOXAPARIN SODIUM 1 MG: 100 INJECTION SUBCUTANEOUS at 08:06

## 2019-06-29 RX ADMIN — FAMOTIDINE 1 MG: 20 TABLET ORAL at 20:46

## 2019-06-29 RX ADMIN — PIPERACILLIN SODIUM AND TAZOBACTAM SODIUM SCH MLS/HR: 3; .375 INJECTION, POWDER, LYOPHILIZED, FOR SOLUTION INTRAVENOUS at 05:49

## 2019-06-29 RX ADMIN — SODIUM CHLORIDE AND POTASSIUM CHLORIDE SCH MLS/HR: .9; .15 SOLUTION INTRAVENOUS at 20:46

## 2019-06-29 RX ADMIN — ONDANSETRON HYDROCHLORIDE 1 MG: 4 TABLET, FILM COATED ORAL at 08:05

## 2019-06-29 RX ADMIN — PIPERACILLIN SODIUM AND TAZOBACTAM SODIUM 1 MLS/HR: 3; .375 INJECTION, POWDER, LYOPHILIZED, FOR SOLUTION INTRAVENOUS at 14:39

## 2019-06-29 RX ADMIN — PIPERACILLIN SODIUM AND TAZOBACTAM SODIUM 1 MLS/HR: 3; .375 INJECTION, POWDER, LYOPHILIZED, FOR SOLUTION INTRAVENOUS at 05:49

## 2019-06-29 RX ADMIN — PIPERACILLIN SODIUM AND TAZOBACTAM SODIUM SCH MLS/HR: 3; .375 INJECTION, POWDER, LYOPHILIZED, FOR SOLUTION INTRAVENOUS at 14:39

## 2019-06-29 RX ADMIN — FILGRASTIM-AAFI SCH MCG: 480 INJECTION, SOLUTION SUBCUTANEOUS at 17:59

## 2019-06-29 RX ADMIN — PIPERACILLIN SODIUM AND TAZOBACTAM SODIUM 1 MLS/HR: 3; .375 INJECTION, POWDER, LYOPHILIZED, FOR SOLUTION INTRAVENOUS at 20:46

## 2019-06-29 RX ADMIN — SODIUM CHLORIDE AND POTASSIUM CHLORIDE SCH MLS/HR: .9; .15 SOLUTION INTRAVENOUS at 11:32

## 2019-06-29 RX ADMIN — FILGRASTIM-AAFI 1 MCG: 480 INJECTION, SOLUTION SUBCUTANEOUS at 17:59

## 2019-06-29 RX ADMIN — SODIUM CHLORIDE AND POTASSIUM CHLORIDE 1 MLS/HR: .9; .15 SOLUTION INTRAVENOUS at 00:04

## 2019-06-29 RX ADMIN — ENOXAPARIN SODIUM SCH MG: 100 INJECTION SUBCUTANEOUS at 08:06

## 2019-06-29 RX ADMIN — SODIUM CHLORIDE AND POTASSIUM CHLORIDE SCH MLS/HR: .9; .15 SOLUTION INTRAVENOUS at 00:04

## 2019-06-29 RX ADMIN — FAMOTIDINE SCH MG: 20 TABLET ORAL at 08:05

## 2019-06-29 RX ADMIN — ONDANSETRON SCH MG: 4 TABLET, FILM COATED ORAL at 08:05

## 2019-06-29 RX ADMIN — EMPAGLIFLOZIN 1 MG: 10 TABLET, FILM COATED ORAL at 08:05

## 2019-06-29 RX ADMIN — FAMOTIDINE 1 MG: 20 TABLET ORAL at 08:05

## 2019-06-29 RX ADMIN — PIPERACILLIN SODIUM AND TAZOBACTAM SODIUM SCH MLS/HR: 3; .375 INJECTION, POWDER, LYOPHILIZED, FOR SOLUTION INTRAVENOUS at 20:46

## 2019-06-29 NOTE — CONS
Assessment/Plan


Assessment/Plan


Assessment/Plan (Daily)


#1 Suspected acute cholcystitis


#2 Severe neutropenia secondary to chemotherapy


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 


right ovary


#4 Status post pelvic Mass, small roamn segment and appendiceal resection for 


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 The patient is actually on her firs 


cycle of adjuvant chemotherapy with Carboplatin and Taxotere.





Recommendations


Agree with IV antibiotics


ID evaluation


Neupogen 480 mcg s/c until ANC is >1000


Daily CBC X3


Surgery to evaluate, but i agree that if surgery can be post pone it is best to 


continue with close observation





Consultation Date/Type/Reason


Admit Date/Time


Jun 28, 2019 at 05:00


Date of Consultation:  Jun 29, 2019


Type of Consult


Oncology


Reason for Consultation


Clear cell carcinoma of a pelvic mass


Requesting Provider:  ERICK REA MD


Date/Time of Note


DATE: 6/29/19 


TIME: 15:03





Hx of Present Illness


This is a 58 year old woman who underwent a TAHBSO in 2011 for a Grade I Well 


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 


atypical complexed endometrial hyperplasia. The pathology report was 


Endometrioid borderline tumor, approaching well differentiated ademocarcinoma in


a bckground of endometriosis. There was no malignancy identified in: Lymph 


nodes, gutters, bladder, omentum, sigmoid and/or peritoneum. Since this was an 


ovarian tumor of borderline malignancy, she did not receive any adjuvant 


treatment.She did well until April 2018, whe a pelvic mass was detected by 


abdominopelvic CT scan, when she was hospitalized complaining of suprapubic 


pain. She underwent a resection of the pelvic mass an adherent small bowel 


segment, and appendix  in March 2019. The pathology report was Adenicarcinoma 


with Clear cell features. The tumor was presenting extensive necrosis and it was


involving the surgical margins of resection.There was no evidence of malignancy 


in the small bowel segment.and/or appendix. The patient was started on Adjuvant 


chemotherapy with Carboplatin and Taxotere for Clear cell carcinoma. She has 


received one cycle and is now admitted for suspected acute cholecystitis. She is


presenting severe neutropenia and was started on Neupogen in order to stimulate 


granuloipoiesis.





Past Medical History


Medical History:  other (Ovarian carcinoma)


Home Meds


Reported Medications


Ondansetron Hcl* (Ondansetron Hcl*) 4 Mg Tablet, 4 MG PO DAILY for 30 Days


   6/28/19


Empagliflozin (Jardiance) 10 Mg Tablet, 10 MG PO DAILY, TAB


   12/29/18


Omeprazole* (Omeprazole*) 40 Mg Capsule.dr, 40 MG PO DAILY, #30 CAP


   12/29/18


Carvedilol* (Carvedilol*) 3.125 Mg Tablet, 3.125 MG PO BID, #60 TAB


   12/29/18


Discontinued Reported Medications


Ergocalciferol (Vitamin D2) (VITAMIN D2) 50,000 Unit Capsule, 51104 UNIT PO Q7D 


PRN for QWED, CAP


   4/18/19


Discontinued Scripts


Docusate Sodium* (Colace*) 100 Mg Capsule, 100 MG PO BID, #60 CAP


   Prov:JOY RAMIREZ MD         4/19/19


Hydrocodone/Acetaminophen (Norco 5-325 Tablet) 1 Each Tablet, 1 EACH PO Q4, #20 


TAB


   Prov:JOY RAMIREZ MD         3/1/19


Medications





Current Medications


Carvedilol (Coreg) 3.125 mg BID PO  Last administered on 6/29/19at 09:33; Admin 


Dose 3.125 MG;  Start 6/28/19 at 09:00


Empaglifozin (Jardiance) 10 mg DAILY PO  Last administered on 6/29/19at 08:05; 


Admin Dose 10 MG;  Start 6/28/19 at 09:00


Ondansetron HCl (Zofran Tab) 4 mg DAILY PO  Last administered on 6/29/19at 


08:05; Admin Dose 4 MG;  Start 6/28/19 at 09:00


Diagnostic Test (Pha) (Accu-Chek) 1 ea AC MEALS AND  BEDTIME XX ;  Start 6/28/19


at 07:00


Potassium Chloride/Sodium Chloride 1,000 ml @  100 mls/hr Q10H IV  Last 


administered on 6/29/19at 11:32; Admin Dose 100 MLS/HR;  Start 6/28/19 at 05:11


IV Flush (NS 3 ml) 3 ml PER PROTOCOL IV ;  Start 6/28/19 at 05:30


Morphine Sulfate (morphine) 2 mg Q4H  PRN IV .SEVERE PAIN 7-10 Last administered


on 6/28/19at 22:46; Admin Dose 2 MG;  Start 6/28/19 at 05:30


Docusate Sodium (Colace) 100 mg Q12H  PRN PO .CONSTIPATION;  Start 6/28/19 at 


05:30


Famotidine (Pepcid) 20 mg Q12 PO  Last administered on 6/29/19at 08:05; Admin 


Dose 20 MG;  Start 6/28/19 at 09:00


Enoxaparin Sodium (Lovenox) 40 mg DAILY SC  Last administered on 6/29/19at 


08:06; Admin Dose 40 MG;  Start 6/28/19 at 09:00


Piperacillin Sod/ Tazobactam Sod 100 ml @  200 mls/hr Q8 IVPB  Last administered


on 6/29/19at 14:39; Admin Dose 200 MLS/HR;  Start 6/28/19 at 14:00


Filgrastim (Nivestym) 480 mcg DAILY@1700 SC  Last administered on 6/28/19at 


18:28; Admin Dose 480 MCG;  Start 6/28/19 at 17:00


Allergies:  


Coded Allergies:  


     acetaminophen (Unverified  Allergy, Unknown, ITCHING, 4/18/19)


     hydrocodone (Unverified  Allergy, Unknown, ITCHING, 4/18/19)





Past Surgical History


Past Surgical Hx:  bowel resection, other (Resection of pelvic mass)





Social History


Smoking Status:  Never smoker





Exam/Review of Systems


Exam


Vitals





Vital Signs


  Date      Temp  Pulse  Resp  B/P (MAP)   Pulse Ox  O2          O2 Flow    FiO2


Time                                                 Delivery    Rate


   6/29/19  97.8     63            107/58        98


     09:31                           (74)


   6/29/19                 18


     08:00


   6/28/19                                           Room Air


     14:00








Intake and Output





6/28/19 6/28/19 6/29/19





1515:00


23:00


07:00





IntakeIntake Total


200 ml


100 ml


1600 ml





BalanceBalance


200 ml


100 ml


1600 ml











Constitutional:  alert


Psych:  anxiety


Eyes:  nl conjunctiva, EOMI, nl lids, nl sclera, PERRL


ENMT:  nl external ears & nose, nl lips & teeth, nl nasal mucosa & septum


Respiratory:  other (Clear to auscultation)


Cardiovascular:  other (Regular rhythm)


Gastrointestinal:  other (mild tenderness in the epigastrium. No reboud. The 


abdomen is soft and tender to palpation.)


Musculoskeletal:  other (No abnormalities)


Skin:  other (not examed)


Lymph:  nontender





Results


Result Diagram:  


6/29/19 0536                                                                    


           6/29/19 0536





Results 24hrs





Laboratory Tests


Test
                 6/28/19
17:20  6/28/19
20:29  6/29/19
05:36  6/29/19
08:01


Bedside Glucose                92             83                            84


White Blood Count                                          1.0  L


Red Blood Count                                           3.16  L


Hemoglobin                                                 8.6  L


Hematocrit                                                26.7  L


Mean Corpuscular                                           84.5


Volume


Mean Corpuscular                                          27.2  L


Hemoglobin


Mean Corpuscular      
              
                    32.2  
  



Hemoglobin
Concent


Red Cell                                                  17.7  H


Distribution Width


Platelet Count                                            111  #L


Mean Platelet Volume                                      12.1  H


Immature                                                 0.000  L


Granulocytes %


Neutrophils %


Segmented             
              
                      3  L
  



Neutrophils


%
(Manual)


Band Neutrophils %                                            4


(Manual)


Lymphocytes %


Lymphocytes %                                               68  H


(Manual)


Monocytes %


Monocytes % (Manual)                                        24  H


Eosinophils %


Basophils %


Myelocytes %                                                 1  H


(Manual)


Nucleated Red Blood                                          1  H


Cells %


Immature                                                  0.000


Granulocytes #


Neutrophils #


Neutrophils #                                              0.0  L


(Manual)


Band Neutrophils #                                          0.0


Lymphocytes (Manual)                                       0.6  L


Lymphocytes #


Monocytes #


Monocytes # (Manual)                                       0.2  L


Eosinophils #


Basophils #


Myelocytes #                                                0.0


Nucleated Red Blood


Cells #


Platelet Estimate                                   DECREASED


Giant Platelets                                             12  H


Polychromasia                                                1+


Anisocytosis                                                 1+


Microcytosis                                                 1+


Ovalocytes                                                   1+


Sodium Level                                                142


Potassium Level                                             4.1


Chloride Level                                              108


Carbon Dioxide Level                                         25


Anion Gap                                                    9  #


Blood Urea Nitrogen                                           9


Creatinine                                                 0.85


Est Glomerular        
              
              > 60  
        



Filtrat Rate
mL/min


Glucose Level                                               80  #


Hemoglobin A1c                                              5.5


Calcium Level                                              7.1  L


Total Bilirubin                                             0.6


Direct Bilirubin                                           0.00


Indirect Bilirubin                                          0.6


Aspartate Amino       
              
                    127  H
  



Transf
(AST/SGOT)


Alanine               
              
                    247  H
  



Aminotransferase
(AL


T/SGPT)


Alkaline Phosphatase                                       157  H


Total Protein                                              6.4  #


Albumin                                                    3.2  L


Thyroid Stimulating   
              
                  0.352  L
  



Hormone
(TSH)


Test
                 6/29/19
11:36  
              
              



Bedside Glucose                77








Medications


Medication





Current Medications


Carvedilol (Coreg) 3.125 mg BID PO  Last administered on 6/29/19at 09:33; Admin 


Dose 3.125 MG;  Start 6/28/19 at 09:00


Empaglifozin (Jardiance) 10 mg DAILY PO  Last administered on 6/29/19at 08:05; 


Admin Dose 10 MG;  Start 6/28/19 at 09:00


Ondansetron HCl (Zofran Tab) 4 mg DAILY PO  Last administered on 6/29/19at 


08:05; Admin Dose 4 MG;  Start 6/28/19 at 09:00


Diagnostic Test (Pha) (Accu-Chek) 1 ea AC MEALS AND  BEDTIME XX ;  Start 6/28/19


at 07:00


Potassium Chloride/Sodium Chloride 1,000 ml @  100 mls/hr Q10H IV  Last 


administered on 6/29/19at 11:32; Admin Dose 100 MLS/HR;  Start 6/28/19 at 05:11


IV Flush (NS 3 ml) 3 ml PER PROTOCOL IV ;  Start 6/28/19 at 05:30


Morphine Sulfate (morphine) 2 mg Q4H  PRN IV .SEVERE PAIN 7-10 Last administered


on 6/28/19at 22:46; Admin Dose 2 MG;  Start 6/28/19 at 05:30


Docusate Sodium (Colace) 100 mg Q12H  PRN PO .CONSTIPATION;  Start 6/28/19 at 


05:30


Famotidine (Pepcid) 20 mg Q12 PO  Last administered on 6/29/19at 08:05; Admin 


Dose 20 MG;  Start 6/28/19 at 09:00


Enoxaparin Sodium (Lovenox) 40 mg DAILY SC  Last administered on 6/29/19at 


08:06; Admin Dose 40 MG;  Start 6/28/19 at 09:00


Piperacillin Sod/ Tazobactam Sod 100 ml @  200 mls/hr Q8 IVPB  Last administered


on 6/29/19at 14:39; Admin Dose 200 MLS/HR;  Start 6/28/19 at 14:00


Filgrastim (Nivestym) 480 mcg DAILY@1700 SC  Last administered on 6/28/19at 


18:28; Admin Dose 480 MCG;  Start 6/28/19 at 17:00











BRET MATA MD             Jun 29, 2019 15:35

## 2019-06-29 NOTE — PN
Date/Time of Note


Date/Time of Note


DATE: 6/29/19 


TIME: 11:27





Subjective


Doing well.  Denies abdominal pain.  Has mild nausea but no vomiting





Objective


Vitals





Vital Signs


  Date      Temp  Pulse  Resp  B/P (MAP)   Pulse Ox  O2          O2 Flow    FiO2


Time                                                 Delivery    Rate


   6/29/19  97.8     63            107/58        98


     09:31                           (74)


   6/29/19                 18


     08:00


   6/28/19                                           Room Air


     14:00








Intake and Output





6/28/19 6/28/19 6/29/19





1515:00


23:00


07:00





IntakeIntake Total


200 ml


100 ml


1600 ml





BalanceBalance


200 ml


100 ml


1600 ml











Clear to auscultation bilaterally


Regular rate and rhythm


Right upper quadrant tenderness to palpation.  No rebound no guarding.  


Normoactive bowel sounds


No edema


Nonfocal





Results


Result Diagram:  


6/29/19 0536 6/29/19 0536








Medications


Medications





Current Medications


Carvedilol (Coreg) 3.125 mg BID PO  Last administered on 6/29/19at 09:33; Admin 


Dose 3.125 MG;  Start 6/28/19 at 09:00


Empaglifozin (Jardiance) 10 mg DAILY PO  Last administered on 6/29/19at 08:05; 


Admin Dose 10 MG;  Start 6/28/19 at 09:00


Ondansetron HCl (Zofran Tab) 4 mg DAILY PO  Last administered on 6/29/19at 


08:05; Admin Dose 4 MG;  Start 6/28/19 at 09:00


Diagnostic Test (Pha) (Accu-Chek) 1 ea AC MEALS AND  BEDTIME XX ;  Start 6/28/19


at 07:00


Potassium Chloride/Sodium Chloride 1,000 ml @  100 mls/hr Q10H IV  Last a


dministered on 6/29/19at 00:04; Admin Dose 100 MLS/HR;  Start 6/28/19 at 05:11


IV Flush (NS 3 ml) 3 ml PER PROTOCOL IV ;  Start 6/28/19 at 05:30


Morphine Sulfate (morphine) 2 mg Q4H  PRN IV .SEVERE PAIN 7-10 Last administered


on 6/28/19at 22:46; Admin Dose 2 MG;  Start 6/28/19 at 05:30


Docusate Sodium (Colace) 100 mg Q12H  PRN PO .CONSTIPATION;  Start 6/28/19 at 


05:30


Famotidine (Pepcid) 20 mg Q12 PO  Last administered on 6/29/19at 08:05; Admin 


Dose 20 MG;  Start 6/28/19 at 09:00


Enoxaparin Sodium (Lovenox) 40 mg DAILY SC  Last administered on 6/29/19at 


08:06; Admin Dose 40 MG;  Start 6/28/19 at 09:00


Piperacillin Sod/ Tazobactam Sod 100 ml @  200 mls/hr Q8 IVPB  Last administered


on 6/29/19at 05:49; Admin Dose 200 MLS/HR;  Start 6/28/19 at 14:00


Filgrastim (Nivestym) 480 mcg DAILY@1700 SC  Last administered on 6/28/19at 


18:28; Admin Dose 480 MCG;  Start 6/28/19 at 17:00





VTE Prophylaxis


Risk score (from Ns)>0 risk:  4


SCD applied (from [facility]):  No


SCD contraindication:  low risk/ambulating





Lines/Catheters


IV Catheter Type:  Saline Lock


Tidwell in Place:  No





Assessment/Plan


Assessment/Plan


59-year-old female with acute cholecystitis, HIDA is pending


Recurrent ovarian cancer, undergoing chemotherapy


Neutropenia


Anemia of chronic disease





Complete HIDA scan today


Continue Zosyn


Continue Neupogen


Surgical, oncology, and ID follow-up


Monitor CBC











JOY RAMIREZ MD                  Jun 29, 2019 11:29

## 2019-06-29 NOTE — CONS
Assessment/Plan


Assessment/Plan


Hospital Course (Demo Recall)


assessment/impression


- possibly complicated intra-abdominal infection: no clear e/o acute cho


lecystitis on HIDA but Pt's symptoms (post-prandial RUQ pain and nausea) are 


concerning for cholecystitis


- sludge in multiple mobile gallstones with mild gallbladder wall thickening and


pericholecystic fluid on abd US


- HIDA scan showed no scintigraphic evidence of a cystic duct obstruction.  


There was nonvisualization of the gastrointestinal uptake up to 90 minutes post 


injection


- sudden onset HA and posterior neck pain, associated with blurry vision x 3 


days


- severe neutropenia, WBC 0.9 and ANC 45 on admission


- recurrent clear cell ovarian CA, was started on carboplatin and taxotere as o


utpatient. She finished the first cycle approximately one week prior to her ER 


presentation.  


- h/o MIKEL-BSO in 2011 for ovarian tumor of borderline malignancy


- h/o ex lap, extensive enterolysis, small bowel resection, resection of pelvic 


masses/tumor on 3/4/2019. Path report from this surgery showed clear cell CA 


with extensive tumor necrosis.


- h/o cystoscopy and insertion of bilateral ureteral JJ stents to alleviate b/l 


hydronephrosis due to ureteral obstruction by pelvic tumor on 3/4/2019.


- GUTIERREZ


- transaminitis





recommendations


- ordered blood cultures in AM


- will review the final results of HIDA


- continue pip/tazo (6/28/2019-)


- if her HA persists or worsens, I recommend head CT


management d/w Pt, her son





Consultation Date/Type/Reason


Admit Date/Time


Jun 28, 2019 at 05:00


Date of Consultation:  Jun 29, 2019


Type of Consult


ID


Reason for Consultation


complicated intra-abdominal infection/suspected acute cholecystitis in the 


setting of severe neutropenia


Requesting Provider:  JOY RAMIREZ MD


Date/Time of Note


DATE: 6/29/19 


TIME: 20:04





Hx of Present Illness


This is a 58 yo female with recurrent clear cell ovarian CA who presented at ER 


on 6/28/2019 for progressively worsening RUQ pain and HA.  She has h/o MIKEL-BSO 


in 2011 for ovarian tumor of borderline malignancy.  In 4/2018 a pelvic mass was


identified.  On 3/4/2019 Pt underwent ex lap, extensive enterolysis, small bowel


resection, resection of pelvic masses/tumor.  On the same day she had cystoscopy


and insertion of bilateral ureteral JJ stents to alleviate b/l hydronephrosis 


due to ureteral obstruction by pelvic tumor.  Path report from this surgery gogo


wed clear cell CA with extensive tumor necrosis. Pt was started on carboplatin 


and taxotere.  She finished the first cycle approximately one week prior to her 


ER presentation.  After that she started experiencing RUQ pain, and occasional 


nausea.  She denies fever, diarrhea, dysuria.  3 days prior to presentation she 


developed HA, located on the posterior head and neck.  It started suddenly and 


is associated with b/l blurry vision.  The abdominal pain remains the same, 


while HA improved over time.  At present, her pain is rated 5 on the 1-10 scale.


 When she presented at ER on 6/28/2019, she was afebrile but her WBC was 0.9 


where ANC 45.  Abd JOSE showed sludge in multiple mobile gallstones with mild 


gallbladder wall thickening and pericholecystic fluid. Pt was started on 


pip/tazo.  Her HIDA scan showe no scintigraphic evidence of a cystic duct 


obstruction.  There was nonvisualization of the gastrointestinal uptake up to 90


minutes post injection. Dr. Ramirez requested ID consultation on this Pt.


Constitutional:  no complaints


Eyes:  other (blurry vision)


ENT:  no complaints


Respiratory:  no complaints


Cardiovascular:  no complaints


Gastrointestinal:  pain, nausea, passing stool; 


   No diarrhea, No vomiting


Genitourinary:  no complaints


Musculoskeletal:  no complaints


Skin:  no complaints


Neurologic:  headache (plus posterior neck pain, as started in HPI)





Past Medical History


Medical History:  gallstones, hepatitis (GUTIERREZ), other (Ovarian carcinoma)


Home Meds


Reported Medications


Ondansetron Hcl* (Ondansetron Hcl*) 4 Mg Tablet, 4 MG PO DAILY for 30 Days


   6/28/19


Empagliflozin (Jardiance) 10 Mg Tablet, 10 MG PO DAILY, TAB


   12/29/18


Omeprazole* (Omeprazole*) 40 Mg Capsule.dr, 40 MG PO DAILY, #30 CAP


   12/29/18


Carvedilol* (Carvedilol*) 3.125 Mg Tablet, 3.125 MG PO BID, #60 TAB


   12/29/18


Discontinued Reported Medications


Ergocalciferol (Vitamin D2) (VITAMIN D2) 50,000 Unit Capsule, 43772 UNIT PO Q7D 


PRN for QWED, CAP


   4/18/19


Discontinued Scripts


Docusate Sodium* (Colace*) 100 Mg Capsule, 100 MG PO BID, #60 CAP


   Prov:JOY RAMIREZ MD         4/19/19


Hydrocodone/Acetaminophen (Norco 5-325 Tablet) 1 Each Tablet, 1 EACH PO Q4, #20 


TAB


   Prov:JOY RAMIREZ MD         3/1/19


Medications





Current Medications


Carvedilol (Coreg) 3.125 mg BID PO  Last administered on 6/29/19at 09:33; Admin 


Dose 3.125 MG;  Start 6/28/19 at 09:00


Empaglifozin (Jardiance) 10 mg DAILY PO  Last administered on 6/29/19at 08:05; 


Admin Dose 10 MG;  Start 6/28/19 at 09:00


Ondansetron HCl (Zofran Tab) 4 mg DAILY PO  Last administered on 6/29/19at 


08:05; Admin Dose 4 MG;  Start 6/28/19 at 09:00


Diagnostic Test (Pha) (Accu-Chek) 1 ea AC MEALS AND  BEDTIME XX ;  Start 6/28/19


at 07:00


Potassium Chloride/Sodium Chloride 1,000 ml @  100 mls/hr Q10H IV  Last 


administered on 6/29/19at 11:32; Admin Dose 100 MLS/HR;  Start 6/28/19 at 05:11


IV Flush (NS 3 ml) 3 ml PER PROTOCOL IV ;  Start 6/28/19 at 05:30


Morphine Sulfate (morphine) 2 mg Q4H  PRN IV .SEVERE PAIN 7-10 Last administered


on 6/28/19at 22:46; Admin Dose 2 MG;  Start 6/28/19 at 05:30


Docusate Sodium (Colace) 100 mg Q12H  PRN PO .CONSTIPATION;  Start 6/28/19 at 


05:30


Famotidine (Pepcid) 20 mg Q12 PO  Last administered on 6/29/19at 08:05; Admin 


Dose 20 MG;  Start 6/28/19 at 09:00


Enoxaparin Sodium (Lovenox) 40 mg DAILY SC  Last administered on 6/29/19at 


08:06; Admin Dose 40 MG;  Start 6/28/19 at 09:00


Piperacillin Sod/ Tazobactam Sod 100 ml @  200 mls/hr Q8 IVPB  Last administered


on 6/29/19at 14:39; Admin Dose 200 MLS/HR;  Start 6/28/19 at 14:00


Filgrastim (Nivestym) 480 mcg DAILY@1700 SC  Last administered on 6/29/19at 


17:59; Admin Dose 480 MCG;  Start 6/28/19 at 17:00


Allergies:  


Coded Allergies:  


     acetaminophen (Unverified  Allergy, Unknown, ITCHING, 4/18/19)


     hydrocodone (Unverified  Allergy, Unknown, ITCHING, 4/18/19)





Past Surgical History


Past Surgical Hx:  bowel resection, endoscopy (cystoscopy to place a ureteral JJ


stents), other (Resection of pelvic mass)





Social History


Alcohol Use:  none


Smoking Status:  Never smoker





Exam/Review of Systems


Exam


Vitals





Vital Signs


  Date      Temp  Pulse  Resp  B/P (MAP)   Pulse Ox  O2          O2 Flow    FiO2


Time                                                 Delivery    Rate


   6/29/19  97.9     79    18      109/61        97


     15:39                           (77)


   6/28/19                                           Room Air


     14:00








Intake and Output





6/28/19 6/28/19 6/29/19





1515:00


23:00


07:00





IntakeIntake Total


200 ml


100 ml


1600 ml





BalanceBalance


200 ml


100 ml


1600 ml











Constitutional:  alert, oriented, well developed


Psych:  no complaints, nl mood/affect


Head:  normocephalic, atraumatic, other (mild alopecia, non-reproducible 


tenderness of posterior scalp)


Eyes:  nl conjunctiva, nl lids, nl sclera


ENMT:  nl external ears & nose, nl nasal mucosa & septum, mucosa pink and moist


Neck:  supple, non-tender; 


   No masses


Respiratory:  clear to auscultation, normal air movement


Cardiovascular:  regular rate and rhythm, nl pulses; 


   No edema


Gastrointestinal:  soft, bowel sounds, tender (+Singer's sign); 


   No distended, No rebound or guarding


Musculoskeletal:  nl extremities to inspection


Extremities:  No edema


Neurological:  CNS II-XII intact, nl mental status, nl speech


Skin:  nl turgor; 


   No rash or lesions





Results


Result Diagram:  


6/29/19 0536                                                                    


           6/29/19 0536





Results 24hrs





Laboratory Tests


Test
                 6/28/19
20:29  6/29/19
05:36  6/29/19
08:01  6/29/19
11:36


Bedside Glucose                83                            84             77


White Blood Count                           1.0  L


Red Blood Count                            3.16  L


Hemoglobin                                  8.6  L


Hematocrit                                 26.7  L


Mean Corpuscular                            84.5


Volume


Mean Corpuscular                           27.2  L


Hemoglobin


Mean Corpuscular      
                    32.2  
  
              



Hemoglobin
Concent


Red Cell                                   17.7  H


Distribution Width


Platelet Count                             111  #L


Mean Platelet Volume                       12.1  H


Immature                                  0.000  L


Granulocytes %


Neutrophils %


Segmented             
                      3  L
  
              



Neutrophils


%
(Manual)


Band Neutrophils %                             4


(Manual)


Lymphocytes %


Lymphocytes %                                68  H


(Manual)


Monocytes %


Monocytes % (Manual)                         24  H


Eosinophils %


Basophils %


Myelocytes %                                  1  H


(Manual)


Nucleated Red Blood                           1  H


Cells %


Immature                                   0.000


Granulocytes #


Neutrophils #


Neutrophils #                               0.0  L


(Manual)


Band Neutrophils #                           0.0


Lymphocytes (Manual)                        0.6  L


Lymphocytes #


Monocytes #


Monocytes # (Manual)                        0.2  L


Eosinophils #


Basophils #


Myelocytes #                                 0.0


Nucleated Red Blood


Cells #


Platelet Estimate                    DECREASED


Giant Platelets                              12  H


Polychromasia                                 1+


Anisocytosis                                  1+


Microcytosis                                  1+


Ovalocytes                                    1+


Sodium Level                                 142


Potassium Level                              4.1


Chloride Level                               108


Carbon Dioxide Level                          25


Anion Gap                                     9  #


Blood Urea Nitrogen                            9


Creatinine                                  0.85


Est Glomerular        
              > 60  
        
              



Filtrat Rate
mL/min


Glucose Level                                80  #


Hemoglobin A1c                               5.5


Calcium Level                               7.1  L


Total Bilirubin                              0.6


Direct Bilirubin                            0.00


Indirect Bilirubin                           0.6


Aspartate Amino       
                    127  H
  
              



Transf
(AST/SGOT)


Alanine               
                    247  H
  
              



Aminotransferase
(AL


T/SGPT)


Alkaline Phosphatase                        157  H


Total Protein                               6.4  #


Albumin                                     3.2  L


Thyroid Stimulating   
                  0.352  L
  
              



Hormone
(TSH)


Test
                 6/29/19
17:27  
              
              



Bedside Glucose                76








Medications


Medication





Current Medications


Carvedilol (Coreg) 3.125 mg BID PO  Last administered on 6/29/19at 09:33; Admin 


Dose 3.125 MG;  Start 6/28/19 at 09:00


Empaglifozin (Jardiance) 10 mg DAILY PO  Last administered on 6/29/19at 08:05; 


Admin Dose 10 MG;  Start 6/28/19 at 09:00


Ondansetron HCl (Zofran Tab) 4 mg DAILY PO  Last administered on 6/29/19at 


08:05; Admin Dose 4 MG;  Start 6/28/19 at 09:00


Diagnostic Test (Pha) (Accu-Chek) 1 ea AC MEALS AND  BEDTIME XX ;  Start 6/28/19


at 07:00


Potassium Chloride/Sodium Chloride 1,000 ml @  100 mls/hr Q10H IV  Last 


administered on 6/29/19at 11:32; Admin Dose 100 MLS/HR;  Start 6/28/19 at 05:11


IV Flush (NS 3 ml) 3 ml PER PROTOCOL IV ;  Start 6/28/19 at 05:30


Morphine Sulfate (morphine) 2 mg Q4H  PRN IV .SEVERE PAIN 7-10 Last administered


on 6/28/19at 22:46; Admin Dose 2 MG;  Start 6/28/19 at 05:30


Docusate Sodium (Colace) 100 mg Q12H  PRN PO .CONSTIPATION;  Start 6/28/19 at 0


5:30


Famotidine (Pepcid) 20 mg Q12 PO  Last administered on 6/29/19at 08:05; Admin 


Dose 20 MG;  Start 6/28/19 at 09:00


Enoxaparin Sodium (Lovenox) 40 mg DAILY SC  Last administered on 6/29/19at 


08:06; Admin Dose 40 MG;  Start 6/28/19 at 09:00


Piperacillin Sod/ Tazobactam Sod 100 ml @  200 mls/hr Q8 IVPB  Last administered


on 6/29/19at 14:39; Admin Dose 200 MLS/HR;  Start 6/28/19 at 14:00


Filgrastim (Nivestym) 480 mcg DAILY@1700 SC  Last administered on 6/29/19at 


17:59; Admin Dose 480 MCG;  Start 6/28/19 at 17:00











ROSALIE PLASCENCIA M.D.             Jun 29, 2019 20:15

## 2019-06-29 NOTE — QN
Documentation


Comment


Patient is afebrile and has very little abdominal pain


She is tolerating a soft diet


HIDA scan results are still pending











JESSICA ARROYO MD             Jun 29, 2019 13:12

## 2019-06-30 VITALS — SYSTOLIC BLOOD PRESSURE: 97 MMHG | RESPIRATION RATE: 18 BRPM | DIASTOLIC BLOOD PRESSURE: 51 MMHG | HEART RATE: 70 BPM

## 2019-06-30 VITALS — RESPIRATION RATE: 16 BRPM | HEART RATE: 67 BPM | DIASTOLIC BLOOD PRESSURE: 59 MMHG | SYSTOLIC BLOOD PRESSURE: 124 MMHG

## 2019-06-30 LAB
ABNORMAL IP MESSAGE: 1
ADD MAN DIFF?: YES
ANISOCYTOSIS: (no result) (ref 0–0)
BAND NEUTROPHILS #M: 0.7 10^3/UL (ref 0–0.6)
BAND NEUTROPHILS % (M): 31 % (ref 0–4)
BASOPHIL #M: 0 10^3/UL (ref 0–0)
BASOPHILS % (M): 1 % (ref 0–2)
DOHLE BODIES: (no result) (ref 0–0)
ERYTHROBLAST% (NRBC) (M): 5 % (ref 0–0)
GIANT THROMBO% (M): 1 % (ref 0–0)
HEMATOCRIT: 27.3 % (ref 37–47)
HEMOGLOBIN: 8.7 G/DL (ref 12–16)
IMMATURE GRANS #M: 0.02 10^3/UL (ref 0–0.03)
IMMATURE GRANS % (M): 0.9 % (ref 0–0.43)
LYMPHOCYTES #M: 0.8 10^3/UL (ref 0.8–2.9)
LYMPHOCYTES % (M): 37 % (ref 15–51)
MEAN CORPUSCULAR HEMOGLOBIN: 27 PG (ref 29–33)
MEAN CORPUSCULAR HGB CONC: 31.9 G/DL (ref 32–37)
MEAN CORPUSCULAR VOLUME: 84.8 FL (ref 82–101)
MEAN PLATELET VOLUME: 11.9 FL (ref 7.4–10.4)
MICROCYTOSIS: (no result) (ref 0–0)
MONOCYTE #M: 0.3 10^3/UL (ref 0.3–0.9)
MONOCYTES % (M): 15 % (ref 0–11)
MYELOCYTES #M: 0 10^3/UL (ref 0–0)
MYELOCYTES % (M): 1 % (ref 0–0)
NUCLEATED RED BLOOD CELLS%: 1.7 /100WBC (ref 0–0)
PLATELET COUNT: 75 10^3/UL (ref 140–415)
PLATELET ESTIMATE: (no result)
POLYCHROMASIA: (no result) (ref 0–0)
POSITIVE DIFF: (no result)
RED BLOOD COUNT: 3.22 10^6/UL (ref 4.2–5.4)
RED CELL DISTRIBUTION WIDTH: 17.6 % (ref 11.5–14.5)
SEG NEUT #M: 0.4 10^3/UL (ref 1.6–7.5)
SEGMENTED NEUTROPHILS (M) %: 15 % (ref 39–77)
SMUDGE%M: 2 % (ref 0–0)
TOXIC GRANULATION: (no result) (ref 0–0)
WHITE BLOOD COUNT: 2.3 10^3/UL (ref 4.8–10.8)

## 2019-06-30 RX ADMIN — ENOXAPARIN SODIUM 1 MG: 100 INJECTION SUBCUTANEOUS at 08:52

## 2019-06-30 RX ADMIN — SODIUM CHLORIDE AND POTASSIUM CHLORIDE SCH MLS/HR: .9; .15 SOLUTION INTRAVENOUS at 05:26

## 2019-06-30 RX ADMIN — MORPHINE SULFATE PRN MG: 2 INJECTION, SOLUTION INTRAMUSCULAR; INTRAVENOUS at 07:32

## 2019-06-30 RX ADMIN — PIPERACILLIN SODIUM AND TAZOBACTAM SODIUM 1 MLS/HR: 3; .375 INJECTION, POWDER, LYOPHILIZED, FOR SOLUTION INTRAVENOUS at 05:26

## 2019-06-30 RX ADMIN — SODIUM CHLORIDE AND POTASSIUM CHLORIDE 1 MLS/HR: .9; .15 SOLUTION INTRAVENOUS at 05:26

## 2019-06-30 RX ADMIN — PIPERACILLIN SODIUM AND TAZOBACTAM SODIUM SCH MLS/HR: 3; .375 INJECTION, POWDER, LYOPHILIZED, FOR SOLUTION INTRAVENOUS at 05:26

## 2019-06-30 RX ADMIN — EMPAGLIFLOZIN SCH MG: 10 TABLET, FILM COATED ORAL at 08:41

## 2019-06-30 RX ADMIN — MORPHINE SULFATE 1 MG: 2 INJECTION, SOLUTION INTRAMUSCULAR; INTRAVENOUS at 07:32

## 2019-06-30 RX ADMIN — EMPAGLIFLOZIN 1 MG: 10 TABLET, FILM COATED ORAL at 08:41

## 2019-06-30 RX ADMIN — ONDANSETRON SCH MG: 4 TABLET, FILM COATED ORAL at 08:41

## 2019-06-30 RX ADMIN — FAMOTIDINE SCH MG: 20 TABLET ORAL at 08:41

## 2019-06-30 RX ADMIN — FAMOTIDINE 1 MG: 20 TABLET ORAL at 08:41

## 2019-06-30 RX ADMIN — ENOXAPARIN SODIUM SCH MG: 100 INJECTION SUBCUTANEOUS at 08:52

## 2019-06-30 RX ADMIN — ONDANSETRON HYDROCHLORIDE 1 MG: 4 TABLET, FILM COATED ORAL at 08:41

## 2019-06-30 NOTE — PDOCDIS
Discharge Instructions


CONDITION


                 [id number]
Patient Condition:  [id number]
Good








HOME CARE INSTRUCTIONS:


               [id number]
Diet Instructions:  [id number]
       [id number]
Activity Restrictions:  [id number]
No Restrictions








FOLLOW UP/APPOINTMENTS


Follow-up Plan


pcp 1 week





Dr Lerma 3 days











JOY RAMIREZ MD                  Jun 30, 2019 09:53

## 2019-06-30 NOTE — QN
Documentation


Comment


Patient is asymptomatic


HIDA scan shows no evidence of cystic duct obstruction


Abdominal examination is benign


Cleared for discharge home today











JESSICA ARROYO MD             Jun 30, 2019 10:23

## 2019-07-01 NOTE — DS
DATE OF ADMISSION: 06/29/2019

DATE OF DISCHARGE: 06/30/2019

 

DISCHARGE DIAGNOSES:

1.  Acute cholecystitis.

2.  Recurrent ovarian cancer, undergoing chemotherapy.

3.  Neutropenia, resolved.

4.  Hypertension.

5.  Type 2 diabetes mellitus.

 

PROCEDURES DURING HOSPITALIZATION:  Abdominal ultrasound and HIDA scan.

 

HOSPITAL COURSE:  A 59-year-old female with recurrent ovarian cancer status post debulking several mo
nths prior to admission, followed by chemotherapy, presented to emergency room with complaint of righ
t upper quadrant abdominal pain associated with nausea.  An ultrasound of the abdomen showed sludge a
nd multiple mobile gallstones with mild gallbladder wall thickening and pericholecystic fluid.  Surgi
noel consultation was requested.  Dr. Arroyo did not recommend surgery due to patient's underlying med
ical problems and neutropenia.  He ordered a HIDA scan.

 

Her symptoms improved during the hospitalization with IV antibiotics.  HIDA scan showed no evidence o
f cystic duct obstruction.  Initial white blood cell count was as low as 0.9.  Oncology and ID consul
tations were requested.  Dr. Gonzalez recommended continuing Zosyn.  Her white blood cell count incre
ased to 2.3 on the day of discharge.

 

The patient is in stable condition for discharge to home, i.e., prescribed 4 more days of Neupogen as
 well as Augmentin.  She was asked to advance her diet as tolerated.

 

DISCHARGE MEDICATIONS:

1.  Augmentin 875/125 p.o. b.i.d. x5 days.

2.  Neupogen 480 mcg subcutaneous daily x4 days.

3.  Norco 5/325 one tablet every 4 hours as needed.

4.  Carvedilol 3.125 mg b.i.d. 

5.  Jardiance 10 mg daily.

6.  Omeprazole 40 mg daily.

7.  Zofran as needed.

 

FOLLOWUP:

1.  Home health.

2.  Home health nurse was ordered.  

3.  Follow up with PCP and oncology as outpatient.

 

 

Dictated By: JOY PARISI/CHONG

DD:    06/30/2019 09:59:50

DT:    06/30/2019 20:29:41

Conf#: [id number]

DID#:  [id number]

CC: ROSALIE GONZALEZ MD; JESSICA ARROYO MD; LIZBETH HERNANDEZ MD;*End*

## 2019-07-10 ENCOUNTER — HOSPITAL ENCOUNTER (OUTPATIENT)
Dept: HOSPITAL 10 - E/R | Age: 59
Setting detail: OBSERVATION
LOS: 1 days | Discharge: HOME | End: 2019-07-11
Attending: PEDIATRICS | Admitting: PEDIATRICS
Payer: COMMERCIAL

## 2019-07-10 ENCOUNTER — HOSPITAL ENCOUNTER (OUTPATIENT)
Dept: HOSPITAL 91 - MS1 | Age: 59
Setting detail: OBSERVATION
LOS: 1 days | Discharge: HOME | End: 2019-07-11
Payer: COMMERCIAL

## 2019-07-10 VITALS
HEIGHT: 62 IN | WEIGHT: 123.9 LBS | BODY MASS INDEX: 22.8 KG/M2 | HEIGHT: 62 IN | BODY MASS INDEX: 22.8 KG/M2 | WEIGHT: 123.9 LBS

## 2019-07-10 DIAGNOSIS — E11.9: ICD-10-CM

## 2019-07-10 DIAGNOSIS — C56.9: ICD-10-CM

## 2019-07-10 DIAGNOSIS — R11.2: ICD-10-CM

## 2019-07-10 DIAGNOSIS — K21.9: ICD-10-CM

## 2019-07-10 DIAGNOSIS — R10.13: Primary | ICD-10-CM

## 2019-07-10 DIAGNOSIS — I10: ICD-10-CM

## 2019-07-10 LAB
ABNORMAL IP MESSAGE: 1
ADD MAN DIFF?: YES
ADD UMIC: NO
ALANINE AMINOTRANSFERASE: 92 IU/L (ref 13–69)
ALBUMIN/GLOBULIN RATIO: 1.1
ALBUMIN: 4.1 G/DL (ref 3.3–4.9)
ALKALINE PHOSPHATASE: 135 IU/L (ref 42–121)
ANION GAP: 13 (ref 5–13)
ANISOCYTOSIS: (no result) (ref 0–0)
ASPARTATE AMINO TRANSFERASE: 62 IU/L (ref 15–46)
BAND NEUTROPHILS #M: 0 10^3/UL (ref 0–0.6)
BAND NEUTROPHILS % (M): 2 % (ref 0–4)
BASOPHIL #M: 0 10^3/UL (ref 0–0)
BASOPHILS % (M): 2 % (ref 0–2)
BILIRUBIN,DIRECT: 0 MG/DL (ref 0–0.2)
BILIRUBIN,TOTAL: 0.4 MG/DL (ref 0.2–1.3)
BLOOD UREA NITROGEN: 14 MG/DL (ref 7–20)
CALCIUM: 7.8 MG/DL (ref 8.4–10.2)
CARBON DIOXIDE: 20 MMOL/L (ref 21–31)
CHLORIDE: 111 MMOL/L (ref 97–110)
CREATININE: 0.76 MG/DL (ref 0.44–1)
GIANT THROMBO% (M): 1 % (ref 0–0)
GLOBULIN: 3.7 G/DL (ref 1.3–3.2)
GLUCOSE: 111 MG/DL (ref 70–220)
HEMATOCRIT: 34.2 % (ref 37–47)
HEMOGLOBIN: 10.8 G/DL (ref 12–16)
IMMATURE GRANS #M: 0.02 10^3/UL (ref 0–0.03)
IMMATURE GRANS % (M): 0.5 % (ref 0–0.43)
LIPASE: 45 U/L (ref 23–300)
LYMPHOCYTES #M: 0.1 10^3/UL (ref 0.8–2.9)
LYMPHOCYTES % (M): 3 % (ref 15–51)
MEAN CORPUSCULAR HEMOGLOBIN: 27.1 PG (ref 29–33)
MEAN CORPUSCULAR HGB CONC: 31.6 G/DL (ref 32–37)
MEAN CORPUSCULAR VOLUME: 85.7 FL (ref 82–101)
MEAN PLATELET VOLUME: 11.3 FL (ref 7.4–10.4)
MICROCYTOSIS: (no result) (ref 0–0)
MONOCYTE #M: 0.1 10^3/UL (ref 0.3–0.9)
MONOCYTES % (M): 3 % (ref 0–11)
NUCLEATED RED BLOOD CELLS%: 0 /100WBC (ref 0–0)
PLATELET COUNT: 161 10^3/UL (ref 140–415)
POIKILOCYTOSIS: (no result) (ref 0–0)
POSITIVE DIFF: (no result)
POTASSIUM: 4.1 MMOL/L (ref 3.5–5.1)
RED BLOOD COUNT: 3.99 10^6/UL (ref 4.2–5.4)
RED CELL DISTRIBUTION WIDTH: 19.3 % (ref 11.5–14.5)
SEG NEUT #M: 3.7 10^3/UL (ref 1.6–7.5)
SEGMENTED NEUTROPHILS (M) %: 90 % (ref 39–77)
SMUDGE%M: 8 % (ref 0–0)
SODIUM: 144 MMOL/L (ref 135–144)
TOTAL PROTEIN: 7.8 G/DL (ref 6.1–8.1)
UR ASCORBIC ACID: NEGATIVE MG/DL
UR BILIRUBIN (DIP): NEGATIVE MG/DL
UR BLOOD (DIP): NEGATIVE MG/DL
UR CLARITY: (no result)
UR COLOR: YELLOW
UR GLUCOSE (DIP): (no result) MG/DL
UR KETONES (DIP): NEGATIVE MG/DL
UR LEUKOCYTE ESTERASE (DIP): NEGATIVE LEU/UL
UR MUCUS: (no result) /HPF
UR NITRITE (DIP): NEGATIVE MG/DL
UR PH (DIP): 5 (ref 5–9)
UR RBC: 1 /HPF (ref 0–5)
UR SPECIFIC GRAVITY (DIP): 1.01 (ref 1–1.03)
UR SQUAMOUS EPITHELIAL CELL: (no result) /HPF
UR TOTAL PROTEIN (DIP): NEGATIVE MG/DL
UR UROBILINOGEN (DIP): NEGATIVE MG/DL
UR WBC: 4 /HPF (ref 0–5)
WHITE BLOOD COUNT: 4.1 10^3/UL (ref 4.8–10.8)

## 2019-07-10 PROCEDURE — 80048 BASIC METABOLIC PNL TOTAL CA: CPT

## 2019-07-10 PROCEDURE — 99285 EMERGENCY DEPT VISIT HI MDM: CPT

## 2019-07-10 PROCEDURE — 85610 PROTHROMBIN TIME: CPT

## 2019-07-10 PROCEDURE — 85730 THROMBOPLASTIN TIME PARTIAL: CPT

## 2019-07-10 PROCEDURE — 83036 HEMOGLOBIN GLYCOSYLATED A1C: CPT

## 2019-07-10 PROCEDURE — 81001 URINALYSIS AUTO W/SCOPE: CPT

## 2019-07-10 PROCEDURE — G0378 HOSPITAL OBSERVATION PER HR: HCPCS

## 2019-07-10 PROCEDURE — 78227 HEPATOBIL SYST IMAGE W/DRUG: CPT

## 2019-07-10 PROCEDURE — 80053 COMPREHEN METABOLIC PANEL: CPT

## 2019-07-10 PROCEDURE — 83690 ASSAY OF LIPASE: CPT

## 2019-07-10 PROCEDURE — 76705 ECHO EXAM OF ABDOMEN: CPT

## 2019-07-10 PROCEDURE — 85025 COMPLETE CBC W/AUTO DIFF WBC: CPT

## 2019-07-10 PROCEDURE — 82962 GLUCOSE BLOOD TEST: CPT

## 2019-07-10 PROCEDURE — 36415 COLL VENOUS BLD VENIPUNCTURE: CPT

## 2019-07-10 PROCEDURE — 81003 URINALYSIS AUTO W/O SCOPE: CPT

## 2019-07-10 PROCEDURE — 84484 ASSAY OF TROPONIN QUANT: CPT

## 2019-07-10 PROCEDURE — 96375 TX/PRO/DX INJ NEW DRUG ADDON: CPT

## 2019-07-10 PROCEDURE — 96374 THER/PROPH/DIAG INJ IV PUSH: CPT

## 2019-07-10 PROCEDURE — A9537 TC99M MEBROFENIN: HCPCS

## 2019-07-10 RX ADMIN — HYDROMORPHONE HYDROCHLORIDE 1 MG: 1 INJECTION, SOLUTION INTRAMUSCULAR; INTRAVENOUS; SUBCUTANEOUS at 23:46

## 2019-07-10 RX ADMIN — ONDANSETRON HYDROCHLORIDE 1 MG: 2 INJECTION, SOLUTION INTRAMUSCULAR; INTRAVENOUS at 23:46

## 2019-07-10 RX ADMIN — THIAMINE HYDROCHLORIDE 1 MLS/HR: 100 INJECTION, SOLUTION INTRAMUSCULAR; INTRAVENOUS at 22:06

## 2019-07-10 RX ADMIN — ONDANSETRON HYDROCHLORIDE 1 MG: 2 INJECTION, SOLUTION INTRAMUSCULAR; INTRAVENOUS at 22:06

## 2019-07-11 VITALS — RESPIRATION RATE: 17 BRPM | SYSTOLIC BLOOD PRESSURE: 112 MMHG | DIASTOLIC BLOOD PRESSURE: 59 MMHG | HEART RATE: 72 BPM

## 2019-07-11 VITALS — RESPIRATION RATE: 18 BRPM | SYSTOLIC BLOOD PRESSURE: 161 MMHG | HEART RATE: 95 BPM | DIASTOLIC BLOOD PRESSURE: 74 MMHG

## 2019-07-11 LAB
ABNORMAL IP MESSAGE: 1
ADD MAN DIFF?: NO
ANION GAP: 10 (ref 5–13)
BASOPHIL #: 0 10^3/UL (ref 0–0.1)
BASOPHILS %: 0.5 % (ref 0–2)
BLOOD UREA NITROGEN: 14 MG/DL (ref 7–20)
CALCIUM: 6.9 MG/DL (ref 8.4–10.2)
CARBON DIOXIDE: 21 MMOL/L (ref 21–31)
CHLORIDE: 112 MMOL/L (ref 97–110)
CREATININE: 0.64 MG/DL (ref 0.44–1)
EOSINOPHILS #: 0 10^3/UL (ref 0–0.5)
EOSINOPHILS %: 0 % (ref 0–7)
GLUCOSE: 92 MG/DL (ref 70–220)
HEMATOCRIT: 27 % (ref 37–47)
HEMOGLOBIN A1C: 5.5 % (ref 0–5.9)
HEMOGLOBIN: 8.8 G/DL (ref 12–16)
IMMATURE GRANS #M: 0.03 10^3/UL (ref 0–0.03)
IMMATURE GRANS % (M): 0.7 % (ref 0–0.43)
INR: 1.01
LYMPHOCYTES #: 0.4 10^3/UL (ref 0.8–2.9)
LYMPHOCYTES %: 9.7 % (ref 15–51)
MEAN CORPUSCULAR HEMOGLOBIN: 27.7 PG (ref 29–33)
MEAN CORPUSCULAR HGB CONC: 32.6 G/DL (ref 32–37)
MEAN CORPUSCULAR VOLUME: 84.9 FL (ref 82–101)
MEAN PLATELET VOLUME: 11.3 FL (ref 7.4–10.4)
MONOCYTE #: 0.3 10^3/UL (ref 0.3–0.9)
MONOCYTES %: 5.9 % (ref 0–11)
NEUTROPHIL #: 3.5 10^3/UL (ref 1.6–7.5)
NEUTROPHILS %: 83.2 % (ref 39–77)
NUCLEATED RED BLOOD CELLS #: 0 10^3/UL (ref 0–0)
NUCLEATED RED BLOOD CELLS%: 0 /100WBC (ref 0–0)
PARTIAL THROMBOPLASTIN TIME: 25.5 SEC (ref 23–35)
PLATELET COUNT: 160 10^3/UL (ref 140–415)
POSITIVE DIFF: (no result)
POTASSIUM: 3.9 MMOL/L (ref 3.5–5.1)
PROTIME: 13.4 SEC (ref 11.9–14.9)
PT RATIO: 1
RED BLOOD COUNT: 3.18 10^6/UL (ref 4.2–5.4)
RED CELL DISTRIBUTION WIDTH: 19 % (ref 11.5–14.5)
SODIUM: 143 MMOL/L (ref 135–144)
TROPONIN-I: < 0.012 NG/ML (ref 0–0.12)
WHITE BLOOD COUNT: 4.2 10^3/UL (ref 4.8–10.8)

## 2019-07-11 RX ADMIN — DEXAMETHASONE SODIUM PHOSPHATE PRN MG: 10 INJECTION, SOLUTION INTRAMUSCULAR; INTRAVENOUS at 12:24

## 2019-07-11 RX ADMIN — DIPHENHYDRAMINE HYDROCHLORIDE SCH MG: 50 INJECTION, SOLUTION INTRAMUSCULAR; INTRAVENOUS at 09:33

## 2019-07-11 RX ADMIN — HEPARIN SODIUM 1 UNIT: 5000 INJECTION, SOLUTION INTRAVENOUS; SUBCUTANEOUS at 05:19

## 2019-07-11 RX ADMIN — HEPARIN SODIUM SCH UNIT: 5000 INJECTION, SOLUTION INTRAVENOUS; SUBCUTANEOUS at 15:32

## 2019-07-11 RX ADMIN — HYDROMORPHONE HYDROCHLORIDE PRN MG: 1 INJECTION, SOLUTION INTRAMUSCULAR; INTRAVENOUS; SUBCUTANEOUS at 03:53

## 2019-07-11 RX ADMIN — HEPARIN SODIUM 1 UNIT: 5000 INJECTION, SOLUTION INTRAVENOUS; SUBCUTANEOUS at 15:32

## 2019-07-11 RX ADMIN — FAMOTIDINE 1 MG: 10 INJECTION, SOLUTION INTRAVENOUS at 09:33

## 2019-07-11 RX ADMIN — FAMOTIDINE 1 MG: 10 INJECTION, SOLUTION INTRAVENOUS at 01:45

## 2019-07-11 RX ADMIN — THIAMINE HYDROCHLORIDE 1 MLS/HR: 100 INJECTION, SOLUTION INTRAMUSCULAR; INTRAVENOUS at 01:52

## 2019-07-11 RX ADMIN — HEPARIN SODIUM 1 UNIT: 5000 INJECTION, SOLUTION INTRAVENOUS; SUBCUTANEOUS at 01:54

## 2019-07-11 RX ADMIN — FOLIC ACID SCH MLS/HR: 5 INJECTION, SOLUTION INTRAMUSCULAR; INTRAVENOUS; SUBCUTANEOUS at 01:52

## 2019-07-11 RX ADMIN — ONDANSETRON HYDROCHLORIDE 1 MG: 4 TABLET, FILM COATED ORAL at 09:33

## 2019-07-11 RX ADMIN — HYDROMORPHONE HYDROCHLORIDE 1 MG: 1 INJECTION, SOLUTION INTRAMUSCULAR; INTRAVENOUS; SUBCUTANEOUS at 12:19

## 2019-07-11 RX ADMIN — HEPARIN SODIUM SCH UNIT: 5000 INJECTION, SOLUTION INTRAVENOUS; SUBCUTANEOUS at 05:19

## 2019-07-11 RX ADMIN — ONDANSETRON HYDROCHLORIDE 1 MG: 2 INJECTION, SOLUTION INTRAMUSCULAR; INTRAVENOUS at 12:24

## 2019-07-11 RX ADMIN — HYDROMORPHONE HYDROCHLORIDE PRN MG: 1 INJECTION, SOLUTION INTRAMUSCULAR; INTRAVENOUS; SUBCUTANEOUS at 15:30

## 2019-07-11 RX ADMIN — HYDROMORPHONE HYDROCHLORIDE 1 MG: 1 INJECTION, SOLUTION INTRAMUSCULAR; INTRAVENOUS; SUBCUTANEOUS at 03:53

## 2019-07-11 RX ADMIN — HYDROMORPHONE HYDROCHLORIDE PRN MG: 1 INJECTION, SOLUTION INTRAMUSCULAR; INTRAVENOUS; SUBCUTANEOUS at 12:19

## 2019-07-11 RX ADMIN — THIAMINE HYDROCHLORIDE 1 MLS/HR: 100 INJECTION, SOLUTION INTRAMUSCULAR; INTRAVENOUS at 14:33

## 2019-07-11 RX ADMIN — FOLIC ACID SCH MLS/HR: 5 INJECTION, SOLUTION INTRAMUSCULAR; INTRAVENOUS; SUBCUTANEOUS at 14:33

## 2019-07-11 RX ADMIN — HYDROMORPHONE HYDROCHLORIDE 1 MG: 1 INJECTION, SOLUTION INTRAMUSCULAR; INTRAVENOUS; SUBCUTANEOUS at 15:30

## 2019-07-11 RX ADMIN — HEPARIN SODIUM SCH UNIT: 5000 INJECTION, SOLUTION INTRAVENOUS; SUBCUTANEOUS at 01:54

## 2019-07-11 RX ADMIN — ONDANSETRON HYDROCHLORIDE 1 MG: 2 INJECTION, SOLUTION INTRAMUSCULAR; INTRAVENOUS at 03:52

## 2019-07-11 RX ADMIN — DEXAMETHASONE SODIUM PHOSPHATE PRN MG: 10 INJECTION, SOLUTION INTRAMUSCULAR; INTRAVENOUS at 03:52

## 2019-07-11 RX ADMIN — DIPHENHYDRAMINE HYDROCHLORIDE SCH MG: 50 INJECTION, SOLUTION INTRAMUSCULAR; INTRAVENOUS at 01:45

## 2019-07-11 NOTE — CONS
Assessment/Plan


Assessment/Plan


Assessment/Plan (Daily)


Patient may have suffered an episode of biliary colic but certainly is now 


asymptomatic with a benign abdomen


Repeat HIDA scan has been ordered.  Further recommendations will depend on the 


results of the HIDA scan and patient's clinical course.  I will follow with you.





Consultation Date/Type/Reason


Admit Date/Time


Jul 10, 2019 at 23:43


Initial Consult Date


June 28, 2019


Type of Consult


General surgery


Reason for Consultation


Gallstones and right upper quadrant abdominal pain


Date/Time of Note


DATE: 7/11/19 


TIME: 09:21





24 HR Interval Summary


Free Text/Dictation


Patient is a 59-year-old female who is now 4 months status post exploratory lapa


rotomy with resection of pelvic tumor and small bowel resection for what proved 


to be an ovarian carcinoma.  Well until June 28 when she developed right upper 


quadrant abdominal pain.  Abdominal ultrasound showed gallstones.  HIDA scan 


showed visualized gallbladder but without transit into the small bowel.  The 


patient became completely asymptomatic and was tolerating a regular diet she was


discharged and was doing well until yesterday when she started to develop right 


upper quadrant abdominal pain.  Abdominal ultrasound showed cholelithiasis 


without gallbladder wall thickening.  The patient admission has symptomatically 


improved.  She has had no fevers chills or jaundice.





Exam/Review of Systems


Exam


Vitals





Vital Signs


  Date      Temp  Pulse  Resp  B/P (MAP)   Pulse Ox  O2          O2 Flow    FiO2


Time                                                 Delivery    Rate


   7/11/19  98.6     72    17      112/59        98  Room Air


     08:00                           (76)








Intake and Output





7/10/19


7/10/19


7/11/19





1515:00


23:00


07:00





IntakeIntake Total


300 ml





BalanceBalance


300 ml











Constitutional:  alert, oriented


Psych:  no complaints


Head:  normocephalic


Eyes:  nl conjunctiva


ENMT:  nl external ears & nose


Neck:  supple


Respiratory:  clear to auscultation


Gastrointestinal:  soft


Musculoskeletal:  nl extremities to inspection


Extremities:  normal pulses


Neurological:  CNS II-XII intact


Skin:  nl turgor





Results


Result Diagram:  


7/11/19 0440 7/11/19 0440





Results 24hrs





Laboratory Tests


     Test
                                     7/10/19
19:18  7/11/19
04:40


     White Blood Count                               4.1  #L         4.2  L


     Red Blood Count                                3.99  #L       3.18  #L


     Hemoglobin                                     10.8  #L         8.8  L


     Hematocrit                                     34.2  #L       27.0  #L


     Mean Corpuscular Volume                          85.7           84.9


     Mean Corpuscular Hemoglobin                     27.1  L        27.7  L


     Mean Corpuscular Hemoglobin
Concent            31.6  L
        32.6  



     Red Cell Distribution Width                     19.3  H        19.0  H


     Platelet Count                                   161  #          160


     Mean Platelet Volume                            11.3  H        11.3  H


     Immature Granulocytes %                        0.500  H       0.700  H


     Neutrophils %                                                  83.2  H


     Segmented Neutrophils %
(Manual)                 90  H
  



     Band Neutrophils % (Manual)                         2


     Lymphocytes %                                                   9.7  L


     Lymphocytes % (Manual)                             3  L


     Monocytes %                                                      5.9


     Monocytes % (Manual)                                3


     Eosinophils %                                                    0.0


     Basophils %                                                      0.5


     Basophils % (Manual)                                2


     Nucleated Red Blood Cells %                       0.0            0.0


     Immature Granulocytes #                         0.020          0.030


     Neutrophils #                                                    3.5


     Neutrophils # (Manual)                            3.7


     Band Neutrophils #                                0.0


     Lymphocytes (Manual)                             0.1  L


     Lymphocytes #                                                   0.4  L


     Monocytes #                                                      0.3


     Monocytes # (Manual)                             0.1  L


     Eosinophils #                                                    0.0


     Basophils #                                                      0.0


     Basophils # (Manual)                              0.0


     Nucleated Red Blood Cells #                                      0.0


     Giant Platelets                                    1  H


     Poikilocytosis                                     1+


     Anisocytosis                                       1+


     Microcytosis                                       1+


     Urine Color                          YELLOW


     Urine Clarity
                       SLIGHTLY
CLOUDY  A  



     Urine pH                                          5.0


     Urine Specific Gravity                          1.015


     Urine Ketones                        NEGATIVE


     Urine Nitrite                        NEGATIVE


     Urine Bilirubin                      NEGATIVE


     Urine Urobilinogen                   NEGATIVE


     Urine Leukocyte Esterase             NEGATIVE


     Urine Microscopic RBC                               1


     Urine Microscopic WBC                               4


     Urine Squamous Epithelial
Cells      FEW  
              



     Urine Mucus                          FEW  A


     Urine Hemoglobin                     NEGATIVE


     Urine Glucose                                     3+  H


     Urine Total Protein                  NEGATIVE


     Sodium Level                                      144            143


     Potassium Level                                   4.1            3.9


     Chloride Level                                   111  H         112  H


     Carbon Dioxide Level                              20  L           21


     Anion Gap                                          13             10


     Blood Urea Nitrogen                                14             14


     Creatinine                                       0.76           0.64


     Est Glomerular Filtrat Rate
mL/min   > 60  
             > 60  



     Glucose Level                                     111             92


     Calcium Level                                    7.8  L         6.9  L


     Total Bilirubin                                   0.4


     Direct Bilirubin                                 0.00


     Indirect Bilirubin                                0.4


     Aspartate Amino Transf
(AST/SGOT)                62  H
  



     Alanine Aminotransferase
(ALT/SGPT)              92  H
  



     Alkaline Phosphatase                             135  H


     Total Protein                                     7.8


     Albumin                                           4.1


     Globulin                                        3.70  H


     Albumin/Globulin Ratio                           1.10


     Lipase                                             45


     Prothrombin Time                                                13.4


     Prothrombin Time Ratio                                           1.0


     INR International Normalized
Ratio   
                         1.01  



     Activated Partial
Thromboplast Time  
                         25.5  



     Hemoglobin A1c                                                   5.5


     Troponin I                                               < 0.012








Medications


Medication





Current Medications


Ondansetron HCl (Zofran Inj) 4 mg BRIDGE ORDER PRN IV NAUSEA/VOMITING Last 


administered on 7/10/19at 23:46; Admin Dose 4 MG;  Start 7/11/19 at 00:00;  Stop


7/11/19 at 23:59


Carvedilol (Coreg) 3.125 mg BID PO  Last administered on 7/11/19at 01:54; Admin 


Dose 3.125 MG;  Start 7/11/19 at 01:00


Ondansetron HCl (Zofran Tab) 4 mg DAILY PO ;  Start 7/11/19 at 09:00


Tramadol HCl (Ultram) 50 mg Q6H  PRN PO PAIN LEVEL 7-10 Last administered on 


7/11/19at 01:53; Admin Dose 50 MG;  Start 7/11/19 at 01:00


Sodium Chloride 1,000 ml @  100 mls/hr Q10H IV  Last administered on 7/11/19at 


01:52; Admin Dose 100 MLS/HR;  Start 7/11/19 at 00:34


IV Flush (NS 3 ml) 3 ml PER PROTOCOL IV ;  Start 7/11/19 at 01:00


Ondansetron HCl (Zofran Inj) 4 mg Q6H  PRN IV NAUSEA/VOMITING Last administered 


on 7/11/19at 03:52; Admin Dose 4 MG;  Start 7/11/19 at 01:00


Docusate Sodium (Colace) 100 mg Q12H  PRN PO .CONSTIPATION;  Start 7/11/19 at 


01:00


Famotidine (Pepcid Iv) 20 mg Q12 IV  Last administered on 7/11/19at 01:45; Admin


Dose 20 MG;  Start 7/11/19 at 01:00


Heparin Sodium (Porcine) (Heparin  (5000 Units/1ml)) 5,000 unit Q8 SC  Last 


administered on 7/11/19at 01:54; Admin Dose 5,000 UNIT;  Start 7/11/19 at 01:00


Hydromorphone HCl (Dilaudid) 0.5 mg Q2H  PRN IV SEVERE PAIN LEVEL 7-10 Last 


administered on 7/11/19at 03:53; Admin Dose 0.5 MG;  Start 7/11/19 at 04:00


Diagnostic Test (Pha) (Accu-Chek) 1 ea Q6 XX ;  Start 7/11/19 at 12:00











JESSICA ARROYO MD             Jul 11, 2019 09:24

## 2019-07-11 NOTE — PDOCDIS
Discharge Instructions


CONDITION


                 [id number]
Patient Condition:  [id number]
Good








HOME CARE INSTRUCTIONS:


[id number]
Diet Instructions:            [id number]
 is:  [id number]
avoid fatty and greasy foods








ACTIVITY:


          [id number]
Activity Restrictions:  [id number]
No Restrictions








FOLLOW UP/APPOINTMENTS


Follow-up Plan


pcp 1 week











JOY RAMIREZ MD                  Jul 11, 2019 11:47

## 2019-07-11 NOTE — ERD
ER Documentation


Chief Complaint


Chief Complaint





ABD PAIN WITH NAUSEA /VOMITING  , ON CHEMO





HPI


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 


with severe epigastric pain that started at 4 PM today.  The pain is aching, 


constant, nonradiating, with no alleviating or exacerbating factors.  She has ha


d associated nausea but no vomiting.  No fevers, chills, diarrhea, constipation.


 No change in bowel movements.  Pain is currently a 9 out of 10.





ROS


All systems reviewed and are negative except as per history of present illness.





Medications


Home Meds


Active Scripts


Tramadol HCl (Tramadol HCl) 50 Mg Tablet, 50 MG PO Q6H PRN for PAIN LEVEL 7-10, 


#30 TAB


   Prov:JOY RAMIREZ MD         6/30/19


Hydrocodone/Acetaminophen (Norco 5-325 Tablet) 1 Each Tablet, 1 EACH PO Q4 for 7


Days, #30 TAB


   Prov:JOY RAMIREZ MD         6/30/19


Reported Medications


Ondansetron Hcl* (Ondansetron Hcl*) 4 Mg Tablet, 4 MG PO DAILY for 30 Days


   6/28/19


Empagliflozin (Jardiance) 10 Mg Tablet, 10 MG PO DAILY, TAB


   12/29/18


Omeprazole* (Omeprazole*) 40 Mg Capsule.dr, 40 MG PO DAILY, #30 CAP


   12/29/18


Carvedilol* (Carvedilol*) 3.125 Mg Tablet, 3.125 MG PO BID, #60 TAB


   12/29/18


Discontinued Scripts


Amoxicillin/Potassium Clav (Amox-Clav 875-125 mg Tablet) 875-125 mg Tab, 1 TAB 


PO BID for 5 Days, #10 TAB


   Prov:JOY RAMIREZ MD         6/30/19


[Filgrastim-Aafi] 480 MCG/0.8 ML SOLUTION No Conflict Check, 480 MCG SC 


DAILY@1700 for 4 Days


   Prov:JOY RAMIREZ MD         6/30/19





Allergies


Allergies:  


Coded Allergies:  


     acetaminophen (Unverified  Allergy, Unknown, ITCHING, 7/10/19)


     hydrocodone (Unverified  Allergy, Unknown, ITCHING, 7/10/19)





PMhx/Soc


History of Surgery:  Yes (abdominal tumor removal March 2019,hysterectomy 


2011,removal of JJ stent'19)


Anesthesia Reaction:  No


Hx Neurological Disorder:  No


Hx Respiratory Disorders:  No


Hx Cardiac Disorders:  Yes (htn)


Hx Psychiatric Problems:  No


Hx Miscellaneous Medical Probl:  Yes (Ovarian CA)


Hx Alcohol Use:  No


Hx Substance Use:  No


Hx Tobacco Use:  No


Smoking Status:  Never smoker





FmHx


Family History:  No diabetes





Physical Exam


Vitals





Vital Signs


  Date      Temp  Pulse  Resp  B/P (MAP)   Pulse Ox  O2          O2 Flow    FiO2


Time                                                 Delivery    Rate


   7/10/19           76    19      144/71       100  Room Air


     20:30                           (95)


   7/10/19  98.1     84    18      171/79        99


     17:31                          (109)





Physical Exam


Const:   No acute distress


Head:   Atraumatic 


Eyes:    Normal Conjunctiva


ENT:    Normal External Ears, Nose and Mouth.


Neck:               Full range of motion. No meningismus.


Resp:   Clear to auscultation bilaterally


Cardio:   Regular rate and rhythm, no murmurs


Abd:    Soft, tender to palpation in the epigastrium with no rebound or 


guarding.  Negative Singer sign.  No McBurney's point tenderness.  No masses.  


Non distended. Normal bowel sounds


Skin:   No petechiae or rashes


Back:   No midline or flank tenderness


Ext:    No cyanosis, or edema


Neur:   Awake and alert


Psych:    Normal Mood and Affect


Result Diagram:  


7/10/19 1918                                                                    


           7/10/19 1918





Results 24 hrs





Laboratory Tests


              Test
                                  7/10/19
19:18


              White Blood Count                       4.1 10^3/ul


              Red Blood Count                        3.99 10^6/ul


              Hemoglobin                                10.8 g/dl


              Hematocrit                                   34.2 %


              Mean Corpuscular Volume                     85.7 fl


              Mean Corpuscular Hemoglobin                 27.1 pg


              Mean Corpuscular Hemoglobin
Concent      31.6 g/dl 



              Red Cell Distribution Width                  19.3 %


              Platelet Count                          161 10^3/UL


              Mean Platelet Volume                        11.3 fl


              Immature Granulocytes %                     0.500 %


              Neutrophils %                         %


              Segmented Neutrophils %
(Manual)              90 % 



              Band Neutrophils % (Manual)                     2 %


              Lymphocytes %                         %


              Lymphocytes % (Manual)                          3 %


              Monocytes %                           %


              Monocytes % (Manual)                            3 %


              Eosinophils %                         %


              Basophils %                           %


              Basophils % (Manual)                            2 %


              Nucleated Red Blood Cells %             0.0 /100WBC


              Immature Granulocytes #               0.020 10^3/ul


              Neutrophils #                         10^3/ul


              Neutrophils # (Manual)                  3.7 10^3/ul


              Band Neutrophils #                      0.0 10^3/ul


              Lymphocytes (Manual)                    0.1 10^3/ul


              Lymphocytes #                         10^3/ul


              Monocytes #                           10^3/ul


              Monocytes # (Manual)                    0.1 10^3/ul


              Eosinophils #                         10^3/ul


              Basophils #                           10^3/ul


              Basophils # (Manual)                    0.0 10^3/ul


              Nucleated Red Blood Cells #           10^3/ul


              Giant Platelets                                 1 %


              Poikilocytosis                                   1+


              Anisocytosis                                     1+


              Microcytosis                                     1+


              Urine Color                          YELLOW


              Urine Clarity
                       SLIGHTLY
CLOUDY


              Urine pH                                        5.0


              Urine Specific Gravity                        1.015


              Urine Ketones                        NEGATIVE mg/dL


              Urine Nitrite                        NEGATIVE mg/dL


              Urine Bilirubin                      NEGATIVE mg/dL


              Urine Urobilinogen                   NEGATIVE mg/dL


              Urine Leukocyte Esterase
            NEGATIVE
Cherie/ul


              Urine Microscopic RBC                        1 /HPF


              Urine Microscopic WBC                        4 /HPF


              Urine Squamous Epithelial
Cells      FEW /HPF 



              Urine Mucus                          FEW /HPF


              Urine Hemoglobin                     NEGATIVE mg/dL


              Urine Glucose                              3+ mg/dL


              Urine Total Protein                  NEGATIVE mg/dl


              Sodium Level                             144 mmol/L


              Potassium Level                          4.1 mmol/L


              Chloride Level                           111 mmol/L


              Carbon Dioxide Level                      20 mmol/L


              Anion Gap                                        13


              Blood Urea Nitrogen                        14 mg/dl


              Creatinine                               0.76 mg/dl


              Est Glomerular Filtrat Rate
mL/min   > 60 mL/min 



              Glucose Level                             111 mg/dl


              Calcium Level                             7.8 mg/dl


              Total Bilirubin                           0.4 mg/dl


              Direct Bilirubin                         0.00 mg/dl


              Indirect Bilirubin                        0.4 mg/dl


              Aspartate Amino Transf
(AST/SGOT)          62 IU/L 



              Alanine Aminotransferase
(ALT/SGPT)        92 IU/L 



              Alkaline Phosphatase                       135 IU/L


              Total Protein                              7.8 g/dl


              Albumin                                    4.1 g/dl


              Globulin                                  3.70 g/dl


              Albumin/Globulin Ratio                         1.10


              Lipase                                       45 U/L





Current Medications


 Medications
   Dose
          Sig/Gracia
       Start Time
   Status  Last


 (Trade)       Ordered        Route
 PRN     Stop Time              Admin
Dose


                              Reason                                Admin


 Sodium         1,000 ml @ 
   Q1H STAT
      7/10/19       DC           7/10/19


Chloride       1,000 mls/hr   IV
            21:06
                       22:06



                                             7/10/19 22:05


 Morphine       4 mg           ONCE  STAT
    7/10/19       DC           7/10/19


Sulfate
                      IV
            21:06
                       22:06



(morphine)                                   7/10/19 21:07


 Ondansetron    4 mg           ONCE  STAT
    7/10/19       DC           7/10/19


HCl
  (Zofran                 IV
            21:06
                       22:06



Inj)                                         7/10/19 21:07


                0.5 mg         ONCE  STAT
    7/10/19       DC           7/10/19


Hydromorphone                 IV
            23:41
                       23:46



HCl
                                         7/10/19 23:42


(Dilaudid)


 Ondansetron    4 mg           BRIDGE ORDER   7/11/19                    7/10/19


HCl
  (Zofran                 PRN
 IV
       00:00
                       23:46



Inj)                          NAUSEA/VOMITI  7/11/19 23:59


                              NG








Procedures/MDM


EMERGENT LABS AND DIAGNOSTIC STUDIES: 


Lab Results above were reviewed and interpreted by me. 





CBC: Mild leukopenia and mild anemia.  Normal platelets


CMP: No evidence of clinically significant electrolyte abnormality, acidosis, 


renal failure, hypoglycemia, liver disease, or biliary obstruction


Lipase: no evidence of pancreatitis 


___________________________________________________________ 


Radiology Results as interpreted by Radiology below were reviewed by TEOFILO Asif MD: 





ultrasound gallbladder shows evidence of stones in gallbladder neck .  No 


evidence of cholecystitis.





___________________________________________________________ 


Initial Nursing notes reviewed. 


Previous Medical Records requested via the Electronic Health Record. 





EMERGENCY DEPARTMENT COURSE / MEDICAL DECISION MAKING: 











Patient presents with epigastric pain and has unremarkable vitals.  Differential


includes but is not limited to biliary colic, biliary obstruction, acute 


cholecystitis, pancreatitis, hepatitis, lower lobe pneumonia, gastritis, 


colitis, cardiac pathology, aortic dissection, ureterolithiasis, pyelonephritis.


Labs were ordered to evaluate for above and were notable for mild transaminitis.


  





Based on work-up, patient likely suffering from choledocholithiasis.  No 


evidence of cholecystitis.  She will be admitted for pain control and surgical 


consult with possible intervention.  I spoke with the surgeon on-call, Dr. Purdy, will be consulting on the patient.  Patient will be admitted to Dr. Russell.





Departure


Diagnosis:  


   Primary Impression:  


   Abdominal pain


   Abdominal location:  epigastric  Qualified Codes:  R10.13 - Epigastric pain


   Additional Impression:  


   Choledocholithiasis


Condition:  PENNY White MD         Jul 11, 2019 00:14

## 2019-07-11 NOTE — HP
DATE OF ADMISSION: 07/10/2019

 

CHIEF COMPLAINT:  Abdominal pain associated with nausea and vomiting.

 

HISTORY OF PRESENT ILLNESS:  A 59-year-old female with history of recurrent ovarian cancer, status po
st debulking surgery several months prior to admission, who presented to emergency room with complain
t of constant abdominal pain associated with nausea and vomiting.  The patient is undergoing chemothe
rapy.  She was admitted about 2 weeks prior to admission with similar symptoms and diagnosed with bryan
iary colic.  HIDA scan at that time was negative and the patient was discharged in a stable condition
.  At this time, her pain is resolved.  She was evaluated by Dr. Arroyo and HIDA scan has been ordere
d.

 

LABORATORY DATA:  Her white blood cell count was normal at 4.2 with hemoglobin of 8.8 and a platelet 
count of 160,000.  Basic metabolic panel is normal.  AST was 62, ALT 92, alkaline phosphatase 135, to
lata bilirubin was normal and lipase was also normal at 45.  Ultrasound of the gallbladder showed chol
elithiasis without ultrasound evidence of cholecystitis.  Her pain resolved following admission.

 

PAST MEDICAL HISTORY:

1.  Cholelithiasis.

2.  History of biliary colic.

3.  Recurrent ovarian cancer.

4.  Gastroesophageal reflux disease.

5.  Type 2 diabetes mellitus.

 

PAST SURGICAL HISTORY:  Status post pelvic surgery and debulking several months prior to admission.

 

MEDICATIONS PRIOR TO ADMISSION:

1.  Carvedilol 3.125 mg b.i.d.

2.  Norco.

3.  Tramadol.

4.  Omeprazole.

5.  Zofran.

6.  Jardiance.

 

SOCIAL HISTORY:  Patient lives at home.  She denies tobacco or alcohol use.

 

PHYSICAL EXAMINATION:

GENERAL:  Well-developed, well-nourished female who is in no apparent distress.

VITAL SIGNS:  Stable.  She is afebrile.

HEENT:  Extraocular muscles intact.  Pupils equal and reactive to light bilaterally.  Sclerae are ani
cteric.  Patient has alopecia.

NECK:  Supple.

LUNGS:  Clear to auscultation bilaterally.

CARDIAC:  Regular rate and rhythm.  No murmurs, rubs or gallops.

ABDOMEN:  Soft.  Mild epigastric tenderness, no right upper quadrant tenderness.  Normoactive bowel s
ounds.

EXTREMITIES:  No clubbing, cyanosis, or edema.

NEUROLOGICAL:  Nonfocal.

 

ASSESSMENT:

1.  A 59-year-old female with recurrent abdominal pain associated with nausea and vomiting.  This is 
most likely recurrent biliary colic.  There is no clinical evidence of acute cholecystitis.

2.  History of ovarian cancer status post debulking surgery.  The patient is undergoing chemotherapy.


3.  Gastroesophageal reflux disease.

4.  Hypertension.

5.  Type 2 diabetes mellitus.

 

PLAN:

1.  Admit to med/surg.  Continue current therapy.

2.  Await the results of HIDA scan.

3.  Surgical followup is appreciated.

 

 

Dictated By: JOY PARISI/CHONG

DD:    07/11/2019 11:45:20

DT:    07/11/2019 13:27:39

Conf#: [id number]

DID#:  [id number]

CC: LIZBETH HERNANDEZ MD; JESSICA ARROYO MD;*EndCC*

## 2019-07-14 NOTE — PN
Date/Time of Note


Date/Time of Note


DATE: 2/27/19 


TIME: 08:44





Subjective


Reports feeling better.  Continues to have lower abdominal pain.  No nausea or 


vomiting.





Objective


Vitals





Vital Signs


  Date      Temp  Pulse  Resp  B/P (MAP)   Pulse Ox  O2          O2 Flow    FiO2


Time                                                 Delivery    Rate


   2/27/19  98.5     82    17      184/84        98  Room Air


     08:00                          (117)








Intake and Output





2/26/19 2/26/19 2/27/19





1515:00


23:00


07:00





IntakeIntake Total


380 ml


380 ml


1600 ml





OutputOutput Total


500 ml





BalanceBalance


-120 ml


380 ml


1600 ml











Lungs are clear to auscultation


Cardiac regular rate and rhythm


Abdomen is soft, mildly distended, lower abdominal tenderness to palpation.  


Normoactive bowel sounds


No edema in extremities


Neurological nonfocal





Results


Result Diagram:  


2/26/19 0212                                                                    


           2/27/19 0607








Medications


Medications





Current Medications


Sodium Chloride 1,000 ml @  100 mls/hr Q10H IV  Last administered on 2/27/19at 


00:11; Admin Dose 100 MLS/HR;  Start 2/26/19 at 10:55


IV Flush (NS 3 ml) 3 ml PER PROTOCOL IV ;  Start 2/26/19 at 11:00


IV Flush (NS 3 ml) 3 ml PER PROTOCOL IV ;  Start 2/26/19 at 11:00


Acetaminophen (Tylenol Tab) 650 mg Q6H  PRN PO .PAIN 1-3 OR TEMP;  Start 2/26/19


at 11:00


Morphine Sulfate (morphine) 2 mg Q4H  PRN IV .SEVERE PAIN 7-10 Last administered


on 2/26/19at 15:56; Admin Dose 2 MG;  Start 2/26/19 at 11:00


Docusate Sodium (Colace) 100 mg Q12H  PRN PO .CONSTIPATION;  Start 2/26/19 at 


11:00


Zolpidem Tartrate (Ambien) 5 mg QHS  PRN PO .INSOMNIA;  Start 2/26/19 at 11:00


Famotidine (Pepcid) 20 mg DAILY PO  Last administered on 2/26/19at 12:13; Admin 


Dose 20 MG;  Start 2/26/19 at 11:30


Carvedilol (Coreg) 3.125 mg BID PO  Last administered on 2/26/19at 20:43; Admin 


Dose 3.125 MG;  Start 2/26/19 at 11:30


Diagnostic Test (Pha) (Accu-Chek) 1 ea 02 XX ;  Start 2/27/19 at 02:00


Insulin Aspart (Novolog Insulin Pen) NOVOLOG *MILD* ALGORITHM WITH MEALS  


BEDTIME SC ;  Start 2/26/19 at 12:00


Miscellaneous Information 1 ea NOTE XX ;  Start 2/26/19 at 12:30


Glucose (Glutose) 15 gm Q15M  PRN PO DECREASED GLUCOSE;  Start 2/26/19 at 12:30


Glucose (Glutose) 22.5 gm Q15M  PRN PO DECREASED GLUCOSE;  Start 2/26/19 at 


12:30


Dextrose (D50w Syringe) 25 ml Q15M  PRN IV DECREASED GLUCOSE;  Start 2/26/19 at 


12:30


Dextrose (D50w Syringe) 50 ml Q15M  PRN IV DECREASED GLUCOSE;  Start 2/26/19 at 


12:30


Glucagon (Glucagen) 1 mg Q15M  PRN IM DECREASED GLUCOSE;  Start 2/26/19 at 12:30


Glucose (Glutose) 15 gm Q15M  PRN BUCCAL DECREASED GLUCOSE;  Start 2/26/19 at 


12:30





VTE Prophylaxis


Risk score (from Nsg)>0 risk:  4


SCD applied (from Nsg):  Yes





Lines/Catheters


IV Catheter Type:  Saline Lock


Central line still needed:  No


Tidwell in Place:  No





Assessment/Plan


Assessment/Plan


58-year-old female with pelvic mass of unclear etiology


History of uterine cancer.  Status post total hysterectomy in October 2011.  CA 


125 is normal


Acute kidney injury, slowly improving


Mild bilateral hydronephrosis


Hypertension


Type 2 diabetes mellitus





Continue IV fluid hydration


Await Gyn onc evaluation


Nephrology follow-up


Ambulate with JOY AHN MD                  Feb 27, 2019 08:47 Francisco Meadows MD;  Location: [address] [place] [address];  Hansel Sharma MD  Authorized by: Hansel Sharma Hansel Sharma MD  07/14/19 [id number]       Hansel Sharma MD  07/14/19 [id number]

## 2019-07-17 ENCOUNTER — HOSPITAL ENCOUNTER (INPATIENT)
Dept: HOSPITAL 91 - TEL | Age: 59
LOS: 4 days | Discharge: HOME | DRG: 445 | End: 2019-07-21
Payer: COMMERCIAL

## 2019-07-17 DIAGNOSIS — C56.2: ICD-10-CM

## 2019-07-17 DIAGNOSIS — R11.2: ICD-10-CM

## 2019-07-17 DIAGNOSIS — E86.0: ICD-10-CM

## 2019-07-17 DIAGNOSIS — K21.9: ICD-10-CM

## 2019-07-17 DIAGNOSIS — I10: ICD-10-CM

## 2019-07-17 DIAGNOSIS — D64.81: ICD-10-CM

## 2019-07-17 DIAGNOSIS — K80.50: Primary | ICD-10-CM

## 2019-07-17 DIAGNOSIS — D72.810: ICD-10-CM

## 2019-07-17 DIAGNOSIS — D70.1: ICD-10-CM

## 2019-07-17 DIAGNOSIS — E11.8: ICD-10-CM

## 2019-07-17 DIAGNOSIS — D70.9: ICD-10-CM

## 2019-07-17 DIAGNOSIS — E87.6: ICD-10-CM

## 2019-07-17 LAB
ABNORMAL IP MESSAGE: 1
ADD MAN DIFF?: YES
ALANINE AMINOTRANSFERASE: 154 IU/L (ref 13–69)
ALBUMIN/GLOBULIN RATIO: 1.09
ALBUMIN: 4.6 G/DL (ref 3.3–4.9)
ALKALINE PHOSPHATASE: 133 IU/L (ref 42–121)
ANION GAP: 16 (ref 5–13)
ASPARTATE AMINO TRANSFERASE: 81 IU/L (ref 15–46)
BILIRUBIN,DIRECT: 0 MG/DL (ref 0–0.2)
BILIRUBIN,TOTAL: 1.1 MG/DL (ref 0.2–1.3)
BLOOD UREA NITROGEN: 19 MG/DL (ref 7–20)
CALCIUM: 8.4 MG/DL (ref 8.4–10.2)
CARBON DIOXIDE: 23 MMOL/L (ref 21–31)
CHLORIDE: 99 MMOL/L (ref 97–110)
CREATININE: 1.18 MG/DL (ref 0.44–1)
GLOBULIN: 4.2 G/DL (ref 1.3–3.2)
GLUCOSE: 122 MG/DL (ref 70–220)
HEMATOCRIT: 31 % (ref 37–47)
HEMOGLOBIN: 10.1 G/DL (ref 12–16)
IMMATURE GRANS #M: 0 10^3/UL (ref 0–0.03)
IMMATURE GRANS % (M): 0 % (ref 0–0.43)
LIPASE: 121 U/L (ref 23–300)
MEAN CORPUSCULAR HEMOGLOBIN: 27.6 PG (ref 29–33)
MEAN CORPUSCULAR HGB CONC: 32.6 G/DL (ref 32–37)
MEAN CORPUSCULAR VOLUME: 84.7 FL (ref 82–101)
MEAN PLATELET VOLUME: 11.3 FL (ref 7.4–10.4)
NUCLEATED RED BLOOD CELLS%: 0 /100WBC (ref 0–0)
PLATELET COUNT: 196 10^3/UL (ref 140–415)
POSITIVE DIFF: (no result)
POTASSIUM: 2.4 MMOL/L (ref 3.5–5.1)
RED BLOOD COUNT: 3.66 10^6/UL (ref 4.2–5.4)
RED CELL DISTRIBUTION WIDTH: 18.2 % (ref 11.5–14.5)
SODIUM: 138 MMOL/L (ref 135–144)
TOTAL PROTEIN: 8.8 G/DL (ref 6.1–8.1)
WHITE BLOOD COUNT: 0.5 10^3/UL (ref 4.8–10.8)

## 2019-07-17 PROCEDURE — 99285 EMERGENCY DEPT VISIT HI MDM: CPT

## 2019-07-17 PROCEDURE — 93005 ELECTROCARDIOGRAM TRACING: CPT

## 2019-07-17 PROCEDURE — 83690 ASSAY OF LIPASE: CPT

## 2019-07-17 PROCEDURE — 36415 COLL VENOUS BLD VENIPUNCTURE: CPT

## 2019-07-17 PROCEDURE — 80053 COMPREHEN METABOLIC PANEL: CPT

## 2019-07-17 PROCEDURE — 85025 COMPLETE CBC W/AUTO DIFF WBC: CPT

## 2019-07-17 PROCEDURE — 96374 THER/PROPH/DIAG INJ IV PUSH: CPT

## 2019-07-17 PROCEDURE — 96375 TX/PRO/DX INJ NEW DRUG ADDON: CPT

## 2019-07-17 PROCEDURE — 76705 ECHO EXAM OF ABDOMEN: CPT

## 2019-07-17 PROCEDURE — 80048 BASIC METABOLIC PNL TOTAL CA: CPT

## 2019-07-17 PROCEDURE — 84132 ASSAY OF SERUM POTASSIUM: CPT

## 2019-07-17 PROCEDURE — 82962 GLUCOSE BLOOD TEST: CPT

## 2019-07-17 PROCEDURE — 83036 HEMOGLOBIN GLYCOSYLATED A1C: CPT

## 2019-07-17 RX ADMIN — PYRIDOXINE HYDROCHLORIDE 1 MLS/HR: 100 INJECTION, SOLUTION INTRAMUSCULAR; INTRAVENOUS at 21:31

## 2019-07-17 RX ADMIN — POTASSIUM CHLORIDE 1 MEQ: 1500 TABLET, EXTENDED RELEASE ORAL at 23:23

## 2019-07-17 RX ADMIN — POTASSIUM CHLORIDE 1 MLS/HR: 200 INJECTION, SOLUTION INTRAVENOUS at 23:23

## 2019-07-17 RX ADMIN — ONDANSETRON HYDROCHLORIDE 1 MG: 2 INJECTION, SOLUTION INTRAMUSCULAR; INTRAVENOUS at 21:31

## 2019-07-17 RX ADMIN — HYDROMORPHONE HYDROCHLORIDE 1 MG: 1 INJECTION, SOLUTION INTRAMUSCULAR; INTRAVENOUS; SUBCUTANEOUS at 21:31

## 2019-07-17 RX ADMIN — MAGNESIUM SULFATE HEPTAHYDRATE 1 MLS/HR: 1 INJECTION, SOLUTION INTRAVENOUS at 23:49

## 2019-07-18 LAB
ABNORMAL IP MESSAGE: 1
ADD MAN DIFF?: YES
ANION GAP: 8 (ref 5–13)
ANISOCYTOSIS: (no result) (ref 0–0)
ANISOCYTOSIS: (no result) (ref 0–0)
BAND NEUTROPHILS #M: 0 10^3/UL (ref 0–0.6)
BAND NEUTROPHILS #M: 0 10^3/UL (ref 0–0.6)
BAND NEUTROPHILS % (M): 1 % (ref 0–4)
BAND NEUTROPHILS % (M): 2 % (ref 0–4)
BASOPHIL #: 0 10^3/UL (ref 0–0.1)
BASOPHIL #M: 0 10^3/UL (ref 0–0)
BASOPHILS % (M): 1 % (ref 0–2)
BASOPHILS %: 0 % (ref 0–2)
BLOOD UREA NITROGEN: 18 MG/DL (ref 7–20)
CALCIUM: 7.6 MG/DL (ref 8.4–10.2)
CARBON DIOXIDE: 27 MMOL/L (ref 21–31)
CHLORIDE: 104 MMOL/L (ref 97–110)
CREATININE: 0.82 MG/DL (ref 0.44–1)
EOSINOPHILS #: 0 10^3/UL (ref 0–0.5)
EOSINOPHILS %: 0 % (ref 0–7)
ERYTHROBLAST% (NRBC) (M): 1 % (ref 0–0)
GIANT THROMBO% (M): 16 % (ref 0–0)
GIANT THROMBO% (M): 31 % (ref 0–0)
GLUCOSE: 119 MG/DL (ref 70–220)
HEMATOCRIT: 26.9 % (ref 37–47)
HEMOGLOBIN A1C: 5.8 % (ref 0–5.9)
HEMOGLOBIN: 8.5 G/DL (ref 12–16)
IMMATURE GRANS #M: 0 10^3/UL (ref 0–0.03)
IMMATURE GRANS % (M): 0 % (ref 0–0.43)
LYMPHOCYTES #: 0.4 10^3/UL (ref 0.8–2.9)
LYMPHOCYTES #M: 0.4 10^3/UL (ref 0.8–2.9)
LYMPHOCYTES #M: 0.4 10^3/UL (ref 0.8–2.9)
LYMPHOCYTES % (M): 80 % (ref 15–51)
LYMPHOCYTES % (M): 84 % (ref 15–51)
LYMPHOCYTES %: 67.3 % (ref 15–51)
MEAN CORPUSCULAR HEMOGLOBIN: 27.2 PG (ref 29–33)
MEAN CORPUSCULAR HGB CONC: 31.6 G/DL (ref 32–37)
MEAN CORPUSCULAR VOLUME: 86.2 FL (ref 82–101)
MEAN PLATELET VOLUME: 11.3 FL (ref 7.4–10.4)
MICROCYTOSIS: (no result) (ref 0–0)
MICROCYTOSIS: (no result) (ref 0–0)
MONOCYTE #: 0.1 10^3/UL (ref 0.3–0.9)
MONOCYTE #M: 0 10^3/UL (ref 0.3–0.9)
MONOCYTE #M: 0 10^3/UL (ref 0.3–0.9)
MONOCYTES % (M): 9 % (ref 0–11)
MONOCYTES % (M): 9 % (ref 0–11)
MONOCYTES %: 26.9 % (ref 0–11)
NEUTROPHIL #: 0 10^3/UL (ref 1.6–7.5)
NEUTROPHILS %: 5.8 % (ref 39–77)
NUCLEATED RED BLOOD CELLS #: 0 10^3/UL (ref 0–0)
NUCLEATED RED BLOOD CELLS%: 0 /100WBC (ref 0–0)
PLATELET COUNT: 143 10^3/UL (ref 140–415)
PLATELET ESTIMATE: NORMAL
PLATELET ESTIMATE: NORMAL
POIKILOCYTOSIS: (no result) (ref 0–0)
POLYCHROMASIA: (no result) (ref 0–0)
POSITIVE DIFF: (no result)
POTASSIUM: 4.9 MMOL/L (ref 3.5–5.1)
POTASSIUM: 5.8 MMOL/L (ref 3.5–5.1)
RBC MORPHOLOGY COMMENT: (no result)
REACTIVE LYMPHOCYTES #M: 0 10^3/UL (ref 0–0)
REACTIVE LYMPHOCYTES% (M): 9 % (ref 0–0)
RED BLOOD COUNT: 3.12 10^6/UL (ref 4.2–5.4)
RED CELL DISTRIBUTION WIDTH: 18.6 % (ref 11.5–14.5)
SEG NEUT #M: 0 10^3/UL (ref 1.6–7.5)
SEG NEUT #M: 0 10^3/UL (ref 1.6–7.5)
SEGMENTED NEUTROPHILS (M) %: 1 % (ref 39–77)
SEGMENTED NEUTROPHILS (M) %: 5 % (ref 39–77)
SMUDGE%M: 4 % (ref 0–0)
SMUDGE%M: 8 % (ref 0–0)
SODIUM: 139 MMOL/L (ref 135–144)
WBC MORPHOLOGY COMMENT: (no result)
WHITE BLOOD COUNT: 0.6 10^3/UL (ref 4.8–10.8)

## 2019-07-18 RX ADMIN — HYDROMORPHONE HYDROCHLORIDE 1 MG: 1 INJECTION, SOLUTION INTRAMUSCULAR; INTRAVENOUS; SUBCUTANEOUS at 02:26

## 2019-07-18 RX ADMIN — METOCLOPRAMIDE HYDROCHLORIDE 1 MG: 10 INJECTION, SOLUTION INTRAMUSCULAR; INTRAVENOUS at 22:57

## 2019-07-18 RX ADMIN — EMPAGLIFLOZIN 1 MG: 10 TABLET, FILM COATED ORAL at 10:04

## 2019-07-18 RX ADMIN — FAMOTIDINE 1 MG: 20 TABLET ORAL at 21:34

## 2019-07-18 RX ADMIN — FAMOTIDINE 1 MG: 20 TABLET ORAL at 01:54

## 2019-07-18 RX ADMIN — SODIUM CHLORIDE AND POTASSIUM CHLORIDE 1 MLS/HR: .9; .15 SOLUTION INTRAVENOUS at 06:17

## 2019-07-18 RX ADMIN — FILGRASTIM-AAFI 1 MCG: 480 INJECTION, SOLUTION SUBCUTANEOUS at 17:31

## 2019-07-18 RX ADMIN — SODIUM CHLORIDE AND POTASSIUM CHLORIDE 1 MLS/HR: .9; .15 SOLUTION INTRAVENOUS at 10:04

## 2019-07-18 RX ADMIN — HYDROMORPHONE HYDROCHLORIDE 1 MG: 1 INJECTION, SOLUTION INTRAMUSCULAR; INTRAVENOUS; SUBCUTANEOUS at 13:54

## 2019-07-18 RX ADMIN — POTASSIUM CHLORIDE 1 MLS/HR: 200 INJECTION, SOLUTION INTRAVENOUS at 01:55

## 2019-07-18 RX ADMIN — ONDANSETRON HYDROCHLORIDE 1 MG: 4 TABLET, FILM COATED ORAL at 10:04

## 2019-07-18 RX ADMIN — HYDROMORPHONE HYDROCHLORIDE 1 MG: 1 INJECTION, SOLUTION INTRAMUSCULAR; INTRAVENOUS; SUBCUTANEOUS at 07:38

## 2019-07-18 RX ADMIN — HYDROMORPHONE HYDROCHLORIDE 1 MG: 1 INJECTION, SOLUTION INTRAMUSCULAR; INTRAVENOUS; SUBCUTANEOUS at 21:32

## 2019-07-18 RX ADMIN — ENOXAPARIN SODIUM 1 MG: 100 INJECTION SUBCUTANEOUS at 10:26

## 2019-07-18 RX ADMIN — HYDROMORPHONE HYDROCHLORIDE 1 MG: 1 INJECTION, SOLUTION INTRAMUSCULAR; INTRAVENOUS; SUBCUTANEOUS at 17:29

## 2019-07-18 RX ADMIN — FAMOTIDINE 1 MG: 20 TABLET ORAL at 10:04

## 2019-07-19 LAB
ABNORMAL IP MESSAGE: 1
ADD MAN DIFF?: YES
ANION GAP: 9 (ref 5–13)
ANISOCYTOSIS: (no result) (ref 0–0)
BASOPHIL #M: 0 10^3/UL (ref 0–0)
BASOPHILS % (M): 3 % (ref 0–2)
BLOOD UREA NITROGEN: 14 MG/DL (ref 7–20)
CALCIUM: 7.8 MG/DL (ref 8.4–10.2)
CARBON DIOXIDE: 25 MMOL/L (ref 21–31)
CHLORIDE: 103 MMOL/L (ref 97–110)
CREATININE: 0.79 MG/DL (ref 0.44–1)
ERYTHROBLAST% (NRBC) (M): 1 % (ref 0–0)
GIANT THROMBO% (M): 50 % (ref 0–0)
GLUCOSE: 87 MG/DL (ref 70–220)
HEMATOCRIT: 24.2 % (ref 37–47)
HEMOGLOBIN: 7.6 G/DL (ref 12–16)
IMMATURE GRANS #M: 0 10^3/UL (ref 0–0.03)
IMMATURE GRANS % (M): 0 % (ref 0–0.43)
LYMPHOCYTES #M: 0.4 10^3/UL (ref 0.8–2.9)
LYMPHOCYTES % (M): 87 % (ref 15–51)
MEAN CORPUSCULAR HEMOGLOBIN: 27.8 PG (ref 29–33)
MEAN CORPUSCULAR HGB CONC: 31.4 G/DL (ref 32–37)
MEAN CORPUSCULAR VOLUME: 88.6 FL (ref 82–101)
MEAN PLATELET VOLUME: 11.4 FL (ref 7.4–10.4)
MICROCYTOSIS: (no result) (ref 0–0)
MONOCYTE #M: 0 10^3/UL (ref 0.3–0.9)
MONOCYTES % (M): 6 % (ref 0–11)
NUCLEATED RED BLOOD CELLS%: 0 /100WBC (ref 0–0)
PLATELET COUNT: 106 10^3/UL (ref 140–415)
PLATELET ESTIMATE: (no result)
POSITIVE DIFF: (no result)
POTASSIUM: 4.3 MMOL/L (ref 3.5–5.1)
PROMYELOCYTES #M: 0 10^3/UL (ref 0–0)
PROMYELOCYTES % (M): 1 % (ref 0–0)
RED BLOOD COUNT: 2.73 10^6/UL (ref 4.2–5.4)
RED CELL DISTRIBUTION WIDTH: 18.6 % (ref 11.5–14.5)
SEGMENTED NEUTROPHILS (M) %: 3 % (ref 39–77)
SMUDGE%M: 15 % (ref 0–0)
SODIUM: 137 MMOL/L (ref 135–144)
WHITE BLOOD COUNT: 0.5 10^3/UL (ref 4.8–10.8)

## 2019-07-19 RX ADMIN — ENOXAPARIN SODIUM 1 MG: 100 INJECTION SUBCUTANEOUS at 08:32

## 2019-07-19 RX ADMIN — HYDROMORPHONE HYDROCHLORIDE 1 MG: 1 INJECTION, SOLUTION INTRAMUSCULAR; INTRAVENOUS; SUBCUTANEOUS at 14:49

## 2019-07-19 RX ADMIN — FILGRASTIM-AAFI 1 MCG: 480 INJECTION, SOLUTION SUBCUTANEOUS at 17:48

## 2019-07-19 RX ADMIN — METOCLOPRAMIDE HYDROCHLORIDE 1 MG: 10 INJECTION, SOLUTION INTRAMUSCULAR; INTRAVENOUS at 14:49

## 2019-07-19 RX ADMIN — ONDANSETRON HYDROCHLORIDE 1 MG: 4 TABLET, FILM COATED ORAL at 08:27

## 2019-07-19 RX ADMIN — EMPAGLIFLOZIN 1 MG: 10 TABLET, FILM COATED ORAL at 08:27

## 2019-07-19 RX ADMIN — FAMOTIDINE 1 MG: 20 TABLET ORAL at 20:48

## 2019-07-19 RX ADMIN — FAMOTIDINE 1 MG: 20 TABLET ORAL at 08:27

## 2019-07-19 RX ADMIN — METOCLOPRAMIDE HYDROCHLORIDE 1 MG: 10 INJECTION, SOLUTION INTRAMUSCULAR; INTRAVENOUS at 20:54

## 2019-07-19 RX ADMIN — HYDROMORPHONE HYDROCHLORIDE 1 MG: 1 INJECTION, SOLUTION INTRAMUSCULAR; INTRAVENOUS; SUBCUTANEOUS at 19:48

## 2019-07-19 RX ADMIN — HYDROMORPHONE HYDROCHLORIDE 1 MG: 1 INJECTION, SOLUTION INTRAMUSCULAR; INTRAVENOUS; SUBCUTANEOUS at 04:06

## 2019-07-19 RX ADMIN — HYDROMORPHONE HYDROCHLORIDE 1 MG: 1 INJECTION, SOLUTION INTRAMUSCULAR; INTRAVENOUS; SUBCUTANEOUS at 23:03

## 2019-07-20 LAB
ABNORMAL IP MESSAGE: 1
ADD MAN DIFF?: YES
ANISOCYTOSIS: (no result) (ref 0–0)
BAND NEUTROPHILS #M: 0 10^3/UL (ref 0–0.6)
BAND NEUTROPHILS % (M): 6 % (ref 0–4)
BASOPHIL #M: 0 10^3/UL (ref 0–0)
BASOPHILS % (M): 1 % (ref 0–2)
GIANT THROMBO% (M): 46 % (ref 0–0)
HEMATOCRIT: 24 % (ref 37–47)
HEMOGLOBIN: 7.7 G/DL (ref 12–16)
IMMATURE GRANS #M: 0 10^3/UL (ref 0–0.03)
IMMATURE GRANS % (M): 0 % (ref 0–0.43)
LYMPHOCYTES #M: 0.6 10^3/UL (ref 0.8–2.9)
LYMPHOCYTES % (M): 70 % (ref 15–51)
MEAN CORPUSCULAR HEMOGLOBIN: 28.2 PG (ref 29–33)
MEAN CORPUSCULAR HGB CONC: 32.1 G/DL (ref 32–37)
MEAN CORPUSCULAR VOLUME: 87.9 FL (ref 82–101)
MEAN PLATELET VOLUME: 11.3 FL (ref 7.4–10.4)
MONOCYTE #M: 0.2 10^3/UL (ref 0.3–0.9)
MONOCYTES % (M): 23 % (ref 0–11)
NUCLEATED RED BLOOD CELLS%: 0 /100WBC (ref 0–0)
PLATELET COUNT: 85 10^3/UL (ref 140–415)
PLATELET ESTIMATE: (no result)
POIKILOCYTOSIS: (no result) (ref 0–0)
POLYCHROMASIA: (no result) (ref 0–0)
POSITIVE DIFF: (no result)
RED BLOOD COUNT: 2.73 10^6/UL (ref 4.2–5.4)
RED CELL DISTRIBUTION WIDTH: 18.2 % (ref 11.5–14.5)
SMUDGE%M: 15 % (ref 0–0)
WHITE BLOOD COUNT: 0.9 10^3/UL (ref 4.8–10.8)

## 2019-07-20 RX ADMIN — FAMOTIDINE 1 MG: 20 TABLET ORAL at 08:56

## 2019-07-20 RX ADMIN — ENOXAPARIN SODIUM 1 MG: 100 INJECTION SUBCUTANEOUS at 09:43

## 2019-07-20 RX ADMIN — ONDANSETRON HYDROCHLORIDE 1 MG: 4 TABLET, FILM COATED ORAL at 08:56

## 2019-07-20 RX ADMIN — FILGRASTIM-AAFI 1 MCG: 480 INJECTION, SOLUTION SUBCUTANEOUS at 18:09

## 2019-07-20 RX ADMIN — FAMOTIDINE 1 MG: 20 TABLET ORAL at 21:00

## 2019-07-20 RX ADMIN — EMPAGLIFLOZIN 1 MG: 10 TABLET, FILM COATED ORAL at 08:56

## 2019-07-20 RX ADMIN — DOCUSATE SODIUM 1 MG: 100 CAPSULE, LIQUID FILLED ORAL at 22:51

## 2019-07-21 LAB
ABNORMAL IP MESSAGE: 1
ADD MAN DIFF?: YES
ANISOCYTOSIS: (no result) (ref 0–0)
BAND NEUTROPHILS #M: 0.5 10^3/UL (ref 0–0.6)
BAND NEUTROPHILS % (M): 24 % (ref 0–4)
GIANT THROMBO% (M): 4 % (ref 0–0)
HEMATOCRIT: 23.9 % (ref 37–47)
HEMOGLOBIN: 7.8 G/DL (ref 12–16)
IMMATURE GRANS #M: 0.04 10^3/UL (ref 0–0.03)
IMMATURE GRANS % (M): 1.7 % (ref 0–0.43)
LYMPHOCYTES #M: 0.9 10^3/UL (ref 0.8–2.9)
LYMPHOCYTES % (M): 38 % (ref 15–51)
MACROCYTOSIS: (no result) (ref 0–0)
MEAN CORPUSCULAR HEMOGLOBIN: 28.3 PG (ref 29–33)
MEAN CORPUSCULAR HGB CONC: 32.6 G/DL (ref 32–37)
MEAN CORPUSCULAR VOLUME: 86.6 FL (ref 82–101)
MEAN PLATELET VOLUME: 12.5 FL (ref 7.4–10.4)
MICROCYTOSIS: (no result) (ref 0–0)
MONOCYTE #M: 0.3 10^3/UL (ref 0.3–0.9)
MONOCYTES % (M): 14 % (ref 0–11)
NUCLEATED RED BLOOD CELLS%: 0 /100WBC (ref 0–0)
PLATELET COUNT: 66 10^3/UL (ref 140–415)
PLATELET ESTIMATE: (no result)
POLYCHROMASIA: (no result) (ref 0–0)
POSITIVE DIFF: (no result)
REACTIVE LYMPHOCYTES #M: 0 10^3/UL (ref 0–0)
REACTIVE LYMPHOCYTES% (M): 2 % (ref 0–0)
RED BLOOD COUNT: 2.76 10^6/UL (ref 4.2–5.4)
RED CELL DISTRIBUTION WIDTH: 18 % (ref 11.5–14.5)
SEG NEUT #M: 0.5 10^3/UL (ref 1.6–7.5)
SEGMENTED NEUTROPHILS (M) %: 22 % (ref 39–77)
SMUDGE%M: 2 % (ref 0–0)
TOXIC GRANULATION: (no result) (ref 0–0)
WHITE BLOOD COUNT: 2.4 10^3/UL (ref 4.8–10.8)

## 2019-07-21 RX ADMIN — ONDANSETRON HYDROCHLORIDE 1 MG: 4 TABLET, FILM COATED ORAL at 08:09

## 2019-07-21 RX ADMIN — ENOXAPARIN SODIUM 1 MG: 100 INJECTION SUBCUTANEOUS at 09:46

## 2019-07-21 RX ADMIN — DOCUSATE SODIUM 1 MG: 100 CAPSULE, LIQUID FILLED ORAL at 11:30

## 2019-07-21 RX ADMIN — EMPAGLIFLOZIN 1 MG: 10 TABLET, FILM COATED ORAL at 08:09

## 2019-07-21 RX ADMIN — HYDROMORPHONE HYDROCHLORIDE 1 MG: 1 INJECTION, SOLUTION INTRAMUSCULAR; INTRAVENOUS; SUBCUTANEOUS at 08:09

## 2019-07-21 RX ADMIN — FAMOTIDINE 1 MG: 20 TABLET ORAL at 08:09

## 2019-07-27 ENCOUNTER — HOSPITAL ENCOUNTER (OUTPATIENT)
Dept: HOSPITAL 10 - E/R | Age: 59
Setting detail: OBSERVATION
LOS: 2 days | Discharge: HOME | End: 2019-07-29
Attending: INTERNAL MEDICINE | Admitting: INTERNAL MEDICINE
Payer: COMMERCIAL

## 2019-07-27 ENCOUNTER — HOSPITAL ENCOUNTER (OUTPATIENT)
Dept: HOSPITAL 91 - E/R | Age: 59
Setting detail: OBSERVATION
LOS: 2 days | Discharge: HOME | End: 2019-07-29
Payer: COMMERCIAL

## 2019-07-27 VITALS
BODY MASS INDEX: 24.76 KG/M2 | HEIGHT: 58 IN | HEIGHT: 58 IN | BODY MASS INDEX: 24.76 KG/M2 | WEIGHT: 117.95 LBS | WEIGHT: 117.95 LBS

## 2019-07-27 VITALS — SYSTOLIC BLOOD PRESSURE: 121 MMHG | DIASTOLIC BLOOD PRESSURE: 59 MMHG | HEART RATE: 68 BPM | RESPIRATION RATE: 18 BRPM

## 2019-07-27 VITALS — DIASTOLIC BLOOD PRESSURE: 52 MMHG | HEART RATE: 69 BPM | SYSTOLIC BLOOD PRESSURE: 95 MMHG | RESPIRATION RATE: 18 BRPM

## 2019-07-27 VITALS — DIASTOLIC BLOOD PRESSURE: 58 MMHG | SYSTOLIC BLOOD PRESSURE: 135 MMHG | RESPIRATION RATE: 20 BRPM | HEART RATE: 60 BPM

## 2019-07-27 DIAGNOSIS — D69.6: ICD-10-CM

## 2019-07-27 DIAGNOSIS — E11.9: ICD-10-CM

## 2019-07-27 DIAGNOSIS — C56.9: ICD-10-CM

## 2019-07-27 DIAGNOSIS — R10.9: Primary | ICD-10-CM

## 2019-07-27 DIAGNOSIS — D64.9: ICD-10-CM

## 2019-07-27 DIAGNOSIS — I10: ICD-10-CM

## 2019-07-27 LAB
ABNORMAL IP MESSAGE: 1
ADD MAN DIFF?: NO
ALANINE AMINOTRANSFERASE: 108 IU/L (ref 13–69)
ALBUMIN/GLOBULIN RATIO: 1.18
ALBUMIN: 4.4 G/DL (ref 3.3–4.9)
ALKALINE PHOSPHATASE: 220 IU/L (ref 42–121)
ANION GAP: 14 (ref 5–13)
ANISOCYTOSIS: (no result) (ref 0–0)
ASPARTATE AMINO TRANSFERASE: 49 IU/L (ref 15–46)
BAND NEUTROPHILS #M: 0 10^3/UL (ref 0–0.6)
BAND NEUTROPHILS % (M): 2 % (ref 0–4)
BASOPHIL #: 0 10^3/UL (ref 0–0.1)
BASOPHILS %: 0.3 % (ref 0–2)
BILIRUBIN,DIRECT: 0 MG/DL (ref 0–0.2)
BILIRUBIN,TOTAL: 0.6 MG/DL (ref 0.2–1.3)
BLOOD UREA NITROGEN: 14 MG/DL (ref 7–20)
BURR CELLS: (no result) (ref 0–0)
CALCIUM: 8.1 MG/DL (ref 8.4–10.2)
CARBON DIOXIDE: 24 MMOL/L (ref 21–31)
CHLORIDE: 106 MMOL/L (ref 97–110)
CREATININE: 0.8 MG/DL (ref 0.44–1)
EOSINOPHILS #: 0 10^3/UL (ref 0–0.5)
EOSINOPHILS %: 0 % (ref 0–7)
ERYTHROBLAST% (NRBC) (M): 1 % (ref 0–0)
GIANT THROMBO% (M): 1 % (ref 0–0)
GLOBULIN: 3.7 G/DL (ref 1.3–3.2)
GLUCOSE: 118 MG/DL (ref 70–220)
HEMATOCRIT: 26.3 % (ref 37–47)
HEMOGLOBIN: 8.5 G/DL (ref 12–16)
IMMATURE GRANS #M: 0.02 10^3/UL (ref 0–0.03)
IMMATURE GRANS % (M): 0.6 % (ref 0–0.43)
LIPASE: 219 U/L (ref 23–300)
LYMPHOCYTES #: 1.1 10^3/UL (ref 0.8–2.9)
LYMPHOCYTES #M: 1.4 10^3/UL (ref 0.8–2.9)
LYMPHOCYTES % (M): 41 % (ref 15–51)
LYMPHOCYTES %: 29.3 % (ref 15–51)
MEAN CORPUSCULAR HEMOGLOBIN: 27.9 PG (ref 29–33)
MEAN CORPUSCULAR HGB CONC: 32.3 G/DL (ref 32–37)
MEAN CORPUSCULAR VOLUME: 86.2 FL (ref 82–101)
MONOCYTE #: 0.4 10^3/UL (ref 0.3–0.9)
MONOCYTE #M: 0 10^3/UL (ref 0.3–0.9)
MONOCYTES % (M): 2 % (ref 0–11)
MONOCYTES %: 10.1 % (ref 0–11)
NEUTROPHIL #: 2.1 10^3/UL (ref 1.6–7.5)
NEUTROPHILS %: 59.7 % (ref 39–77)
NUCLEATED RED BLOOD CELLS #: 0 10^3/UL (ref 0–0)
NUCLEATED RED BLOOD CELLS%: 0.8 /100WBC (ref 0–0)
OVALOCYTES: (no result) (ref 0–0)
PLASMA CELLS #M: 0 10^3/UL (ref 0–0)
PLASMAC%(M): 1 %
PLATELET COUNT: 26 10^3/UL (ref 140–415)
PLATELET ESTIMATE: (no result)
POIKILOCYTOSIS: (no result) (ref 0–0)
POLYCHROMASIA: (no result) (ref 0–0)
POSITIVE DIFF: (no result)
POTASSIUM: 3.1 MMOL/L (ref 3.5–5.1)
RED BLOOD COUNT: 3.05 10^6/UL (ref 4.2–5.4)
RED CELL DISTRIBUTION WIDTH: 18.6 % (ref 11.5–14.5)
SEG NEUT #M: 1.9 10^3/UL (ref 1.6–7.5)
SEGMENTED NEUTROPHILS (M) %: 54 % (ref 39–77)
SMUDGE%M: 12 % (ref 0–0)
SODIUM: 144 MMOL/L (ref 135–144)
TOTAL PROTEIN: 8.1 G/DL (ref 6.1–8.1)
TOXIC GRANULATION: (no result) (ref 0–0)
WHITE BLOOD COUNT: 3.6 10^3/UL (ref 4.8–10.8)

## 2019-07-27 PROCEDURE — 85025 COMPLETE CBC W/AUTO DIFF WBC: CPT

## 2019-07-27 PROCEDURE — 36415 COLL VENOUS BLD VENIPUNCTURE: CPT

## 2019-07-27 PROCEDURE — 86644 CMV ANTIBODY: CPT

## 2019-07-27 PROCEDURE — 86945 BLOOD PRODUCT/IRRADIATION: CPT

## 2019-07-27 PROCEDURE — 80053 COMPREHEN METABOLIC PANEL: CPT

## 2019-07-27 PROCEDURE — 86901 BLOOD TYPING SEROLOGIC RH(D): CPT

## 2019-07-27 PROCEDURE — 86920 COMPATIBILITY TEST SPIN: CPT

## 2019-07-27 PROCEDURE — 80048 BASIC METABOLIC PNL TOTAL CA: CPT

## 2019-07-27 PROCEDURE — 86850 RBC ANTIBODY SCREEN: CPT

## 2019-07-27 PROCEDURE — P9035 PLATELET PHERES LEUKOREDUCED: HCPCS

## 2019-07-27 PROCEDURE — 99285 EMERGENCY DEPT VISIT HI MDM: CPT

## 2019-07-27 PROCEDURE — P9016 RBC LEUKOCYTES REDUCED: HCPCS

## 2019-07-27 PROCEDURE — 36430 TRANSFUSION BLD/BLD COMPNT: CPT

## 2019-07-27 PROCEDURE — 82270 OCCULT BLOOD FECES: CPT

## 2019-07-27 PROCEDURE — 83690 ASSAY OF LIPASE: CPT

## 2019-07-27 PROCEDURE — 96374 THER/PROPH/DIAG INJ IV PUSH: CPT

## 2019-07-27 PROCEDURE — 86900 BLOOD TYPING SEROLOGIC ABO: CPT

## 2019-07-27 PROCEDURE — 96375 TX/PRO/DX INJ NEW DRUG ADDON: CPT

## 2019-07-27 RX ADMIN — ONDANSETRON HYDROCHLORIDE 1 MG: 2 INJECTION, SOLUTION INTRAMUSCULAR; INTRAVENOUS at 20:39

## 2019-07-27 RX ADMIN — DEXAMETHASONE SODIUM PHOSPHATE PRN MG: 10 INJECTION, SOLUTION INTRAMUSCULAR; INTRAVENOUS at 10:39

## 2019-07-27 RX ADMIN — ONDANSETRON HYDROCHLORIDE 1 MG: 2 INJECTION, SOLUTION INTRAMUSCULAR; INTRAVENOUS at 05:18

## 2019-07-27 RX ADMIN — KETOROLAC TROMETHAMINE 1 MG: 15 INJECTION, SOLUTION INTRAMUSCULAR; INTRAVENOUS at 11:37

## 2019-07-27 RX ADMIN — POTASSIUM CHLORIDE 1 MEQ: 1500 TABLET, EXTENDED RELEASE ORAL at 10:06

## 2019-07-27 RX ADMIN — HYDROMORPHONE HYDROCHLORIDE PRN MG: 1 INJECTION, SOLUTION INTRAMUSCULAR; INTRAVENOUS; SUBCUTANEOUS at 10:39

## 2019-07-27 RX ADMIN — DEXAMETHASONE SODIUM PHOSPHATE PRN MG: 10 INJECTION, SOLUTION INTRAMUSCULAR; INTRAVENOUS at 20:39

## 2019-07-27 RX ADMIN — HYDROMORPHONE HYDROCHLORIDE PRN MG: 1 INJECTION, SOLUTION INTRAMUSCULAR; INTRAVENOUS; SUBCUTANEOUS at 17:38

## 2019-07-27 RX ADMIN — HYDROMORPHONE HYDROCHLORIDE 1 MG: 1 INJECTION, SOLUTION INTRAMUSCULAR; INTRAVENOUS; SUBCUTANEOUS at 10:39

## 2019-07-27 RX ADMIN — HYDROMORPHONE HYDROCHLORIDE 1 MG: 1 INJECTION, SOLUTION INTRAMUSCULAR; INTRAVENOUS; SUBCUTANEOUS at 20:39

## 2019-07-27 RX ADMIN — HYDROMORPHONE HYDROCHLORIDE 1 MG: 1 INJECTION, SOLUTION INTRAMUSCULAR; INTRAVENOUS; SUBCUTANEOUS at 07:22

## 2019-07-27 RX ADMIN — POTASSIUM CHLORIDE 1 MEQ: 1500 TABLET, EXTENDED RELEASE ORAL at 07:00

## 2019-07-27 RX ADMIN — ONDANSETRON HYDROCHLORIDE 1 MG: 2 INJECTION, SOLUTION INTRAMUSCULAR; INTRAVENOUS at 10:39

## 2019-07-27 RX ADMIN — HYDROMORPHONE HYDROCHLORIDE 1 MG: 1 INJECTION, SOLUTION INTRAMUSCULAR; INTRAVENOUS; SUBCUTANEOUS at 17:38

## 2019-07-27 RX ADMIN — HYDROMORPHONE HYDROCHLORIDE 1 MG: 1 INJECTION, SOLUTION INTRAMUSCULAR; INTRAVENOUS; SUBCUTANEOUS at 05:22

## 2019-07-27 RX ADMIN — HYDROMORPHONE HYDROCHLORIDE PRN MG: 1 INJECTION, SOLUTION INTRAMUSCULAR; INTRAVENOUS; SUBCUTANEOUS at 20:39

## 2019-07-27 NOTE — ERD
ER Documentation


Chief Complaint


Chief Complaint





abdominal pain





HPI


The patient is a 59-year-old female, presenting to the ER because of generalized


weakness, recurrent abdominal pain for a day, worse for the last few hours.  She


had similar symptoms previously, was discharged from the hospital about 6 days 


ago.  She denies fever, chills, neck pain, chest pain, dyspnea, complains of 


vague and diffuse abdominal pain, denies dysuria, diarrhea she does not smoke 


nor drink.  She complains of left eye redness for the last 2 days, denies any 


pain or discharge





Past medical history: Hypertension, cholelithiasis, diabetes mellitus


Past surgical history: Hysterectomy, ovarian cancer status post debulking 


surgery and currently on chemotherapy





ROS


All systems reviewed and are negative except as per history of present illness.





Medications


Home Meds


Active Scripts


Famotidine* (Famotidine*) 20 Mg Tablet, 20 MG PO Q12 for 30 Days, #60 TAB


   Prov:LIZBETH HERNANDEZ M.D.         7/21/19


Docusate Sodium* (Colace*) 100 Mg Capsule, 100 MG PO DAILY for 30 Days, #30 CAP


   Prov:LIZBETH HERNANDEZ M.D.         7/21/19


Hydrocodone/Acetaminophen (Norco 5-325 Tablet) 1 Each Tablet, 1 EACH PO Q4 for 7


Days, #30 TAB


   Prov:JOY AZUL MD         6/30/19


Reported Medications


Tramadol HCl (Tramadol HCl) 50 Mg Tablet, 50 MG PO Q6H PRN for PAIN LEVEL 6-10, 


#120 TAB


   7/27/19


Ondansetron Hcl* (Ondansetron Hcl*) 4 Mg Tablet, 4 MG PO DAILY for 30 Days


   6/28/19


Empagliflozin (Jardiance) 10 Mg Tablet, 10 MG PO DAILY, TAB


   12/29/18


Carvedilol* (Carvedilol*) 3.125 Mg Tablet, 3.125 MG PO BID, #60 TAB


   12/29/18


Discontinued Reported Medications


Omeprazole* (Omeprazole*) 40 Mg Capsule.dr, 40 MG PO DAILY, #30 CAP


   12/29/18


Discontinued Scripts


Tramadol HCl (Tramadol HCl) 50 Mg Tablet, 50 MG PO Q6H PRN for PAIN LEVEL 7-10, 


#30 TAB


   Prov:JOY AZUL MD         6/30/19





Allergies


Allergies:  


Coded Allergies:  


     acetaminophen (Unverified  Allergy, Unknown, ITCHING, 7/10/19)


     hydrocodone (Unverified  Allergy, Unknown, ITCHING, 7/10/19)





PMhx/Soc


History of Surgery:  Yes (2007 total hysterectomy, abdominal tumor removal 2019 


)


Anesthesia Reaction:  No


Hx Neurological Disorder:  No


Hx Respiratory Disorders:  No


Hx Cardiac Disorders:  Yes (HTN )


Hx Psychiatric Problems:  No


Hx Miscellaneous Medical Probl:  Yes (ovarian cancer)


Hx Alcohol Use:  No


Hx Substance Use:  No


Hx Tobacco Use:  No





Physical Exam


Vitals





Vital Signs


  Date      Temp  Pulse  Resp  B/P (MAP)   Pulse Ox  O2          O2 Flow    FiO2


Time                                                 Delivery    Rate


   7/27/19  98.5     90    18      147/65       100


     02:33                           (92)





Physical Exam


 Const:      No acute distress.


 Head:        Atraumatic.


 Eyes:       Left eye with erythematous medial conjunctiva, no discharge


 ENT:         Normal External Ears, Nose and Mouth.


 Neck:        Full range of motion.  No meningismus.


 Resp:         Clear to auscultation bilaterally.


 Cardio:       Regular rate and rhythm.


 Abd:         Soft,  non distended, normal bowel sounds, vague and diffuse 


abdominal tenderness, no rigidity/rebound or CVA tenderness


 Skin:         No petechiae or rashes.


 Back:        No midline or flank tenderness.


 Ext:          No cyanosis, or edema.


 Neur:        Awake and alert. No focal deficit


 Psych:        Normal Mood and Affect.


Result Diagram:  


7/27/19 0515                                                                    


           7/27/19 0515





Results 24 hrs





Laboratory Tests


              Test
                                 7/27/19
05:15


              White Blood Count                      3.6 10^3/ul


              Red Blood Count                       3.05 10^6/ul


              Hemoglobin                                8.5 g/dl


              Hematocrit                                  26.3 %


              Mean Corpuscular Volume                    86.2 fl


              Mean Corpuscular Hemoglobin                27.9 pg


              Mean Corpuscular Hemoglobin
Concent     32.3 g/dl 



              Red Cell Distribution Width                 18.6 %


              Platelet Count                          26 10^3/UL


              Mean Platelet Volume                  fl


              Immature Granulocytes %                    0.600 %


              Neutrophils %                               59.7 %


              Lymphocytes %                               29.3 %


              Monocytes %                                 10.1 %


              Eosinophils %                                0.0 %


              Basophils %                                  0.3 %


              Nucleated Red Blood Cells %            0.8 /100WBC


              Immature Granulocytes #              0.020 10^3/ul


              Neutrophils #                          2.1 10^3/ul


              Lymphocytes #                          1.1 10^3/ul


              Monocytes #                            0.4 10^3/ul


              Eosinophils #                          0.0 10^3/ul


              Basophils #                            0.0 10^3/ul


              Nucleated Red Blood Cells #            0.0 10^3/ul


              Sodium Level                            144 mmol/L


              Potassium Level                         3.1 mmol/L


              Chloride Level                          106 mmol/L


              Carbon Dioxide Level                     24 mmol/L


              Anion Gap                                       14


              Blood Urea Nitrogen                       14 mg/dl


              Creatinine                              0.80 mg/dl


              Est Glomerular Filtrat Rate
mL/min   > 60 mL/min 



              Glucose Level                            118 mg/dl


              Calcium Level                            8.1 mg/dl


              Total Bilirubin                          0.6 mg/dl


              Direct Bilirubin                        0.00 mg/dl


              Indirect Bilirubin                       0.6 mg/dl


              Aspartate Amino Transf
(AST/SGOT)         49 IU/L 



              Alanine Aminotransferase
(ALT/SGPT)      108 IU/L 



              Alkaline Phosphatase                      220 IU/L


              Total Protein                             8.1 g/dl


              Albumin                                   4.4 g/dl


              Globulin                                 3.70 g/dl


              Albumin/Globulin Ratio                        1.18


              Lipase                                     219 U/L





Current Medications


 Medications
   Dose
          Sig/Gracia
       Start Time
   Status  Last


 (Trade)       Ordered        Route
 PRN     Stop Time              Admin
Dose


                              Reason                                Admin


                0.5 mg         ONCE  STAT
    7/27/19       DC           7/27/19


Hydromorphone                 IV
            04:59
                       05:22



HCl
                                         7/27/19 05:00


(Dilaudid)


 Ondansetron    4 mg           ONCE  STAT
    7/27/19       DC           7/27/19


HCl
  (Zofran                 IV
            04:59
                       05:18



Inj)                                         7/27/19 05:00








Procedures/MDM


MEDICAL MAKING DECISION: The patient is a 59-year-old female, presenting with 


acute genic weakness, acute abdominal pain, most likely due to cholelithiasis, 


worsening thrombocytopenia -her platelet was 66,000 on July 21, 2019.  She was 


treated with potassium chloride 40 mEq p.o. for acute hypokalemia, Dilaudid 0.5 


mg IV for pain and Zofran IV for nausea





On reevaluation, she felt better but not strong and well enough to go home





The differential diagnoses considered include but are not limited to 


cholelithiasis, cholecystitis,  choledocholithiasis, cholangitis, pancreatitis, 


hepatitis, gastritis, peptic ulcer disease, gastric ulcer, appendicitis,  


cystitis,  diverticulitis, partial small bowel obstruction.





Departure


Diagnosis:  


   Primary Impression:  


   Abdominal pain


   Additional Impressions:  


   Hypokalemia


   Pancytopenia


   Abnormal LFTs


Condition:  Stable


Comments


I discussed the findings with the patient. I am waiting for the [facility] 


hospitalist Dr Azul to admit the patient. The patient is admitted to [facility] 





Disclaimer: Inadvertent spelling and grammatical errors are likely due to 


EHR/dictation software use and do not reflect on the overall quality of patient 


care. Also, please note that the electronic time recorded on this note does not 


necessarily reflect the actual time of the patient encounter.











DEEP PACKER MD              Jul 27, 2019 04:50

## 2019-07-27 NOTE — HP
DATE OF ADMISSION: 07/27/2019

 

CHIEF COMPLAINT:  Abdominal pain.

 

HISTORY OF PRESENT ILLNESS:  A 59-year-old female with recurrent ovarian cancer, status post debulkin
g surgery several months prior to admission, followed by systemic chemotherapy, presents to emergency
 room with complaint of right upper quadrant and lower abdominal pain associated with nausea and vomi
ting.  The patient had been diagnosed with cholelithiasis and multiple episodes of biliary colic in t
he past.  She has been evaluated with a HIDA scan and she was evaluated with several HIDA scans in th
e past and there was no evidence of acute cholecystitis.

 

REVIEW OF SYSTEMS:  She denies hematemesis.  No bright red blood per rectum or melena.  No fevers or 
chills.  She complains of feeling weak.  Last chemotherapy was on July 10, 2019.   CBC showed WBC of 
3.6, hemoglobin of 8.5.  Platelet count is reduced to 26,000.  Patient was also hypokalemic with pota
ssium of 3.1.  Lipase was normal at 219.  Liver function tests were mildly elevated, but total biliru
bin was 0.6.

 

PAST MEDICAL HISTORY:

1.  Recurrent ovarian cancer.

2.  Cholelithiasis.

3.  Type 2 diabetes mellitus.

4.  Hypertension.

 

MEDICATIONS PRIOR TO ADMISSION:

1.  Coreg 3.125 mg twice a day.

2.  Norco as needed.

3.  Tramadol as needed.

4.  Pepcid 20 mg twice a day 

5.  Jardiance 10 mg daily.

 

PHYSICAL EXAMINATION:

GENERAL:  Well-developed, well-nourished female with alopecia.

HEENT:  Extraocular muscles intact.  Pupils equal and reactive to light bilaterally.  Sclerae are ani
cteric.  Oropharynx is clear and moist.

NECK:  Supple, no JVD, no carotid bruits.

LUNGS:  Clear to auscultation bilaterally.

CARDIAC:  Regular rate and rhythm.  No murmurs, rubs or gallops.

ABDOMEN:  Soft, right upper quadrant and lower abdominal tenderness to palpation.  No rebound or guar
ding.  Normoactive bowel sounds.

EXTREMITIES:  No clubbing, cyanosis, or edema.

NEUROLOGICAL:  Nonfocal.

 

ASSESSMENT:

1.  A 69-year-old female with recurrent abdominal pain.  The patient has had several admissions in th
e recent past and several visits to the emergency room.  She was diagnosed with cholelithiasis.  Surg
ical intervention was held since the patient was undergoing chemotherapy.

2.  Recurrent ovarian cancer, undergoing chemotherapy.

3.  Newly diagnosed thrombocytopenia.  This may well be induced by recent chemotherapy.

4.  Hypertension.

5.  Type 2 diabetes mellitus.

6.  Hypokalemia.

 

PLAN:

1.  Place in med/surg observation.  

2.  Potassium supplementation.

3.  Pain control.

4.  Monitor labs.

5.  Oncology consultation was requested.

 

 

Dictated By: JOY RAMIREZ MD

 

SK/NTS

DD:    07/27/2019 09:28:30

DT:    07/27/2019 10:44:52

Conf#: [id number]

DID#:  [id number]

CC: DEEP PACKER MD; JOY RAMIREZ MD;*EndCC*

## 2019-07-28 VITALS — DIASTOLIC BLOOD PRESSURE: 53 MMHG | SYSTOLIC BLOOD PRESSURE: 115 MMHG | HEART RATE: 68 BPM | RESPIRATION RATE: 18 BRPM

## 2019-07-28 VITALS — SYSTOLIC BLOOD PRESSURE: 95 MMHG | DIASTOLIC BLOOD PRESSURE: 44 MMHG | HEART RATE: 66 BPM | RESPIRATION RATE: 18 BRPM

## 2019-07-28 VITALS — SYSTOLIC BLOOD PRESSURE: 102 MMHG | HEART RATE: 66 BPM | RESPIRATION RATE: 17 BRPM | DIASTOLIC BLOOD PRESSURE: 51 MMHG

## 2019-07-28 VITALS — DIASTOLIC BLOOD PRESSURE: 55 MMHG | SYSTOLIC BLOOD PRESSURE: 110 MMHG | HEART RATE: 72 BPM | RESPIRATION RATE: 18 BRPM

## 2019-07-28 LAB
ABNORMAL IP MESSAGE: 1
ABNORMAL IP MESSAGE: 1
ADD MAN DIFF?: YES
ADD MAN DIFF?: YES
ANION GAP: 6 (ref 5–13)
ANISOCYTOSIS: (no result) (ref 0–0)
BAND NEUTROPHILS #M: 0 10^3/UL (ref 0–0.6)
BAND NEUTROPHILS % (M): 2 % (ref 0–4)
BASOPHIL #M: 0 10^3/UL (ref 0–0)
BASOPHILS % (M): 2 % (ref 0–2)
BLOOD UREA NITROGEN: 17 MG/DL (ref 7–20)
CALCIUM: 7.5 MG/DL (ref 8.4–10.2)
CARBON DIOXIDE: 30 MMOL/L (ref 21–31)
CHLORIDE: 105 MMOL/L (ref 97–110)
CREATININE: 0.77 MG/DL (ref 0.44–1)
EOSINOPHILS % (M): 1 % (ref 0–7)
ERYTHROBLAST% (NRBC) (M): 1 % (ref 0–0)
GIANT THROMBO% (M): 2 % (ref 0–0)
GLUCOSE: 95 MG/DL (ref 70–220)
HEMATOCRIT: 20.6 % (ref 37–47)
HEMATOCRIT: 21.7 % (ref 37–47)
HEMOGLOBIN: 6.5 G/DL (ref 12–16)
HEMOGLOBIN: 6.7 G/DL (ref 12–16)
IMMATURE GRANS #M: 0.01 10^3/UL (ref 0–0.03)
IMMATURE GRANS #M: 0.02 10^3/UL (ref 0–0.03)
IMMATURE GRANS % (M): 0.5 % (ref 0–0.43)
IMMATURE GRANS % (M): 1 % (ref 0–0.43)
IMMEDIATE SPIN CROSSMATCH: 1 2
LYMPHOCYTES #M: 0.7 10^3/UL (ref 0.8–2.9)
LYMPHOCYTES % (M): 41 % (ref 15–51)
MEAN CORPUSCULAR HEMOGLOBIN: 27.6 PG (ref 29–33)
MEAN CORPUSCULAR HEMOGLOBIN: 28.1 PG (ref 29–33)
MEAN CORPUSCULAR HGB CONC: 30.9 G/DL (ref 32–37)
MEAN CORPUSCULAR HGB CONC: 31.6 G/DL (ref 32–37)
MEAN CORPUSCULAR VOLUME: 89.2 FL (ref 82–101)
MEAN CORPUSCULAR VOLUME: 89.3 FL (ref 82–101)
MEAN PLATELET VOLUME: 10.4 FL (ref 7.4–10.4)
MEAN PLATELET VOLUME: 11.3 FL (ref 7.4–10.4)
MICROCYTOSIS: (no result) (ref 0–0)
MONOCYTE #M: 0 10^3/UL (ref 0.3–0.9)
MONOCYTES % (M): 5 % (ref 0–11)
NUCLEATED RED BLOOD CELLS%: 0.9 /100WBC (ref 0–0)
NUCLEATED RED BLOOD CELLS%: 1 /100WBC (ref 0–0)
OVALOCYTES: (no result) (ref 0–0)
PLATELET COUNT: 59 10^3/UL (ref 140–415)
PLATELET COUNT: 73 10^3/UL (ref 140–415)
PLATELET ESTIMATE: (no result)
POIKILOCYTOSIS: (no result) (ref 0–0)
POLYCHROMASIA: (no result) (ref 0–0)
POSITIVE DIFF: (no result)
POSITIVE DIFF: (no result)
POTASSIUM: 3.8 MMOL/L (ref 3.5–5.1)
REACTIVE LYMPHOCYTES #M: 0 10^3/UL (ref 0–0)
REACTIVE LYMPHOCYTES% (M): 3 % (ref 0–0)
RED BLOOD COUNT: 2.31 10^6/UL (ref 4.2–5.4)
RED BLOOD COUNT: 2.43 10^6/UL (ref 4.2–5.4)
RED CELL DISTRIBUTION WIDTH: 18.9 % (ref 11.5–14.5)
RED CELL DISTRIBUTION WIDTH: 19 % (ref 11.5–14.5)
SEG NEUT #M: 0.9 10^3/UL (ref 1.6–7.5)
SEGMENTED NEUTROPHILS (M) %: 47 % (ref 39–77)
SMUDGE%M: 4 % (ref 0–0)
SODIUM: 141 MMOL/L (ref 135–144)
TEAR DROP CELLS: (no result) (ref 0–0)
TOXIC GRANULATION: (no result) (ref 0–0)
WHITE BLOOD COUNT: 1.9 10^3/UL (ref 4.8–10.8)
WHITE BLOOD COUNT: 2.2 10^3/UL (ref 4.8–10.8)

## 2019-07-28 RX ADMIN — ONDANSETRON SCH MG: 4 TABLET, FILM COATED ORAL at 08:21

## 2019-07-28 RX ADMIN — HYDROMORPHONE HYDROCHLORIDE PRN MG: 1 INJECTION, SOLUTION INTRAMUSCULAR; INTRAVENOUS; SUBCUTANEOUS at 00:13

## 2019-07-28 RX ADMIN — EMPAGLIFLOZIN 1 MG: 10 TABLET, FILM COATED ORAL at 08:21

## 2019-07-28 RX ADMIN — HYDROMORPHONE HYDROCHLORIDE 1 MG: 1 INJECTION, SOLUTION INTRAMUSCULAR; INTRAVENOUS; SUBCUTANEOUS at 12:20

## 2019-07-28 RX ADMIN — HYDROMORPHONE HYDROCHLORIDE PRN MG: 1 INJECTION, SOLUTION INTRAMUSCULAR; INTRAVENOUS; SUBCUTANEOUS at 12:20

## 2019-07-28 RX ADMIN — ONDANSETRON HYDROCHLORIDE 1 MG: 4 TABLET, FILM COATED ORAL at 08:21

## 2019-07-28 RX ADMIN — HYDROMORPHONE HYDROCHLORIDE 1 MG: 1 INJECTION, SOLUTION INTRAMUSCULAR; INTRAVENOUS; SUBCUTANEOUS at 00:13

## 2019-07-28 RX ADMIN — EMPAGLIFLOZIN SCH MG: 10 TABLET, FILM COATED ORAL at 08:21

## 2019-07-28 NOTE — PDOCDIS
Discharge Instructions


CONDITION


                 [id number]
Patient Condition:  [id number]
Good








HOME CARE INSTRUCTIONS:


               [id number]
Diet Instructions:  [id number]
       [id number]
Activity Restrictions:  [id number]
No Restrictions








FOLLOW UP/APPOINTMENTS


Follow-up Plan


pcp 1 week





Dr Lerma 1 week











JOY RAMIREZ MD                  Jul 28, 2019 08:46

## 2019-07-28 NOTE — PN
Date/Time of Note


Date/Time of Note


DATE: 7/28/19 


TIME: 09:29





Subjective


Doing well.  No new complaints.





Objective


Vitals





Vital Signs


  Date      Temp  Pulse  Resp  B/P (MAP)   Pulse Ox  O2          O2 Flow    FiO2


Time                                                 Delivery    Rate


   7/28/19  97.9     68    18      115/53       100


     07:47                           (73)


   7/27/19                                           Room Air


     14:00


   7/27/19                                                             2.0


     11:00








Intake and Output





7/27/19 7/27/19 7/28/19





1515:00


23:00


07:00





IntakeIntake Total


840 ml


440 ml





OutputOutput Total


400 ml





BalanceBalance


440 ml


440 ml











Clear to auscultation bilaterally


Regular rate and rhythm


Soft mildly tender.  No rebound or guarding.  Normoactive bowel sounds.


No edema


Nonfocal





Results


Result Diagram:  


7/28/19 0800                                                                    


           7/28/19 0800








Medications


Medications





Current Medications


Ondansetron HCl (Zofran Inj) 4 mg Q4H  PRN IV nausea Last administered on 


7/27/19at 20:39; Admin Dose 4 MG;  Start 7/27/19 at 09:30


Hydromorphone HCl (Dilaudid) 0.5 mg Q3H  PRN IV mod pain Last administered on 


7/28/19at 00:13; Admin Dose 0.5 MG;  Start 7/27/19 at 10:00


Carvedilol (Coreg) 3.125 mg BID PO  Last administered on 7/28/19at 08:21; Admin 


Dose 3.125 MG;  Start 7/27/19 at 13:00


Empaglifozin (Jardiance) 10 mg DAILY@08 PO  Last administered on 7/28/19at 


08:21; Admin Dose 10 MG;  Start 7/28/19 at 08:00


Ondansetron HCl (Zofran Tab) 4 mg DAILY PO  Last administered on 7/28/19at 


08:21; Admin Dose 4 MG;  Start 7/28/19 at 09:00





VTE Prophylaxis


Risk score (from Nsg)>0 risk:  4


SCD applied (from Nsg):  Yes





Lines/Catheters


IV Catheter Type:  Saline Lock


Tidwell in Place:  No





Assessment/Plan


Assessment/Plan


59-year-old female with recurrent abdominal pain, resolved


Cholelithiasis with no evidence of acute cholecystitis


Ovarian cancer, undergoing chemotherapy


Thrombocytopenia, most likely induced by chemotherapy, status post platelet 


transfusion


Type 2 diabetes mellitus


Hypertension


Anemia and leukopenia.  Rule out lab error





Repeat CBC


Discharge planning if CBC is okay


Case was discussed with her oncologist and daughter at the bedside











JOY RAMIREZ MD                  Jul 28, 2019 09:36

## 2019-07-29 VITALS — HEART RATE: 68 BPM | RESPIRATION RATE: 19 BRPM | SYSTOLIC BLOOD PRESSURE: 103 MMHG | DIASTOLIC BLOOD PRESSURE: 58 MMHG

## 2019-07-29 VITALS — RESPIRATION RATE: 20 BRPM | SYSTOLIC BLOOD PRESSURE: 117 MMHG | HEART RATE: 76 BPM | DIASTOLIC BLOOD PRESSURE: 58 MMHG

## 2019-07-29 LAB
ABNORMAL IP MESSAGE: 1
ADD MAN DIFF?: NO
ANION GAP: 9 (ref 5–13)
BASOPHIL #: 0 10^3/UL (ref 0–0.1)
BASOPHILS %: 0.5 % (ref 0–2)
BLOOD UREA NITROGEN: 11 MG/DL (ref 7–20)
CALCIUM: 7.6 MG/DL (ref 8.4–10.2)
CARBON DIOXIDE: 26 MMOL/L (ref 21–31)
CHLORIDE: 106 MMOL/L (ref 97–110)
CREATININE: 0.74 MG/DL (ref 0.44–1)
EOSINOPHILS #: 0 10^3/UL (ref 0–0.5)
EOSINOPHILS %: 0 % (ref 0–7)
GLUCOSE: 98 MG/DL (ref 70–220)
HEMATOCRIT: 31.5 % (ref 37–47)
HEMOGLOBIN: 10.3 G/DL (ref 12–16)
IMMATURE GRANS #M: 0.01 10^3/UL (ref 0–0.03)
IMMATURE GRANS % (M): 0.5 % (ref 0–0.43)
LYMPHOCYTES #: 0.9 10^3/UL (ref 0.8–2.9)
LYMPHOCYTES %: 41.9 % (ref 15–51)
MEAN CORPUSCULAR HEMOGLOBIN: 28.4 PG (ref 29–33)
MEAN CORPUSCULAR HGB CONC: 32.7 G/DL (ref 32–37)
MEAN CORPUSCULAR VOLUME: 86.8 FL (ref 82–101)
MEAN PLATELET VOLUME: 11.2 FL (ref 7.4–10.4)
MONOCYTE #: 0.3 10^3/UL (ref 0.3–0.9)
MONOCYTES %: 13.1 % (ref 0–11)
NEUTROPHIL #: 1 10^3/UL (ref 1.6–7.5)
NEUTROPHILS %: 44 % (ref 39–77)
NUCLEATED RED BLOOD CELLS #: 0 10^3/UL (ref 0–0)
NUCLEATED RED BLOOD CELLS%: 0 /100WBC (ref 0–0)
OCCULT BLOOD STOOL: NEGATIVE
PLATELET COUNT: 70 10^3/UL (ref 140–415)
POSITIVE DIFF: (no result)
POTASSIUM: 3.3 MMOL/L (ref 3.5–5.1)
RED BLOOD COUNT: 3.63 10^6/UL (ref 4.2–5.4)
RED CELL DISTRIBUTION WIDTH: 17.1 % (ref 11.5–14.5)
SODIUM: 141 MMOL/L (ref 135–144)
WHITE BLOOD COUNT: 2.2 10^3/UL (ref 4.8–10.8)

## 2019-07-29 RX ADMIN — EMPAGLIFLOZIN 1 MG: 10 TABLET, FILM COATED ORAL at 09:30

## 2019-07-29 RX ADMIN — HYDROMORPHONE HYDROCHLORIDE PRN MG: 1 INJECTION, SOLUTION INTRAMUSCULAR; INTRAVENOUS; SUBCUTANEOUS at 16:46

## 2019-07-29 RX ADMIN — HYDROMORPHONE HYDROCHLORIDE 1 MG: 1 INJECTION, SOLUTION INTRAMUSCULAR; INTRAVENOUS; SUBCUTANEOUS at 11:03

## 2019-07-29 RX ADMIN — EMPAGLIFLOZIN SCH MG: 10 TABLET, FILM COATED ORAL at 09:30

## 2019-07-29 RX ADMIN — ONDANSETRON SCH MG: 4 TABLET, FILM COATED ORAL at 09:30

## 2019-07-29 RX ADMIN — HYDROMORPHONE HYDROCHLORIDE 1 MG: 1 INJECTION, SOLUTION INTRAMUSCULAR; INTRAVENOUS; SUBCUTANEOUS at 16:46

## 2019-07-29 RX ADMIN — HYDROMORPHONE HYDROCHLORIDE PRN MG: 1 INJECTION, SOLUTION INTRAMUSCULAR; INTRAVENOUS; SUBCUTANEOUS at 11:03

## 2019-07-29 RX ADMIN — POTASSIUM CHLORIDE 1 MEQ: 1500 TABLET, EXTENDED RELEASE ORAL at 09:30

## 2019-07-29 RX ADMIN — ONDANSETRON HYDROCHLORIDE 1 MG: 4 TABLET, FILM COATED ORAL at 09:30

## 2019-07-30 NOTE — DS
DATE OF ADMISSION: 07/27/2019

DATE OF DISCHARGE: 07/29/2019

 

DISCHARGE DIAGNOSES:

1.  A 59-year-old female with recurrent abdominal pain, resolved.

2.  Thrombocytopenia, most likely due to recent chemotherapy.

3.  Recurrent ovarian cancer, undergoing chemotherapy.

4.  Hypertension.

5.  Type 2 diabetes mellitus.

6.  Anemia, status post packed RBC transfusion.

 

HOSPITAL COURSE:  A 59-year-old female who presented to emergency room with complaints of recurrent a
bdominal pain associated with nausea and vomiting.  Initial evaluation was again unremarkable.  The p
atient was found to have a platelet count of 26,000.  The case was discussed with Janey, her onco
logist.  He recommended platelet transfusion.  Platelet count remained about 70,000 following transfu
linn.

 

The patient was also noted to have anemia with hemoglobin as low as 6.7.  She received 2 units of pac
ked RBC and repeat hemoglobin was 10.3.  White blood cell count was 2.2.  Her abdominal pain resolved
.  The patient is in a stable condition for discharge.  She will follow up with her PCP and oncologis
t as outpatient.

 

MEDICATIONS ON DISCHARGE:

1.  Coreg 3.125 mg b.i.d. 

2.  Jardiance 10 mg daily.

3.  Pepcid 20 mg b.i.d.

4.  Tramadol 50 mg every 6 hours as needed.

 

DISCHARGE FOLLOWUP:

1.  Follow up with PCP.

2.  Follow up with oncologist.  Call for appointment.

 

 

Dictated By: JOY PARISI/CHONG

DD:    07/29/2019 09:39:44

DT:    07/30/2019 03:55:12

Conf#: [id number]

DID#:  [id number]

## 2019-08-11 ENCOUNTER — HOSPITAL ENCOUNTER (EMERGENCY)
Dept: HOSPITAL 10 - E/R | Age: 59
Discharge: HOME | End: 2019-08-11
Payer: COMMERCIAL

## 2019-08-11 ENCOUNTER — HOSPITAL ENCOUNTER (EMERGENCY)
Dept: HOSPITAL 91 - E/R | Age: 59
Discharge: HOME | End: 2019-08-11
Payer: COMMERCIAL

## 2019-08-11 VITALS
HEIGHT: 59 IN | WEIGHT: 115.3 LBS | BODY MASS INDEX: 23.24 KG/M2 | BODY MASS INDEX: 23.24 KG/M2 | WEIGHT: 115.3 LBS | HEIGHT: 59 IN

## 2019-08-11 VITALS — HEART RATE: 68 BPM | RESPIRATION RATE: 16 BRPM | DIASTOLIC BLOOD PRESSURE: 58 MMHG | SYSTOLIC BLOOD PRESSURE: 119 MMHG

## 2019-08-11 DIAGNOSIS — K80.20: Primary | ICD-10-CM

## 2019-08-11 DIAGNOSIS — C76.3: ICD-10-CM

## 2019-08-11 LAB
ADD MAN DIFF?: NO
ALANINE AMINOTRANSFERASE: 54 IU/L (ref 13–69)
ALBUMIN/GLOBULIN RATIO: 1.06
ALBUMIN: 4.6 G/DL (ref 3.3–4.9)
ALKALINE PHOSPHATASE: 138 IU/L (ref 42–121)
AMYLASE: 116 U/L (ref 11–123)
ANION GAP: 13 (ref 5–13)
ASPARTATE AMINO TRANSFERASE: 41 IU/L (ref 15–46)
BASOPHIL #: 0.1 10^3/UL (ref 0–0.1)
BASOPHILS %: 1.5 % (ref 0–2)
BILIRUBIN,DIRECT: 0 MG/DL (ref 0–0.2)
BILIRUBIN,TOTAL: 0.7 MG/DL (ref 0.2–1.3)
BLOOD UREA NITROGEN: 19 MG/DL (ref 7–20)
CALCIUM: 9.1 MG/DL (ref 8.4–10.2)
CARBON DIOXIDE: 25 MMOL/L (ref 21–31)
CHLORIDE: 102 MMOL/L (ref 97–110)
CREATININE: 0.99 MG/DL (ref 0.44–1)
EOSINOPHILS #: 0 10^3/UL (ref 0–0.5)
EOSINOPHILS %: 0.5 % (ref 0–7)
GLOBULIN: 4.3 G/DL (ref 1.3–3.2)
GLUCOSE: 103 MG/DL (ref 70–220)
HEMATOCRIT: 42.1 % (ref 37–47)
HEMOGLOBIN: 13.2 G/DL (ref 12–16)
IMMATURE GRANS #M: 0.05 10^3/UL (ref 0–0.03)
IMMATURE GRANS % (M): 0.8 % (ref 0–0.43)
INR: 0.97
LIPASE: 87 U/L (ref 23–300)
LYMPHOCYTES #: 0.9 10^3/UL (ref 0.8–2.9)
LYMPHOCYTES %: 15 % (ref 15–51)
MEAN CORPUSCULAR HEMOGLOBIN: 27.8 PG (ref 29–33)
MEAN CORPUSCULAR HGB CONC: 31.4 G/DL (ref 32–37)
MEAN CORPUSCULAR VOLUME: 88.8 FL (ref 82–101)
MEAN PLATELET VOLUME: 11.3 FL (ref 7.4–10.4)
MONOCYTE #: 0.9 10^3/UL (ref 0.3–0.9)
MONOCYTES %: 14.5 % (ref 0–11)
NEUTROPHIL #: 4.1 10^3/UL (ref 1.6–7.5)
NEUTROPHILS %: 67.7 % (ref 39–77)
NUCLEATED RED BLOOD CELLS #: 0 10^3/UL (ref 0–0)
NUCLEATED RED BLOOD CELLS%: 0 /100WBC (ref 0–0)
PARTIAL THROMBOPLASTIN TIME: 25.6 SEC (ref 23–35)
PLATELET COUNT: 381 10^3/UL (ref 140–415)
POTASSIUM: 3.3 MMOL/L (ref 3.5–5.1)
PROTIME: 13 SEC (ref 11.9–14.9)
PT RATIO: 1
RED BLOOD COUNT: 4.74 10^6/UL (ref 4.2–5.4)
RED CELL DISTRIBUTION WIDTH: 17.7 % (ref 11.5–14.5)
SODIUM: 140 MMOL/L (ref 135–144)
TOTAL PROTEIN: 8.9 G/DL (ref 6.1–8.1)
WHITE BLOOD COUNT: 6.1 10^3/UL (ref 4.8–10.8)

## 2019-08-11 PROCEDURE — 85730 THROMBOPLASTIN TIME PARTIAL: CPT

## 2019-08-11 PROCEDURE — 99284 EMERGENCY DEPT VISIT MOD MDM: CPT

## 2019-08-11 PROCEDURE — 83690 ASSAY OF LIPASE: CPT

## 2019-08-11 PROCEDURE — 96374 THER/PROPH/DIAG INJ IV PUSH: CPT

## 2019-08-11 PROCEDURE — 96375 TX/PRO/DX INJ NEW DRUG ADDON: CPT

## 2019-08-11 PROCEDURE — 85610 PROTHROMBIN TIME: CPT

## 2019-08-11 PROCEDURE — 82150 ASSAY OF AMYLASE: CPT

## 2019-08-11 PROCEDURE — 85025 COMPLETE CBC W/AUTO DIFF WBC: CPT

## 2019-08-11 PROCEDURE — 80053 COMPREHEN METABOLIC PANEL: CPT

## 2019-08-11 PROCEDURE — 96376 TX/PRO/DX INJ SAME DRUG ADON: CPT

## 2019-08-11 RX ADMIN — ONDANSETRON HYDROCHLORIDE 1 MG: 2 INJECTION, SOLUTION INTRAMUSCULAR; INTRAVENOUS at 21:17

## 2019-08-11 RX ADMIN — THIAMINE HYDROCHLORIDE 1 MLS/HR: 100 INJECTION, SOLUTION INTRAMUSCULAR; INTRAVENOUS at 18:58

## 2019-08-11 RX ADMIN — FAMOTIDINE 1 MG: 10 INJECTION, SOLUTION INTRAVENOUS at 21:17

## 2019-08-11 RX ADMIN — HYDROMORPHONE HYDROCHLORIDE 1 MG: 1 INJECTION, SOLUTION INTRAMUSCULAR; INTRAVENOUS; SUBCUTANEOUS at 20:24

## 2019-08-11 RX ADMIN — ONDANSETRON HYDROCHLORIDE 1 MG: 2 INJECTION, SOLUTION INTRAMUSCULAR; INTRAVENOUS at 18:58

## 2019-08-11 RX ADMIN — ONDANSETRON HYDROCHLORIDE 1 MG: 2 INJECTION, SOLUTION INTRAMUSCULAR; INTRAVENOUS at 20:24

## 2019-08-11 RX ADMIN — KETOROLAC TROMETHAMINE 1 MG: 30 INJECTION, SOLUTION INTRAMUSCULAR at 18:58

## 2019-08-11 RX ADMIN — HYDROMORPHONE HYDROCHLORIDE 1 MG: 1 INJECTION, SOLUTION INTRAMUSCULAR; INTRAVENOUS; SUBCUTANEOUS at 18:58

## 2019-08-11 NOTE — ERD
ER Documentation


Chief Complaint


Chief Complaint





abdominal pain today, hx gallstones, on chemo





HPI


This is a 59-year-old female with a past medical history of pelvic carcinoma.  


In 2011 the patient had a total abdominal hysterectomy.  In 2018 December 29 the


patient was found to have a pelvic mass around the postsurgical site.  A biopsy 


was obtained and it was found to be cancerous.  The patient is scheduled in 3 


days to undergo her fifth round of chemotherapy.  She has a known history of 


cholelithiasis.  6 days ago she was seen and evaluated by the surgeon Dr. Purdy.  The patient has decided to not undergo an elective cholecystectomy 


until after her chemo therapy is completed.  She states that at 3 AM this 


morning 15 hours prior to arrival the patient developed the severe pain in her 


right upper quadrant that radiated to the tip of her right scapula.  She had 2 


episodes of nonbloody nonbilious emesis.  She took tramadol but this did not 


improve her pain.  She states the pain is similar nature to previous episodes of


biliary colic.  She denied any bilious emesis.  She stated the emesis that she 


experienced was nonbloody and nonbilious.  No fevers or shaking or chills.  The 


pain does not radiate to the lower abdomen.  She states the pain is 10 out of 10


in intensity with no alleviating or exacerbating factors.





ROS


All systems reviewed and are negative except as per history of present illness.





Medications


Home Meds


Active Scripts


Tramadol HCl (Tramadol HCl) 50 Mg Tablet, 50 MG PO Q4 PRN for PAIN, #20 TAB


   Prov:BOB COLEY MD         8/11/19


Famotidine* (Famotidine*) 20 Mg Tablet, 20 MG PO Q12 for 30 Days, #60 TAB


   Prov:LIZBETH HERNANDEZ M.D.         7/21/19


Docusate Sodium* (Colace*) 100 Mg Capsule, 100 MG PO DAILY for 30 Days, #30 CAP


   Prov:LIZBETH HERNANDEZ M.D.         7/21/19


Reported Medications


Tramadol HCl (Tramadol HCl) 50 Mg Tablet, 50 MG PO Q6H PRN for PAIN LEVEL 6-10, 


#120 TAB


   7/27/19


Ondansetron Hcl* (Ondansetron Hcl*) 4 Mg Tablet, 4 MG PO DAILY for 30 Days


   6/28/19


Empagliflozin (Jardiance) 10 Mg Tablet, 10 MG PO DAILY, TAB


   12/29/18


Carvedilol* (Carvedilol*) 3.125 Mg Tablet, 3.125 MG PO BID, #60 TAB


   12/29/18





Allergies


Allergies:  


Coded Allergies:  


     acetaminophen (Unverified  Allergy, Unknown, ITCHING, 7/10/19)


     hydrocodone (Unverified  Allergy, Unknown, ITCHING, 7/10/19)





PMhx/Soc


History of Surgery:  Yes (Hysterectomy)


Anesthesia Reaction:  No


Hx Neurological Disorder:  No


Hx Respiratory Disorders:  No


Hx Cardiac Disorders:  No


Hx Psychiatric Problems:  No


Hx Miscellaneous Medical Probl:  Yes (Cancer in 'pelvic region")


Hx Alcohol Use:  No


Hx Substance Use:  No


Hx Tobacco Use:  No


Smoking Status:  Never smoker





Physical Exam


Vitals





Vital Signs


  Date      Temp  Pulse  Resp  B/P (MAP)   Pulse Ox  O2          O2 Flow    FiO2


Time                                                 Delivery    Rate


   8/11/19           63    16      128/62       100  Nasal             2.0


     19:26                           (84)            Cannula


   8/11/19  97.8     87    18      146/82       100


     17:22                          (103)





Physical Exam


Constitutional:Well-developed. Well-nourished.


HEENT:Normocephalic. Atraumatic.Pupils were equal round reactive to light. Moist


mucous membranes.No tonsillar exudates.


Neck: No nuchal rigidity. No lymphadenopathy. No posterior cervical spine 


tenderness or step-offs.


Respiratory: Not using accessory muscles of respiration.Lungs were clear to 


auscultation bilaterally. No rhonchi. No rales. No wheezing. 


Cardiovascular: Regular rate regular rhythm.No murmurs. No rubs were 


appreciated.S1, S2 normal. Distal pulses are palpable 2+ bilaterally.


GI: Abdomen was soft.  Tenderness in the right upper quadrant with negative Mur


phy sign.  No tenderness of right lower quadrant over McBurney's point.  Psoas 


sign negative.  Obturator sign negative.. Non Distended. No pulsatile abdominal 


masses or bruits. No rebound. No guarding. Bowel sounds were present and normal.





Muscle skeletal: Full range of motion of both the upper and lower extremities 


bilaterally.Normal muscle tone.No assymetrical calf tenderness or swelling. 


Skin: No petechia, no purpura. No lesions on the palms or the soles of the feet.


No maculopapular rash.


NEURO: Patient was alert, awake, orientated x3.No facial droop. Gait observed 


and normal with no ataxia.Speech had regular rate and rhythm. No focal 


neurological deficits.


Result Diagram:  


8/11/19 1857 8/11/19 1857





Results 24 hrs





Laboratory Tests


              Test
                                 8/11/19
18:57


              White Blood Count                      6.1 10^3/ul


              Red Blood Count                       4.74 10^6/ul


              Hemoglobin                               13.2 g/dl


              Hematocrit                                  42.1 %


              Mean Corpuscular Volume                    88.8 fl


              Mean Corpuscular Hemoglobin                27.8 pg


              Mean Corpuscular Hemoglobin
Concent     31.4 g/dl 



              Red Cell Distribution Width                 17.7 %


              Platelet Count                         381 10^3/UL


              Mean Platelet Volume                       11.3 fl


              Immature Granulocytes %                    0.800 %


              Neutrophils %                               67.7 %


              Lymphocytes %                               15.0 %


              Monocytes %                                 14.5 %


              Eosinophils %                                0.5 %


              Basophils %                                  1.5 %


              Nucleated Red Blood Cells %            0.0 /100WBC


              Immature Granulocytes #              0.050 10^3/ul


              Neutrophils #                          4.1 10^3/ul


              Lymphocytes #                          0.9 10^3/ul


              Monocytes #                            0.9 10^3/ul


              Eosinophils #                          0.0 10^3/ul


              Basophils #                            0.1 10^3/ul


              Nucleated Red Blood Cells #            0.0 10^3/ul


              Prothrombin Time                          13.0 Sec


              Prothrombin Time Ratio                         1.0


              INR International Normalized
Ratio           0.97 



              Activated Partial
Thromboplast Time      25.6 Sec 



              Sodium Level                            140 mmol/L


              Potassium Level                         3.3 mmol/L


              Chloride Level                          102 mmol/L


              Carbon Dioxide Level                     25 mmol/L


              Anion Gap                                       13


              Blood Urea Nitrogen                       19 mg/dl


              Creatinine                              0.99 mg/dl


              Est Glomerular Filtrat Rate
mL/min      58 mL/min 



              Glucose Level                            103 mg/dl


              Calcium Level                            9.1 mg/dl


              Total Bilirubin                          0.7 mg/dl


              Direct Bilirubin                        0.00 mg/dl


              Indirect Bilirubin                       0.7 mg/dl


              Aspartate Amino Transf
(AST/SGOT)         41 IU/L 



              Alanine Aminotransferase
(ALT/SGPT)       54 IU/L 



              Alkaline Phosphatase                      138 IU/L


              Total Protein                             8.9 g/dl


              Albumin                                   4.6 g/dl


              Globulin                                 4.30 g/dl


              Albumin/Globulin Ratio                        1.06


              Amylase Level                              116 U/L


              Lipase                                      87 U/L





Current Medications


 Medications
   Dose
          Sig/Gracia
       Start Time
   Status  Last


 (Trade)       Ordered        Route
 PRN     Stop Time              Admin
Dose


                              Reason                                Admin


 Sodium         1,000 ml @ 
   Q1H STAT
      8/11/19       DC           8/11/19


Chloride       1,000 mls/hr   IV
            18:47
                       18:58



                                             8/11/19 19:46


                1 mg           ONCE  STAT
    8/11/19       DC           8/11/19


Hydromorphone                 IV
            18:47
                       18:58



HCl
                                         8/11/19 18:49


(Dilaudid)


 Ondansetron    4 mg           ONCE  STAT
    8/11/19       DC           8/11/19


HCl
  (Zofran                 IV
            18:47
                       18:58



Inj)                                         8/11/19 18:49


 Ketorolac
     30 mg          ONCE  STAT
    8/11/19       DC           8/11/19


Tromethamine
                 IV
            18:47
                       18:58



 (Toradol)                                   8/11/19 18:49


                1 mg           ONCE  STAT
    8/11/19       DC           8/11/19


Hydromorphone                 IV
            20:15
                       20:24



HCl
                                         8/11/19 20:21


(Dilaudid)


 Ondansetron    4 mg           ONCE  STAT
    8/11/19       DC           8/11/19


HCl
  (Zofran                 IV
            20:15
                       20:24



Inj)                                         8/11/19 20:20








Procedures/MDM


This patient presented to the emergency department with abdominal pain and was 


seen and evaluated by myself. My differential diagnosis included but was not 


limited to abdominal aortic aneurysm, appendicitis, pancreatitis, perforated 


peptic ulcer, perforated viscus, Boerhaaves syndrome or visceral pain such as


diverticulitis, DKA, esophagitis, hepatitis or bowel obstruction.





The patient was placed on a cardiac monitor, continuous pulse oximetry, and IV 


access was established by nursing staff.  The patient was given intravenous 


Dilaudid Zofran and Toradol.  She has a known history of cholelithiasis.  She is


had repeat radiographic imaging I did not feel this was required at this time.  


Ancillary laboratory work however was obtained and my clinical suspicion was low


for cholecystitis.  The patient had no transaminitis.





Observation Note:


Time:   4  Hours


Family Hx:   No Hypertension


Evaluation:   Multiple exams showed improving symptoms and no evidence of 


worsening of her symptoms.  She received analgesic medication and complete 


resolution of her pain.  She requested a medication refill of her tramadol.





Departure


Diagnosis:  


   Primary Impression:  


   Cholelithiasis


   Cholelithiasis location:  gallbladder  Cholecystitis presence:  without 


   cholecystitis  Biliary obstruction:  without biliary obstruction  Qualified 


   Codes:  K80.20 - Calculus of gallbladder without cholecystitis without 


   obstruction


Condition:  Fair











VIANCA,BOB MD            Aug 11, 2019 19:01

## 2019-08-23 ENCOUNTER — HOSPITAL ENCOUNTER (EMERGENCY)
Dept: HOSPITAL 91 - E/R | Age: 59
Discharge: HOME | End: 2019-08-23
Payer: COMMERCIAL

## 2019-08-23 ENCOUNTER — HOSPITAL ENCOUNTER (EMERGENCY)
Dept: HOSPITAL 10 - E/R | Age: 59
Discharge: HOME | End: 2019-08-23
Payer: COMMERCIAL

## 2019-08-23 VITALS
HEIGHT: 59 IN | BODY MASS INDEX: 22.04 KG/M2 | BODY MASS INDEX: 22.04 KG/M2 | WEIGHT: 109.35 LBS | HEIGHT: 59 IN | WEIGHT: 109.35 LBS

## 2019-08-23 VITALS — DIASTOLIC BLOOD PRESSURE: 69 MMHG | HEART RATE: 92 BPM | RESPIRATION RATE: 18 BRPM | SYSTOLIC BLOOD PRESSURE: 121 MMHG

## 2019-08-23 DIAGNOSIS — R53.1: Primary | ICD-10-CM

## 2019-08-23 DIAGNOSIS — Z85.43: ICD-10-CM

## 2019-08-23 LAB
ABNORMAL IP MESSAGE: 1
ADD MAN DIFF?: YES
ALANINE AMINOTRANSFERASE: 53 IU/L (ref 13–69)
ALBUMIN/GLOBULIN RATIO: 1.38
ALBUMIN: 4.3 G/DL (ref 3.3–4.9)
ALKALINE PHOSPHATASE: 110 IU/L (ref 42–121)
ANION GAP: 20 (ref 5–13)
ANISOCYTOSIS: (no result) (ref 0–0)
ASPARTATE AMINO TRANSFERASE: 42 IU/L (ref 15–46)
BAND NEUTROPHILS #M: 0 10^3/UL (ref 0–0.6)
BAND NEUTROPHILS % (M): 4 % (ref 0–4)
BILIRUBIN,DIRECT: 0 MG/DL (ref 0–0.2)
BILIRUBIN,TOTAL: 0.9 MG/DL (ref 0.2–1.3)
BLOOD UREA NITROGEN: 13 MG/DL (ref 7–20)
CALCIUM: 7.6 MG/DL (ref 8.4–10.2)
CARBON DIOXIDE: 20 MMOL/L (ref 21–31)
CHLORIDE: 96 MMOL/L (ref 97–110)
CREATININE: 0.85 MG/DL (ref 0.44–1)
GIANT THROMBO% (M): 2 % (ref 0–0)
GLOBULIN: 3.1 G/DL (ref 1.3–3.2)
GLUCOSE: 80 MG/DL (ref 70–220)
HEMATOCRIT: 31.1 % (ref 37–47)
HEMOGLOBIN: 10.3 G/DL (ref 12–16)
IMMATURE GRANS #M: 0.01 10^3/UL (ref 0–0.03)
IMMATURE GRANS % (M): 0.4 % (ref 0–0.43)
LYMPHOCYTES #M: 1.1 10^3/UL (ref 0.8–2.9)
LYMPHOCYTES % (M): 46 % (ref 15–51)
MEAN CORPUSCULAR HEMOGLOBIN: 29 PG (ref 29–33)
MEAN CORPUSCULAR HGB CONC: 33.1 G/DL (ref 32–37)
MEAN CORPUSCULAR VOLUME: 87.6 FL (ref 82–101)
MEAN PLATELET VOLUME: 13.1 FL (ref 7.4–10.4)
MICROCYTOSIS: (no result) (ref 0–0)
MONOCYTE #M: 0.3 10^3/UL (ref 0.3–0.9)
MONOCYTES % (M): 16 % (ref 0–11)
MYELOCYTES #M: 0 10^3/UL (ref 0–0)
MYELOCYTES % (M): 2 % (ref 0–0)
NUCLEATED RED BLOOD CELLS%: 0 /100WBC (ref 0–0)
PLATELET COUNT: 93 10^3/UL (ref 140–415)
PLATELET ESTIMATE: (no result)
POLYCHROMASIA: (no result) (ref 0–0)
POSITIVE DIFF: (no result)
POTASSIUM: 3.3 MMOL/L (ref 3.5–5.1)
REACTIVE LYMPHOCYTES #M: 0 10^3/UL (ref 0–0)
REACTIVE LYMPHOCYTES% (M): 2 % (ref 0–0)
RED BLOOD COUNT: 3.55 10^6/UL (ref 4.2–5.4)
RED CELL DISTRIBUTION WIDTH: 15.2 % (ref 11.5–14.5)
SEG NEUT #M: 0.7 10^3/UL (ref 1.6–7.5)
SEGMENTED NEUTROPHILS (M) %: 30 % (ref 39–77)
SMUDGE%M: 6 % (ref 0–0)
SODIUM: 136 MMOL/L (ref 135–144)
TOTAL PROTEIN: 7.4 G/DL (ref 6.1–8.1)
TROPONIN-I: < 0.012 NG/ML (ref 0–0.12)
WHITE BLOOD COUNT: 2.4 10^3/UL (ref 4.8–10.8)

## 2019-08-23 PROCEDURE — 93005 ELECTROCARDIOGRAM TRACING: CPT

## 2019-08-23 PROCEDURE — 84484 ASSAY OF TROPONIN QUANT: CPT

## 2019-08-23 PROCEDURE — 96360 HYDRATION IV INFUSION INIT: CPT

## 2019-08-23 PROCEDURE — 86900 BLOOD TYPING SEROLOGIC ABO: CPT

## 2019-08-23 PROCEDURE — 86920 COMPATIBILITY TEST SPIN: CPT

## 2019-08-23 PROCEDURE — 86850 RBC ANTIBODY SCREEN: CPT

## 2019-08-23 PROCEDURE — 85025 COMPLETE CBC W/AUTO DIFF WBC: CPT

## 2019-08-23 PROCEDURE — 99284 EMERGENCY DEPT VISIT MOD MDM: CPT

## 2019-08-23 PROCEDURE — 80053 COMPREHEN METABOLIC PANEL: CPT

## 2019-08-23 PROCEDURE — 86901 BLOOD TYPING SEROLOGIC RH(D): CPT

## 2019-08-23 RX ADMIN — POTASSIUM CHLORIDE 1 MEQ: 1500 TABLET, EXTENDED RELEASE ORAL at 14:43

## 2019-08-23 RX ADMIN — THIAMINE HYDROCHLORIDE 1 MLS/HR: 100 INJECTION, SOLUTION INTRAMUSCULAR; INTRAVENOUS at 14:31

## 2019-08-26 ENCOUNTER — HOSPITAL ENCOUNTER (EMERGENCY)
Dept: HOSPITAL 91 - E/R | Age: 59
Discharge: HOME | End: 2019-08-26
Payer: COMMERCIAL

## 2019-08-26 ENCOUNTER — HOSPITAL ENCOUNTER (EMERGENCY)
Dept: HOSPITAL 10 - E/R | Age: 59
Discharge: HOME | End: 2019-08-26
Payer: COMMERCIAL

## 2019-08-26 VITALS
HEIGHT: 55 IN | BODY MASS INDEX: 25.56 KG/M2 | WEIGHT: 110.45 LBS | BODY MASS INDEX: 25.56 KG/M2 | WEIGHT: 110.45 LBS | HEIGHT: 55 IN

## 2019-08-26 VITALS — RESPIRATION RATE: 12 BRPM | HEART RATE: 80 BPM | SYSTOLIC BLOOD PRESSURE: 118 MMHG | DIASTOLIC BLOOD PRESSURE: 62 MMHG

## 2019-08-26 DIAGNOSIS — R07.9: ICD-10-CM

## 2019-08-26 DIAGNOSIS — D61.818: ICD-10-CM

## 2019-08-26 DIAGNOSIS — E87.6: ICD-10-CM

## 2019-08-26 DIAGNOSIS — M79.18: Primary | ICD-10-CM

## 2019-08-26 DIAGNOSIS — R40.2362: ICD-10-CM

## 2019-08-26 DIAGNOSIS — R40.2142: ICD-10-CM

## 2019-08-26 DIAGNOSIS — C56.9: ICD-10-CM

## 2019-08-26 DIAGNOSIS — R40.2252: ICD-10-CM

## 2019-08-26 LAB
ABNORMAL IP MESSAGE: 1
ADD MAN DIFF?: YES
ALANINE AMINOTRANSFERASE: 42 IU/L (ref 13–69)
ALBUMIN/GLOBULIN RATIO: 1.16
ALBUMIN: 4.2 G/DL (ref 3.3–4.9)
ALKALINE PHOSPHATASE: 87 IU/L (ref 42–121)
ANION GAP: 16 (ref 5–13)
ANISOCYTOSIS: (no result) (ref 0–0)
ASPARTATE AMINO TRANSFERASE: 41 IU/L (ref 15–46)
BAND NEUTROPHILS #M: 0 10^3/UL (ref 0–0.6)
BAND NEUTROPHILS % (M): 1 % (ref 0–4)
BILIRUBIN,DIRECT: 0 MG/DL (ref 0–0.2)
BILIRUBIN,TOTAL: 0.4 MG/DL (ref 0.2–1.3)
BLOOD UREA NITROGEN: 12 MG/DL (ref 7–20)
CALCIUM: 6.6 MG/DL (ref 8.4–10.2)
CARBON DIOXIDE: 21 MMOL/L (ref 21–31)
CHLORIDE: 103 MMOL/L (ref 97–110)
CREATININE: 0.76 MG/DL (ref 0.44–1)
ERYTHROBLAST% (NRBC) (M): 2 % (ref 0–0)
GIANT THROMBO% (M): 6 % (ref 0–0)
GLOBULIN: 3.6 G/DL (ref 1.3–3.2)
GLUCOSE: 90 MG/DL (ref 70–220)
HEMATOCRIT: 30.5 % (ref 37–47)
HEMOGLOBIN: 9.7 G/DL (ref 12–16)
IMMATURE GRANS #M: 0.11 10^3/UL (ref 0–0.03)
IMMATURE GRANS % (M): 3.4 % (ref 0–0.43)
INR: 0.98
LYMPHOCYTES #M: 0.7 10^3/UL (ref 0.8–2.9)
LYMPHOCYTES % (M): 24 % (ref 15–51)
MEAN CORPUSCULAR HEMOGLOBIN: 28.7 PG (ref 29–33)
MEAN CORPUSCULAR HGB CONC: 31.8 G/DL (ref 32–37)
MEAN CORPUSCULAR VOLUME: 90.2 FL (ref 82–101)
MEAN PLATELET VOLUME: 12.3 FL (ref 7.4–10.4)
MICROCYTOSIS: (no result) (ref 0–0)
MONOCYTE #M: 0.3 10^3/UL (ref 0.3–0.9)
MONOCYTES % (M): 11 % (ref 0–11)
NUCLEATED RED BLOOD CELLS%: 0 /100WBC (ref 0–0)
OVALOCYTES: (no result) (ref 0–0)
PARTIAL THROMBOPLASTIN TIME: 24.5 SEC (ref 23–35)
PLATELET COUNT: 51 10^3/UL (ref 140–415)
PLATELET ESTIMATE: (no result)
POIKILOCYTOSIS: (no result) (ref 0–0)
POSITIVE DIFF: (no result)
POTASSIUM: 3.1 MMOL/L (ref 3.5–5.1)
PROTIME: 13.1 SEC (ref 11.9–14.9)
PT RATIO: 1
REACTIVE LYMPHOCYTES #M: 0 10^3/UL (ref 0–0)
REACTIVE LYMPHOCYTES% (M): 1 % (ref 0–0)
RED BLOOD COUNT: 3.38 10^6/UL (ref 4.2–5.4)
RED CELL DISTRIBUTION WIDTH: 15.9 % (ref 11.5–14.5)
SEG NEUT #M: 2.1 10^3/UL (ref 1.6–7.5)
SEGMENTED NEUTROPHILS (M) %: 63 % (ref 39–77)
SMUDGE%M: 47 % (ref 0–0)
SODIUM: 140 MMOL/L (ref 135–144)
TOTAL PROTEIN: 7.8 G/DL (ref 6.1–8.1)
TROPONIN-I: < 0.012 NG/ML (ref 0–0.12)
WHITE BLOOD COUNT: 3.3 10^3/UL (ref 4.8–10.8)

## 2019-08-26 PROCEDURE — 96375 TX/PRO/DX INJ NEW DRUG ADDON: CPT

## 2019-08-26 PROCEDURE — 85610 PROTHROMBIN TIME: CPT

## 2019-08-26 PROCEDURE — 80053 COMPREHEN METABOLIC PANEL: CPT

## 2019-08-26 PROCEDURE — 85025 COMPLETE CBC W/AUTO DIFF WBC: CPT

## 2019-08-26 PROCEDURE — 85730 THROMBOPLASTIN TIME PARTIAL: CPT

## 2019-08-26 PROCEDURE — 96374 THER/PROPH/DIAG INJ IV PUSH: CPT

## 2019-08-26 PROCEDURE — 36415 COLL VENOUS BLD VENIPUNCTURE: CPT

## 2019-08-26 PROCEDURE — 84484 ASSAY OF TROPONIN QUANT: CPT

## 2019-08-26 PROCEDURE — 99285 EMERGENCY DEPT VISIT HI MDM: CPT

## 2019-08-26 PROCEDURE — 70450 CT HEAD/BRAIN W/O DYE: CPT

## 2019-08-26 PROCEDURE — 93005 ELECTROCARDIOGRAM TRACING: CPT

## 2019-08-26 PROCEDURE — 71045 X-RAY EXAM CHEST 1 VIEW: CPT

## 2019-08-26 RX ADMIN — THIAMINE HYDROCHLORIDE 1 MLS/HR: 100 INJECTION, SOLUTION INTRAMUSCULAR; INTRAVENOUS at 18:22

## 2019-08-26 RX ADMIN — ONDANSETRON HYDROCHLORIDE 1 MG: 2 INJECTION, SOLUTION INTRAMUSCULAR; INTRAVENOUS at 18:22

## 2019-08-26 RX ADMIN — POTASSIUM CHLORIDE 1 MEQ: 1500 TABLET, EXTENDED RELEASE ORAL at 19:32

## 2019-08-29 ENCOUNTER — HOSPITAL ENCOUNTER (INPATIENT)
Dept: HOSPITAL 91 - E/R | Age: 59
LOS: 1 days | Discharge: HOME | DRG: 641 | End: 2019-08-30
Payer: COMMERCIAL

## 2019-08-29 ENCOUNTER — HOSPITAL ENCOUNTER (INPATIENT)
Dept: HOSPITAL 10 - E/R | Age: 59
LOS: 1 days | Discharge: HOME | DRG: 641 | End: 2019-08-30
Attending: INTERNAL MEDICINE | Admitting: INTERNAL MEDICINE
Payer: COMMERCIAL

## 2019-08-29 VITALS — SYSTOLIC BLOOD PRESSURE: 122 MMHG | HEART RATE: 61 BPM | DIASTOLIC BLOOD PRESSURE: 67 MMHG | RESPIRATION RATE: 18 BRPM

## 2019-08-29 VITALS — RESPIRATION RATE: 21 BRPM | HEART RATE: 78 BPM | DIASTOLIC BLOOD PRESSURE: 65 MMHG | SYSTOLIC BLOOD PRESSURE: 128 MMHG

## 2019-08-29 VITALS
WEIGHT: 109.13 LBS | HEIGHT: 59 IN | WEIGHT: 109.13 LBS | BODY MASS INDEX: 22 KG/M2 | BODY MASS INDEX: 22 KG/M2 | HEIGHT: 59 IN

## 2019-08-29 VITALS — SYSTOLIC BLOOD PRESSURE: 130 MMHG | HEART RATE: 72 BPM | DIASTOLIC BLOOD PRESSURE: 61 MMHG | RESPIRATION RATE: 21 BRPM

## 2019-08-29 DIAGNOSIS — I10: ICD-10-CM

## 2019-08-29 DIAGNOSIS — E83.42: ICD-10-CM

## 2019-08-29 DIAGNOSIS — C56.9: ICD-10-CM

## 2019-08-29 DIAGNOSIS — K21.9: ICD-10-CM

## 2019-08-29 DIAGNOSIS — E11.9: ICD-10-CM

## 2019-08-29 DIAGNOSIS — E83.51: Primary | ICD-10-CM

## 2019-08-29 LAB
ABNORMAL IP MESSAGE: 1
ADD MAN DIFF?: NO
ALANINE AMINOTRANSFERASE: 33 IU/L (ref 13–69)
ALBUMIN/GLOBULIN RATIO: 1.24
ALBUMIN: 3.6 G/DL (ref 3.3–4.9)
ALKALINE PHOSPHATASE: 68 IU/L (ref 42–121)
ANION GAP: 15 (ref 5–13)
ASPARTATE AMINO TRANSFERASE: 38 IU/L (ref 15–46)
BASOPHIL #: 0 10^3/UL (ref 0–0.1)
BASOPHILS %: 0.4 % (ref 0–2)
BILIRUBIN,DIRECT: 0 MG/DL (ref 0–0.2)
BILIRUBIN,TOTAL: 0.6 MG/DL (ref 0.2–1.3)
BLOOD UREA NITROGEN: 9 MG/DL (ref 7–20)
CALCIUM: 5.8 MG/DL (ref 8.4–10.2)
CARBON DIOXIDE: 22 MMOL/L (ref 21–31)
CHLORIDE: 104 MMOL/L (ref 97–110)
CREATINE KINASE: 260 IU/L (ref 23–200)
CREATININE: 0.76 MG/DL (ref 0.44–1)
EOSINOPHILS #: 0 10^3/UL (ref 0–0.5)
EOSINOPHILS %: 0 % (ref 0–7)
GLOBULIN: 2.9 G/DL (ref 1.3–3.2)
GLUCOSE: 80 MG/DL (ref 70–220)
HEMATOCRIT: 28 % (ref 37–47)
HEMOGLOBIN: 9 G/DL (ref 12–16)
IMMATURE GRANS #M: 0.02 10^3/UL (ref 0–0.03)
IMMATURE GRANS % (M): 0.4 % (ref 0–0.43)
LIPASE: 74 U/L (ref 23–300)
LYMPHOCYTES #: 1 10^3/UL (ref 0.8–2.9)
LYMPHOCYTES %: 19 % (ref 15–51)
MAGNESIUM: 0.5 MG/DL (ref 1.7–2.5)
MEAN CORPUSCULAR HEMOGLOBIN: 28.8 PG (ref 29–33)
MEAN CORPUSCULAR HGB CONC: 32.1 G/DL (ref 32–37)
MEAN CORPUSCULAR VOLUME: 89.5 FL (ref 82–101)
MEAN PLATELET VOLUME: 12.6 FL (ref 7.4–10.4)
MONOCYTE #: 0.5 10^3/UL (ref 0.3–0.9)
MONOCYTES %: 9.3 % (ref 0–11)
NEUTROPHIL #: 3.8 10^3/UL (ref 1.6–7.5)
NEUTROPHILS %: 70.9 % (ref 39–77)
NUCLEATED RED BLOOD CELLS #: 0 10^3/UL (ref 0–0)
NUCLEATED RED BLOOD CELLS%: 0 /100WBC (ref 0–0)
PLATELET COUNT: 75 10^3/UL (ref 140–415)
POSITIVE DIFF: (no result)
POTASSIUM: 3.5 MMOL/L (ref 3.5–5.1)
RED BLOOD COUNT: 3.13 10^6/UL (ref 4.2–5.4)
RED CELL DISTRIBUTION WIDTH: 15.8 % (ref 11.5–14.5)
SODIUM: 141 MMOL/L (ref 135–144)
THYROID STIMULATING HORMONE: 1.9 MIU/L (ref 0.47–4.68)
TOTAL PROTEIN: 6.5 G/DL (ref 6.1–8.1)
WHITE BLOOD COUNT: 5.4 10^3/UL (ref 4.8–10.8)

## 2019-08-29 PROCEDURE — 80053 COMPREHEN METABOLIC PANEL: CPT

## 2019-08-29 PROCEDURE — 96374 THER/PROPH/DIAG INJ IV PUSH: CPT

## 2019-08-29 PROCEDURE — 96375 TX/PRO/DX INJ NEW DRUG ADDON: CPT

## 2019-08-29 PROCEDURE — 36415 COLL VENOUS BLD VENIPUNCTURE: CPT

## 2019-08-29 PROCEDURE — 85025 COMPLETE CBC W/AUTO DIFF WBC: CPT

## 2019-08-29 PROCEDURE — 93005 ELECTROCARDIOGRAM TRACING: CPT

## 2019-08-29 PROCEDURE — 83735 ASSAY OF MAGNESIUM: CPT

## 2019-08-29 PROCEDURE — 83690 ASSAY OF LIPASE: CPT

## 2019-08-29 PROCEDURE — 82550 ASSAY OF CK (CPK): CPT

## 2019-08-29 PROCEDURE — 80048 BASIC METABOLIC PNL TOTAL CA: CPT

## 2019-08-29 PROCEDURE — 84443 ASSAY THYROID STIM HORMONE: CPT

## 2019-08-29 PROCEDURE — 99285 EMERGENCY DEPT VISIT HI MDM: CPT

## 2019-08-29 RX ADMIN — POTASSIUM CHLORIDE 1 MEQ: 1500 TABLET, EXTENDED RELEASE ORAL at 16:41

## 2019-08-29 RX ADMIN — LORAZEPAM 1 MG: 2 INJECTION, SOLUTION INTRAMUSCULAR; INTRAVENOUS at 12:41

## 2019-08-29 RX ADMIN — MAGNESIUM SULFATE HEPTAHYDRATE 1 MLS/HR: 40 INJECTION, SOLUTION INTRAVENOUS at 18:22

## 2019-08-29 RX ADMIN — THIAMINE HYDROCHLORIDE 1 MLS/HR: 100 INJECTION, SOLUTION INTRAMUSCULAR; INTRAVENOUS at 12:41

## 2019-08-29 RX ADMIN — THIAMINE HYDROCHLORIDE 1 MLS/HR: 100 INJECTION, SOLUTION INTRAMUSCULAR; INTRAVENOUS at 13:20

## 2019-08-29 RX ADMIN — MAGNESIUM SULFATE HEPTAHYDRATE 1 MLS/HR: 40 INJECTION, SOLUTION INTRAVENOUS at 14:39

## 2019-08-29 RX ADMIN — CALCIUM GLUCONATE 1 MLS/HR: 94 INJECTION, SOLUTION INTRAVENOUS at 14:39

## 2019-08-29 RX ADMIN — CALCIUM GLUCONATE 1 MLS/HR: 94 INJECTION, SOLUTION INTRAVENOUS at 18:22

## 2019-08-29 RX ADMIN — CALCIUM GLUCONATE 1 MLS/HR: 94 INJECTION, SOLUTION INTRAVENOUS at 15:56

## 2019-08-30 VITALS — SYSTOLIC BLOOD PRESSURE: 115 MMHG | HEART RATE: 88 BPM | DIASTOLIC BLOOD PRESSURE: 58 MMHG

## 2019-08-30 VITALS — RESPIRATION RATE: 20 BRPM | SYSTOLIC BLOOD PRESSURE: 122 MMHG | HEART RATE: 71 BPM | DIASTOLIC BLOOD PRESSURE: 69 MMHG

## 2019-08-30 VITALS — RESPIRATION RATE: 22 BRPM | SYSTOLIC BLOOD PRESSURE: 106 MMHG | DIASTOLIC BLOOD PRESSURE: 59 MMHG | HEART RATE: 76 BPM

## 2019-08-30 LAB
ANION GAP: 7 (ref 5–13)
BLOOD UREA NITROGEN: 4 MG/DL (ref 7–20)
CALCIUM: 7.2 MG/DL (ref 8.4–10.2)
CARBON DIOXIDE: 26 MMOL/L (ref 21–31)
CHLORIDE: 104 MMOL/L (ref 97–110)
CREATININE: 0.69 MG/DL (ref 0.44–1)
GLUCOSE: 76 MG/DL (ref 70–220)
MAGNESIUM: 2.5 MG/DL (ref 1.7–2.5)
POTASSIUM: 3.5 MMOL/L (ref 3.5–5.1)
SODIUM: 137 MMOL/L (ref 135–144)

## 2019-08-30 RX ADMIN — CALCIUM GLUCONATE 1 MLS/HR: 94 INJECTION, SOLUTION INTRAVENOUS at 12:09

## 2019-09-07 ENCOUNTER — HOSPITAL ENCOUNTER (OUTPATIENT)
Dept: HOSPITAL 91 - 2NE | Age: 59
Setting detail: OBSERVATION
LOS: 1 days | Discharge: HOME | End: 2019-09-08
Payer: COMMERCIAL

## 2019-09-07 ENCOUNTER — HOSPITAL ENCOUNTER (OUTPATIENT)
Dept: HOSPITAL 10 - E/R | Age: 59
Setting detail: OBSERVATION
LOS: 1 days | Discharge: HOME | End: 2019-09-08
Attending: INTERNAL MEDICINE | Admitting: INTERNAL MEDICINE
Payer: COMMERCIAL

## 2019-09-07 VITALS — DIASTOLIC BLOOD PRESSURE: 58 MMHG | RESPIRATION RATE: 18 BRPM | SYSTOLIC BLOOD PRESSURE: 114 MMHG | HEART RATE: 90 BPM

## 2019-09-07 VITALS
WEIGHT: 108.03 LBS | BODY MASS INDEX: 21.78 KG/M2 | HEIGHT: 59 IN | BODY MASS INDEX: 21.78 KG/M2 | HEIGHT: 59 IN | WEIGHT: 108.03 LBS

## 2019-09-07 VITALS — RESPIRATION RATE: 18 BRPM | HEART RATE: 92 BPM | SYSTOLIC BLOOD PRESSURE: 112 MMHG | DIASTOLIC BLOOD PRESSURE: 62 MMHG

## 2019-09-07 DIAGNOSIS — E87.6: ICD-10-CM

## 2019-09-07 DIAGNOSIS — E11.9: ICD-10-CM

## 2019-09-07 DIAGNOSIS — I10: ICD-10-CM

## 2019-09-07 DIAGNOSIS — E83.41: ICD-10-CM

## 2019-09-07 DIAGNOSIS — D63.8: ICD-10-CM

## 2019-09-07 DIAGNOSIS — C56.9: ICD-10-CM

## 2019-09-07 DIAGNOSIS — D72.819: ICD-10-CM

## 2019-09-07 DIAGNOSIS — E83.51: Primary | ICD-10-CM

## 2019-09-07 LAB
ABNORMAL IP MESSAGE: 1
ADD MAN DIFF?: YES
ALANINE AMINOTRANSFERASE: 77 IU/L (ref 13–69)
ALBUMIN/GLOBULIN RATIO: 1.17
ALBUMIN: 4.1 G/DL (ref 3.3–4.9)
ALKALINE PHOSPHATASE: 79 IU/L (ref 42–121)
ANION GAP: 14 (ref 5–13)
ANION GAP: 7 (ref 5–13)
ANISOCYTOSIS: (no result) (ref 0–0)
ASPARTATE AMINO TRANSFERASE: 85 IU/L (ref 15–46)
BAND NEUTROPHILS #M: 0 10^3/UL (ref 0–0.6)
BAND NEUTROPHILS % (M): 2 % (ref 0–4)
BILIRUBIN,DIRECT: 0 MG/DL (ref 0–0.2)
BILIRUBIN,TOTAL: 1.3 MG/DL (ref 0.2–1.3)
BLOOD UREA NITROGEN: 15 MG/DL (ref 7–20)
BLOOD UREA NITROGEN: 19 MG/DL (ref 7–20)
CALCIUM: 6.3 MG/DL (ref 8.4–10.2)
CALCIUM: 6.9 MG/DL (ref 8.4–10.2)
CARBON DIOXIDE: 25 MMOL/L (ref 21–31)
CARBON DIOXIDE: 28 MMOL/L (ref 21–31)
CHLORIDE: 100 MMOL/L (ref 97–110)
CHLORIDE: 100 MMOL/L (ref 97–110)
CREATININE: 0.72 MG/DL (ref 0.44–1)
CREATININE: 0.88 MG/DL (ref 0.44–1)
EOSINOPHILS % (M): 1 % (ref 0–7)
GLOBULIN: 3.5 G/DL (ref 1.3–3.2)
GLUCOSE: 96 MG/DL (ref 70–220)
GLUCOSE: 98 MG/DL (ref 70–220)
HEMATOCRIT: 29.7 % (ref 37–47)
HEMOGLOBIN: 9.6 G/DL (ref 12–16)
IMMATURE GRANS #M: 0 10^3/UL (ref 0–0.03)
IMMATURE GRANS % (M): 0 % (ref 0–0.43)
LYMPHOCYTES #M: 0.4 10^3/UL (ref 0.8–2.9)
LYMPHOCYTES % (M): 27 % (ref 15–51)
MAGNESIUM: 0.7 MG/DL (ref 1.7–2.5)
MAGNESIUM: 2.3 MG/DL (ref 1.7–2.5)
MEAN CORPUSCULAR HEMOGLOBIN: 29.5 PG (ref 29–33)
MEAN CORPUSCULAR HGB CONC: 32.3 G/DL (ref 32–37)
MEAN CORPUSCULAR VOLUME: 91.4 FL (ref 82–101)
MEAN PLATELET VOLUME: 11.3 FL (ref 7.4–10.4)
MONOCYTE #M: 0 10^3/UL (ref 0.3–0.9)
MONOCYTES % (M): 2 % (ref 0–11)
NUCLEATED RED BLOOD CELLS%: 0 /100WBC (ref 0–0)
OVALOCYTES: (no result) (ref 0–0)
PLATELET COUNT: 200 10^3/UL (ref 140–415)
PLATELET ESTIMATE: NORMAL
POIKILOCYTOSIS: (no result) (ref 0–0)
POSITIVE DIFF: (no result)
POTASSIUM: 3.1 MMOL/L (ref 3.5–5.1)
POTASSIUM: 3.3 MMOL/L (ref 3.5–5.1)
RBC MORPHOLOGY COMMENT: (no result)
REACTIVE LYMPHOCYTES #M: 0 10^3/UL (ref 0–0)
REACTIVE LYMPHOCYTES% (M): 2 % (ref 0–0)
RED BLOOD COUNT: 3.25 10^6/UL (ref 4.2–5.4)
RED CELL DISTRIBUTION WIDTH: 17 % (ref 11.5–14.5)
SEG NEUT #M: 1.1 10^3/UL (ref 1.6–7.5)
SEGMENTED NEUTROPHILS (M) %: 66 % (ref 39–77)
SMUDGE%M: 2 % (ref 0–0)
SODIUM: 135 MMOL/L (ref 135–144)
SODIUM: 139 MMOL/L (ref 135–144)
TOTAL PROTEIN: 7.6 G/DL (ref 6.1–8.1)
WBC MORPHOLOGY COMMENT: (no result)
WHITE BLOOD COUNT: 1.6 10^3/UL (ref 4.8–10.8)

## 2019-09-07 PROCEDURE — 83735 ASSAY OF MAGNESIUM: CPT

## 2019-09-07 PROCEDURE — 36415 COLL VENOUS BLD VENIPUNCTURE: CPT

## 2019-09-07 PROCEDURE — 80048 BASIC METABOLIC PNL TOTAL CA: CPT

## 2019-09-07 PROCEDURE — 93005 ELECTROCARDIOGRAM TRACING: CPT

## 2019-09-07 PROCEDURE — 96375 TX/PRO/DX INJ NEW DRUG ADDON: CPT

## 2019-09-07 PROCEDURE — 96368 THER/DIAG CONCURRENT INF: CPT

## 2019-09-07 PROCEDURE — 82962 GLUCOSE BLOOD TEST: CPT

## 2019-09-07 PROCEDURE — 99285 EMERGENCY DEPT VISIT HI MDM: CPT

## 2019-09-07 PROCEDURE — 96365 THER/PROPH/DIAG IV INF INIT: CPT

## 2019-09-07 PROCEDURE — 80053 COMPREHEN METABOLIC PANEL: CPT

## 2019-09-07 PROCEDURE — 96361 HYDRATE IV INFUSION ADD-ON: CPT

## 2019-09-07 PROCEDURE — G0378 HOSPITAL OBSERVATION PER HR: HCPCS

## 2019-09-07 PROCEDURE — 85025 COMPLETE CBC W/AUTO DIFF WBC: CPT

## 2019-09-07 RX ADMIN — MAGNESIUM SULFATE HEPTAHYDRATE 1 MLS/HR: 40 INJECTION, SOLUTION INTRAVENOUS at 13:15

## 2019-09-07 RX ADMIN — EMPAGLIFLOZIN 1 MG: 10 TABLET, FILM COATED ORAL at 11:14

## 2019-09-07 RX ADMIN — CALCIUM GLUCONATE 1 MLS/HR: 94 INJECTION, SOLUTION INTRAVENOUS at 11:15

## 2019-09-07 RX ADMIN — ONDANSETRON HYDROCHLORIDE 1 MG: 2 INJECTION, SOLUTION INTRAMUSCULAR; INTRAVENOUS at 22:45

## 2019-09-07 RX ADMIN — ONDANSETRON HYDROCHLORIDE 1 MG: 2 INJECTION, SOLUTION INTRAMUSCULAR; INTRAVENOUS at 22:49

## 2019-09-07 RX ADMIN — ONDANSETRON HYDROCHLORIDE 1 MG: 2 INJECTION, SOLUTION INTRAMUSCULAR; INTRAVENOUS at 14:42

## 2019-09-07 RX ADMIN — DEXAMETHASONE SODIUM PHOSPHATE PRN MG: 10 INJECTION, SOLUTION INTRAMUSCULAR; INTRAVENOUS at 22:49

## 2019-09-07 RX ADMIN — PANTOPRAZOLE SODIUM 1 MG: 40 TABLET, DELAYED RELEASE ORAL at 11:19

## 2019-09-07 RX ADMIN — EMPAGLIFLOZIN SCH MG: 10 TABLET, FILM COATED ORAL at 11:14

## 2019-09-07 RX ADMIN — PROCHLORPERAZINE MALEATE 1 MG: 10 TABLET, FILM COATED ORAL at 21:33

## 2019-09-07 RX ADMIN — DEXAMETHASONE SODIUM PHOSPHATE PRN MG: 10 INJECTION, SOLUTION INTRAMUSCULAR; INTRAVENOUS at 14:42

## 2019-09-07 RX ADMIN — CALCIUM GLUCONATE 1 MLS/HR: 94 INJECTION, SOLUTION INTRAVENOUS at 18:27

## 2019-09-07 RX ADMIN — LORAZEPAM 1 MG: 2 INJECTION, SOLUTION INTRAMUSCULAR; INTRAVENOUS at 10:38

## 2019-09-07 RX ADMIN — MAGNESIUM SULFATE HEPTAHYDRATE 1 MLS/HR: 40 INJECTION, SOLUTION INTRAVENOUS at 11:15

## 2019-09-07 RX ADMIN — MAGNESIUM SULFATE HEPTAHYDRATE 1 MLS/HR: 40 INJECTION, SOLUTION INTRAVENOUS at 21:35

## 2019-09-07 RX ADMIN — POTASSIUM CHLORIDE 1 MEQ: 1.5 POWDER, FOR SOLUTION ORAL at 17:48

## 2019-09-07 RX ADMIN — POTASSIUM CHLORIDE 1 MEQ: 1.5 POWDER, FOR SOLUTION ORAL at 23:31

## 2019-09-07 RX ADMIN — MORPHINE SULFATE 1 MG: 2 INJECTION, SOLUTION INTRAMUSCULAR; INTRAVENOUS at 21:43

## 2019-09-07 RX ADMIN — FILGRASTIM-AAFI 1 MCG: 480 INJECTION, SOLUTION SUBCUTANEOUS at 17:02

## 2019-09-07 RX ADMIN — PANTOPRAZOLE SODIUM SCH MG: 40 TABLET, DELAYED RELEASE ORAL at 11:19

## 2019-09-07 RX ADMIN — THIAMINE HYDROCHLORIDE 1 MLS/HR: 100 INJECTION, SOLUTION INTRAMUSCULAR; INTRAVENOUS at 10:38

## 2019-09-07 RX ADMIN — FILGRASTIM-AAFI SCH MCG: 480 INJECTION, SOLUTION SUBCUTANEOUS at 17:02

## 2019-09-08 VITALS — SYSTOLIC BLOOD PRESSURE: 94 MMHG | RESPIRATION RATE: 18 BRPM | HEART RATE: 84 BPM | DIASTOLIC BLOOD PRESSURE: 45 MMHG

## 2019-09-08 VITALS — SYSTOLIC BLOOD PRESSURE: 92 MMHG | HEART RATE: 88 BPM | RESPIRATION RATE: 18 BRPM | DIASTOLIC BLOOD PRESSURE: 51 MMHG

## 2019-09-08 VITALS — HEART RATE: 89 BPM | RESPIRATION RATE: 18 BRPM | DIASTOLIC BLOOD PRESSURE: 58 MMHG | SYSTOLIC BLOOD PRESSURE: 109 MMHG

## 2019-09-08 LAB
ABNORMAL IP MESSAGE: 1
ADD MAN DIFF?: NO
ANION GAP: 8 (ref 5–13)
BASOPHIL #: 0 10^3/UL (ref 0–0.1)
BASOPHILS %: 0.7 % (ref 0–2)
BLOOD UREA NITROGEN: 15 MG/DL (ref 7–20)
CALCIUM: 7.6 MG/DL (ref 8.4–10.2)
CARBON DIOXIDE: 28 MMOL/L (ref 21–31)
CHLORIDE: 100 MMOL/L (ref 97–110)
CREATININE: 0.74 MG/DL (ref 0.44–1)
EOSINOPHILS #: 0 10^3/UL (ref 0–0.5)
EOSINOPHILS %: 0.7 % (ref 0–7)
GLUCOSE: 95 MG/DL (ref 70–220)
HEMATOCRIT: 26.2 % (ref 37–47)
HEMOGLOBIN: 8.4 G/DL (ref 12–16)
IMMATURE GRANS #M: 0.02 10^3/UL (ref 0–0.03)
IMMATURE GRANS % (M): 1.3 % (ref 0–0.43)
LYMPHOCYTES #: 0.3 10^3/UL (ref 0.8–2.9)
LYMPHOCYTES %: 22.5 % (ref 15–51)
MEAN CORPUSCULAR HEMOGLOBIN: 29.8 PG (ref 29–33)
MEAN CORPUSCULAR HGB CONC: 32.1 G/DL (ref 32–37)
MEAN CORPUSCULAR VOLUME: 92.9 FL (ref 82–101)
MEAN PLATELET VOLUME: 11.2 FL (ref 7.4–10.4)
MONOCYTE #: 0.1 10^3/UL (ref 0.3–0.9)
MONOCYTES %: 4.6 % (ref 0–11)
NEUTROPHIL #: 1.1 10^3/UL (ref 1.6–7.5)
NEUTROPHILS %: 70.2 % (ref 39–77)
NUCLEATED RED BLOOD CELLS #: 0 10^3/UL (ref 0–0)
NUCLEATED RED BLOOD CELLS%: 0 /100WBC (ref 0–0)
PLATELET COUNT: 151 10^3/UL (ref 140–415)
POSITIVE DIFF: (no result)
POTASSIUM: 3.9 MMOL/L (ref 3.5–5.1)
RED BLOOD COUNT: 2.82 10^6/UL (ref 4.2–5.4)
RED CELL DISTRIBUTION WIDTH: 16.8 % (ref 11.5–14.5)
SODIUM: 136 MMOL/L (ref 135–144)
WHITE BLOOD COUNT: 1.5 10^3/UL (ref 4.8–10.8)

## 2019-09-08 RX ADMIN — EMPAGLIFLOZIN 1 MG: 10 TABLET, FILM COATED ORAL at 09:11

## 2019-09-08 RX ADMIN — DEXAMETHASONE SODIUM PHOSPHATE PRN MG: 10 INJECTION, SOLUTION INTRAMUSCULAR; INTRAVENOUS at 15:41

## 2019-09-08 RX ADMIN — PANTOPRAZOLE SODIUM SCH MG: 40 TABLET, DELAYED RELEASE ORAL at 09:06

## 2019-09-08 RX ADMIN — ONDANSETRON HYDROCHLORIDE 1 MG: 2 INJECTION, SOLUTION INTRAMUSCULAR; INTRAVENOUS at 15:41

## 2019-09-08 RX ADMIN — CALCIUM GLUCONATE 1 MLS/HR: 94 INJECTION, SOLUTION INTRAVENOUS at 12:26

## 2019-09-08 RX ADMIN — EMPAGLIFLOZIN SCH MG: 10 TABLET, FILM COATED ORAL at 09:11

## 2019-09-08 RX ADMIN — FILGRASTIM-AAFI 1 MCG: 480 INJECTION, SOLUTION SUBCUTANEOUS at 16:53

## 2019-09-08 RX ADMIN — FILGRASTIM-AAFI SCH MCG: 480 INJECTION, SOLUTION SUBCUTANEOUS at 16:53

## 2019-09-08 RX ADMIN — PANTOPRAZOLE SODIUM 1 MG: 40 TABLET, DELAYED RELEASE ORAL at 09:06
